# Patient Record
Sex: FEMALE | Race: WHITE | NOT HISPANIC OR LATINO | Employment: FULL TIME | URBAN - METROPOLITAN AREA
[De-identification: names, ages, dates, MRNs, and addresses within clinical notes are randomized per-mention and may not be internally consistent; named-entity substitution may affect disease eponyms.]

---

## 2017-01-30 RX ORDER — MELOXICAM 15 MG/1
TABLET ORAL
Qty: 30 TABLET | Refills: 0 | Status: SHIPPED | OUTPATIENT
Start: 2017-01-30 | End: 2017-03-28 | Stop reason: SDUPTHER

## 2017-03-28 RX ORDER — MELOXICAM 15 MG/1
TABLET ORAL
Qty: 30 TABLET | Refills: 0 | Status: SHIPPED | OUTPATIENT
Start: 2017-03-28 | End: 2017-04-29 | Stop reason: SDUPTHER

## 2017-05-01 RX ORDER — VALACYCLOVIR HYDROCHLORIDE 500 MG/1
TABLET, FILM COATED ORAL
Qty: 30 TABLET | Refills: 0 | Status: SHIPPED | OUTPATIENT
Start: 2017-05-01 | End: 2020-11-30 | Stop reason: SDUPTHER

## 2017-05-01 RX ORDER — MELOXICAM 15 MG/1
TABLET ORAL
Qty: 30 TABLET | Refills: 0 | Status: SHIPPED | OUTPATIENT
Start: 2017-05-01 | End: 2017-11-22 | Stop reason: SDUPTHER

## 2017-06-12 ENCOUNTER — OFFICE VISIT (OUTPATIENT)
Dept: INTERNAL MEDICINE | Facility: CLINIC | Age: 62
End: 2017-06-12

## 2017-06-12 VITALS
HEIGHT: 63 IN | DIASTOLIC BLOOD PRESSURE: 76 MMHG | HEART RATE: 74 BPM | BODY MASS INDEX: 24.45 KG/M2 | OXYGEN SATURATION: 98 % | WEIGHT: 138 LBS | SYSTOLIC BLOOD PRESSURE: 110 MMHG

## 2017-06-12 DIAGNOSIS — F41.9 ANXIETY: Primary | ICD-10-CM

## 2017-06-12 PROCEDURE — 99213 OFFICE O/P EST LOW 20 MIN: CPT | Performed by: INTERNAL MEDICINE

## 2017-06-12 RX ORDER — LORAZEPAM 0.5 MG/1
0.5 TABLET ORAL EVERY 8 HOURS PRN
Qty: 20 TABLET | Refills: 0 | Status: SHIPPED | OUTPATIENT
Start: 2017-06-12 | End: 2017-09-26 | Stop reason: SDUPTHER

## 2017-06-12 NOTE — PROGRESS NOTES
"Subjective     Becca Alba is a 61 y.o. female who presents with   Chief Complaint   Patient presents with   • Anxiety       History of Present Illness     She feels very anxious for the last one week.  Daughter with type 1 DM and several other health issues just had CVA.  Getting ready to go on a trip to [place].  Feels she needs something to get through trip for prn use only.        Review of Systems   Respiratory: Negative.    Cardiovascular: Negative.        The following portions of the patient's history were reviewed and updated as appropriate: allergies, current medications and problem list.    Patient Active Problem List    Diagnosis Date Noted   • Hyperlipidemia 04/25/2016   • Leukopenia 04/25/2016     Note Last Updated: 4/25/2016     Description: chronic s/p CBC evaluation.     • Osteoporosis 04/25/2016     Note Last Updated: 11/14/2016     Fosamax caused stomach upset.     • Uncomplicated asthma 04/25/2016       Current Outpatient Prescriptions on File Prior to Visit   Medication Sig Dispense Refill   • albuterol (PROVENTIL HFA) 108 (90 BASE) MCG/ACT inhaler Inhale 2 puffs.     • alendronate (FOSAMAX) 70 MG tablet Take  by mouth.     • Coenzyme Q10 (COQ10) 200 MG capsule Take  by mouth.     • lidocaine (XYLOCAINE) 2 % solution      • meloxicam (MOBIC) 15 MG tablet TAKE ONE TABLET BY MOUTH DAILY 30 tablet 0   • Multiple Vitamin (MULTI VITAMIN DAILY PO) Take  by mouth.     • valACYclovir (VALTREX) 500 MG tablet TAKE ONE TABLET BY MOUTH TWICE A DAY 30 tablet 0   • beclomethasone (QVAR) 40 MCG/ACT inhaler Inhale 2 puffs 2 (two) times a day. 1 inhaler 11     No current facility-administered medications on file prior to visit.        Objective     /76  Pulse 74  Ht 63\" (160 cm)  Wt 138 lb (62.6 kg)  SpO2 98%  BMI 24.45 kg/m2    Physical Exam   Constitutional: She is oriented to person, place, and time. She appears well-developed and well-nourished.   HENT:   Head: Normocephalic and atraumatic. "   Pulmonary/Chest: Effort normal.   Neurological: She is alert and oriented to person, place, and time.   Psychiatric: She has a normal mood and affect. Her behavior is normal.       Assessment/Plan   Becca was seen today for anxiety.    Diagnoses and all orders for this visit:    Anxiety    Other orders  -     LORazepam (ATIVAN) 0.5 MG tablet; Take 1 tablet by mouth Every 8 (Eight) Hours As Needed for Anxiety.        Discussion  Patient presents with situational anxiety.  Trial of prn lorazepam.  I think this will be very temporary.  She can use on plane as well.  As part of this patient's treatment plan I am prescribing controlled substances. The patient has been made aware of appropriate use of such medications, including potential risk of somnolence, limited ability to drive and/or work safely, and potential for dependence or overdose. It has also been made clear that these medications are for use by this patient only, without concomitant use of alcohol or other substances unless prescribed. The has completed prescribing agreement detailing terms of continued prescribing of controlled substances, including monitoring TREVON reports, urine drug screening, and pill counts if necessary. The patient is aware that inappropriate use will result in cessation of prescribing such medications. 15 minutes spent with the patient with 15 minutes spent counseling the following topics:, impressions           Future Appointments  Date Time Provider Department Center   11/14/2017 8:00 AM [facility] None   11/20/2017 3:30 PM [facility] None

## 2017-09-26 ENCOUNTER — OFFICE VISIT (OUTPATIENT)
Dept: INTERNAL MEDICINE | Facility: CLINIC | Age: 62
End: 2017-09-26

## 2017-09-26 VITALS
TEMPERATURE: 98.3 F | WEIGHT: 142 LBS | DIASTOLIC BLOOD PRESSURE: 72 MMHG | OXYGEN SATURATION: 98 % | HEART RATE: 69 BPM | SYSTOLIC BLOOD PRESSURE: 110 MMHG | HEIGHT: 63 IN | BODY MASS INDEX: 25.16 KG/M2

## 2017-09-26 DIAGNOSIS — M25.512 ACUTE PAIN OF LEFT SHOULDER: Primary | ICD-10-CM

## 2017-09-26 PROCEDURE — 73030 X-RAY EXAM OF SHOULDER: CPT | Performed by: INTERNAL MEDICINE

## 2017-09-26 PROCEDURE — 99213 OFFICE O/P EST LOW 20 MIN: CPT | Performed by: INTERNAL MEDICINE

## 2017-09-26 RX ORDER — METHYLPREDNISOLONE 4 MG/1
TABLET ORAL
Qty: 21 TABLET | Refills: 0 | Status: SHIPPED | OUTPATIENT
Start: 2017-09-26 | End: 2017-11-20

## 2017-09-26 RX ORDER — LORAZEPAM 0.5 MG/1
0.5 TABLET ORAL EVERY 8 HOURS PRN
Qty: 20 TABLET | Refills: 0 | Status: SHIPPED | OUTPATIENT
Start: 2017-09-26 | End: 2017-11-20 | Stop reason: SDUPTHER

## 2017-09-26 NOTE — PROGRESS NOTES
"Subjective     Becca Alba is a 61 y.o. female who presents with   Chief Complaint   Patient presents with   • Shoulder Pain     very sore  x 1 month       History of Present Illness     Left shoulder pain for one month.  No specific injury.  Severe at times.  No injury.  Meloxicam no help.      Review of Systems   Neurological: Negative for weakness.       The following portions of the patient's history were reviewed and updated as appropriate: allergies, current medications and problem list.    Patient Active Problem List    Diagnosis Date Noted   • Hyperlipidemia 04/25/2016   • Leukopenia 04/25/2016     Note Last Updated: 4/25/2016     Description: chronic s/p CBC evaluation.     • Osteoporosis 04/25/2016     Note Last Updated: 11/14/2016     Fosamax caused stomach upset.     • Uncomplicated asthma 04/25/2016       Current Outpatient Prescriptions on File Prior to Visit   Medication Sig Dispense Refill   • LORazepam (ATIVAN) 0.5 MG tablet Take 1 tablet by mouth Every 8 (Eight) Hours As Needed for Anxiety. 20 tablet 0   • meloxicam (MOBIC) 15 MG tablet TAKE ONE TABLET BY MOUTH DAILY 30 tablet 0   • Multiple Vitamin (MULTI VITAMIN DAILY PO) Take  by mouth.     • albuterol (PROVENTIL HFA) 108 (90 BASE) MCG/ACT inhaler Inhale 2 puffs.     • alendronate (FOSAMAX) 70 MG tablet Take  by mouth.     • beclomethasone (QVAR) 40 MCG/ACT inhaler Inhale 2 puffs 2 (two) times a day. 1 inhaler 11   • Coenzyme Q10 (COQ10) 200 MG capsule Take  by mouth.     • lidocaine (XYLOCAINE) 2 % solution      • valACYclovir (VALTREX) 500 MG tablet TAKE ONE TABLET BY MOUTH TWICE A DAY 30 tablet 0     No current facility-administered medications on file prior to visit.        Objective     /72 (BP Location: Left arm, Patient Position: Sitting, Cuff Size: Adult)  Pulse 69  Temp 98.3 °F (36.8 °C) (Oral)   Ht 63\" (160 cm)  Wt 142 lb (64.4 kg)  SpO2 98%  BMI 25.15 kg/m2    Physical Exam   Constitutional: She is oriented to " person, place, and time. She appears well-developed and well-nourished.   HENT:   Head: Normocephalic and atraumatic.   Pulmonary/Chest: Effort normal.   Musculoskeletal:        Left shoulder: She exhibits normal range of motion, no tenderness and no bony tenderness.   Neurological: She is alert and oriented to person, place, and time.   Psychiatric: She has a normal mood and affect. Her behavior is normal.     Procedures    X-rays of the left shoulder  performed today for following indication:   pain.  X-ray reveal nad.  There is no available x-ray for comparison.  X-ray sent to radiology for official interpretation and findings.        Assessment/Plan   Becca was seen today for shoulder pain.    Diagnoses and all orders for this visit:    Acute pain of left shoulder  -     XR Shoulder 2+ View Left  -     Ambulatory Referral to Physical Therapy Evaluate and treat    Other orders  -     MethylPREDNISolone (MEDROL, CLARK,) 4 MG tablet; Take as directed on package instructions.  -     LORazepam (ATIVAN) 0.5 MG tablet; Take 1 tablet by mouth Every 8 (Eight) Hours As Needed for Anxiety.        Discussion  Patient presents with left shoulder pain likely tendonitis of the rotator cuff.  Trial of conservative management with a steroid clark and physical therapy.  Hold meloxicam while on steroids.  Let me know if not feeling better over the next several weeks or if there is any change in symptoms.         Future Appointments  Date Time Provider Department Center   11/14/2017 8:00 AM [facility] None   11/20/2017 3:30 PM MD [facility] None

## 2017-11-14 ENCOUNTER — LAB (OUTPATIENT)
Dept: INTERNAL MEDICINE | Facility: CLINIC | Age: 62
End: 2017-11-14

## 2017-11-14 DIAGNOSIS — E07.9 DISEASE OF THYROID GLAND: ICD-10-CM

## 2017-11-14 DIAGNOSIS — D72.819 LEUKOPENIA, UNSPECIFIED TYPE: ICD-10-CM

## 2017-11-14 DIAGNOSIS — M81.0 OSTEOPOROSIS, UNSPECIFIED OSTEOPOROSIS TYPE, UNSPECIFIED PATHOLOGICAL FRACTURE PRESENCE: ICD-10-CM

## 2017-11-14 DIAGNOSIS — E78.5 HYPERLIPIDEMIA, UNSPECIFIED HYPERLIPIDEMIA TYPE: Primary | ICD-10-CM

## 2017-11-14 DIAGNOSIS — Z00.00 HEALTHCARE MAINTENANCE: ICD-10-CM

## 2017-11-14 DIAGNOSIS — L92.0 GRANULOMA ANNULARE: Primary | ICD-10-CM

## 2017-11-15 LAB
25(OH)D3+25(OH)D2 SERPL-MCNC: 32.3 NG/ML (ref 30–100)
ALBUMIN SERPL-MCNC: 4.4 G/DL (ref 3.5–5.2)
ALBUMIN/GLOB SERPL: 1.6 G/DL
ALP SERPL-CCNC: 52 U/L (ref 39–117)
ALT SERPL-CCNC: 17 U/L (ref 1–33)
APPEARANCE UR: CLEAR
AST SERPL-CCNC: 27 U/L (ref 1–32)
BACTERIA #/AREA URNS HPF: NORMAL /HPF
BASOPHILS # BLD AUTO: 0.03 10*3/MM3 (ref 0–0.2)
BASOPHILS NFR BLD AUTO: 1.2 % (ref 0–1.5)
BILIRUB SERPL-MCNC: 0.3 MG/DL (ref 0.1–1.2)
BILIRUB UR QL STRIP: NEGATIVE
BUN SERPL-MCNC: 16 MG/DL (ref 6–20)
BUN/CREAT SERPL: 19.8 (ref 7–25)
CALCIUM SERPL-MCNC: 8.9 MG/DL (ref 8.6–10.5)
CHLORIDE SERPL-SCNC: 103 MMOL/L (ref 98–107)
CHOLEST SERPL-MCNC: 190 MG/DL (ref 0–200)
CO2 SERPL-SCNC: 27.1 MMOL/L (ref 22–29)
COLOR UR: YELLOW
CREAT SERPL-MCNC: 0.81 MG/DL (ref 0.57–1)
EOSINOPHIL # BLD AUTO: 0.11 10*3/MM3 (ref 0–0.7)
EOSINOPHIL NFR BLD AUTO: 4.3 % (ref 0.3–6.2)
EPI CELLS #/AREA URNS HPF: NORMAL /HPF
ERYTHROCYTE [DISTWIDTH] IN BLOOD BY AUTOMATED COUNT: 14.5 % (ref 11.7–13)
GFR SERPLBLD CREATININE-BSD FMLA CKD-EPI: 72 ML/MIN/1.73
GFR SERPLBLD CREATININE-BSD FMLA CKD-EPI: 88 ML/MIN/1.73
GLOBULIN SER CALC-MCNC: 2.7 GM/DL
GLUCOSE SERPL-MCNC: 96 MG/DL (ref 65–99)
GLUCOSE UR QL: NEGATIVE
HCT VFR BLD AUTO: 40.5 % (ref 35.6–45.5)
HDLC SERPL-MCNC: 53 MG/DL (ref 40–60)
HGB BLD-MCNC: 13.5 G/DL (ref 11.9–15.5)
HGB UR QL STRIP: NEGATIVE
IMM GRANULOCYTES # BLD: 0 10*3/MM3 (ref 0–0.03)
IMM GRANULOCYTES NFR BLD: 0 % (ref 0–0.5)
KETONES UR QL STRIP: NEGATIVE
LDLC SERPL CALC-MCNC: 110 MG/DL (ref 0–100)
LEUKOCYTE ESTERASE UR QL STRIP: NEGATIVE
LYMPHOCYTES # BLD AUTO: 0.86 10*3/MM3 (ref 0.9–4.8)
LYMPHOCYTES NFR BLD AUTO: 33.6 % (ref 19.6–45.3)
MCH RBC QN AUTO: 28.9 PG (ref 26.9–32)
MCHC RBC AUTO-ENTMCNC: 33.3 G/DL (ref 32.4–36.3)
MCV RBC AUTO: 86.7 FL (ref 80.5–98.2)
MICRO URNS: NORMAL
MICRO URNS: NORMAL
MONOCYTES # BLD AUTO: 0.33 10*3/MM3 (ref 0.2–1.2)
MONOCYTES NFR BLD AUTO: 12.9 % (ref 5–12)
MUCOUS THREADS URNS QL MICRO: PRESENT /HPF
NEUTROPHILS # BLD AUTO: 1.23 10*3/MM3 (ref 1.9–8.1)
NEUTROPHILS NFR BLD AUTO: 48 % (ref 42.7–76)
NITRITE UR QL STRIP: NEGATIVE
PH UR STRIP: 5 [PH] (ref 5–7.5)
PLATELET # BLD AUTO: 144 10*3/MM3 (ref 140–500)
POTASSIUM SERPL-SCNC: 4.2 MMOL/L (ref 3.5–5.2)
PROT SERPL-MCNC: 7.1 G/DL (ref 6–8.5)
PROT UR QL STRIP: NEGATIVE
RBC # BLD AUTO: 4.67 10*6/MM3 (ref 3.9–5.2)
RBC #/AREA URNS HPF: NORMAL /HPF
SODIUM SERPL-SCNC: 143 MMOL/L (ref 136–145)
SP GR UR: 1.02 (ref 1–1.03)
T3RU NFR SERPL: 25 % (ref 24–39)
T4 FREE SERPL-MCNC: 1.29 NG/DL (ref 0.93–1.7)
THYROGLOB AB SERPL-ACNC: <1 IU/ML (ref 0–0.9)
THYROPEROXIDASE AB SERPL-ACNC: 15 IU/ML (ref 0–34)
TRIGL SERPL-MCNC: 137 MG/DL (ref 0–150)
TSH SERPL DL<=0.005 MIU/L-ACNC: 1.2 MIU/ML (ref 0.27–4.2)
URINALYSIS REFLEX: NORMAL
UROBILINOGEN UR STRIP-MCNC: 0.2 MG/DL (ref 0.2–1)
VLDLC SERPL CALC-MCNC: 27.4 MG/DL (ref 5–40)
WBC # BLD AUTO: 2.56 10*3/MM3 (ref 4.5–10.7)
WBC #/AREA URNS HPF: NORMAL /HPF

## 2017-11-20 ENCOUNTER — OFFICE VISIT (OUTPATIENT)
Dept: INTERNAL MEDICINE | Facility: CLINIC | Age: 62
End: 2017-11-20

## 2017-11-20 VITALS
HEART RATE: 64 BPM | OXYGEN SATURATION: 98 % | WEIGHT: 145 LBS | HEIGHT: 63 IN | SYSTOLIC BLOOD PRESSURE: 122 MMHG | RESPIRATION RATE: 18 BRPM | DIASTOLIC BLOOD PRESSURE: 68 MMHG | BODY MASS INDEX: 25.69 KG/M2

## 2017-11-20 DIAGNOSIS — M81.0 OSTEOPOROSIS, UNSPECIFIED OSTEOPOROSIS TYPE, UNSPECIFIED PATHOLOGICAL FRACTURE PRESENCE: ICD-10-CM

## 2017-11-20 DIAGNOSIS — Z00.00 WELL ADULT EXAM: Primary | ICD-10-CM

## 2017-11-20 DIAGNOSIS — Z12.31 ENCOUNTER FOR SCREENING MAMMOGRAM FOR BREAST CANCER: ICD-10-CM

## 2017-11-20 DIAGNOSIS — Z12.11 COLON CANCER SCREENING: ICD-10-CM

## 2017-11-20 DIAGNOSIS — Z11.59 NEED FOR HEPATITIS C SCREENING TEST: ICD-10-CM

## 2017-11-20 LAB
Lab: NORMAL
Lab: NORMAL

## 2017-11-20 PROCEDURE — 90471 IMMUNIZATION ADMIN: CPT | Performed by: INTERNAL MEDICINE

## 2017-11-20 PROCEDURE — 90732 PPSV23 VACC 2 YRS+ SUBQ/IM: CPT | Performed by: INTERNAL MEDICINE

## 2017-11-20 PROCEDURE — 90472 IMMUNIZATION ADMIN EACH ADD: CPT | Performed by: INTERNAL MEDICINE

## 2017-11-20 PROCEDURE — 99396 PREV VISIT EST AGE 40-64: CPT | Performed by: INTERNAL MEDICINE

## 2017-11-20 PROCEDURE — 90656 IIV3 VACC NO PRSV 0.5 ML IM: CPT | Performed by: INTERNAL MEDICINE

## 2017-11-20 RX ORDER — LORAZEPAM 0.5 MG/1
0.5 TABLET ORAL EVERY 8 HOURS PRN
Qty: 20 TABLET | Refills: 0 | Status: SHIPPED | OUTPATIENT
Start: 2017-11-20 | End: 2018-02-12 | Stop reason: SDUPTHER

## 2017-11-20 NOTE — PROGRESS NOTES
Subjective     Becca Alba is a 61 y.o. female who presents for a complete physical exam.      History of Present Illness     OP.  Discussed calcium and D.  She is due for Reclast next month.    HLD.  Mild increase but good ratios.    Asthma.  Spring only.  She is well-controlled.     Review of Systems   Constitutional: Negative.    HENT: Negative.    Eyes: Negative.    Respiratory: Negative.    Cardiovascular: Negative.    Gastrointestinal: Negative.    Endocrine: Negative.    Genitourinary: Negative.    Musculoskeletal:        Shoulder pain.   Skin: Negative.    Allergic/Immunologic: Negative.    Neurological: Negative.    Hematological: Negative.    Psychiatric/Behavioral: Negative.        The following portions of the patient's history were reviewed and updated as appropriate: allergies, current medications, past family history, past medical history, past social history, past surgical history and problem list.  Health maintenance tab was reviewed and updated with the patient.       Patient Active Problem List    Diagnosis Date Noted   • Hyperlipidemia 04/25/2016   • Leukopenia 04/25/2016     Note Last Updated: 4/25/2016     Description: chronic s/p CBC evaluation.     • Osteoporosis 04/25/2016     Note Last Updated: 11/20/2017     Fosamax caused stomach upset.  Now started in 12/2016     • Uncomplicated asthma 04/25/2016       Past Medical History:   Diagnosis Date   • Allergic rhinitis    • Atypical chest pain    • Chronic leukopenia    • Depression    • H/O bone density study 12/21/2015   • H/O mammogram 12/29/2015   • History of EKG 11/09/2015   • History of fever    • History of herpes genitalis    • Hyperlipidemia    • IBS (irritable bowel syndrome)    • Osteopenia    • Rib pain on left side    • Rib pain on left side    • Tinnitus        Past Surgical History:   Procedure Laterality Date   • BLADDER SURGERY     • COLONOSCOPY  07/20/2012   • NEUROMA SURGERY  12/18/2014       Family History   Problem  Relation Age of Onset   • Diabetes type II Mother    • Osteoarthritis Mother    • Macular degeneration Father    • Cancer Maternal Grandmother    • Heart disease Maternal Grandmother        Social History     Social History   • Marital status:      Spouse name: N/A   • Number of children: N/A   • Years of education: N/A     Occupational History   • Not on file.     Social History Main Topics   • Smoking status: Former Smoker   • Smokeless tobacco: Never Used   • Alcohol use Yes      Comment: Social   • Drug use: Not on file   • Sexual activity: Not on file     Other Topics Concern   • Not on file     Social History Narrative       Current Outpatient Prescriptions on File Prior to Visit   Medication Sig Dispense Refill   • albuterol (PROVENTIL HFA) 108 (90 BASE) MCG/ACT inhaler Inhale 2 puffs.     • alendronate (FOSAMAX) 70 MG tablet Take  by mouth.     • beclomethasone (QVAR) 40 MCG/ACT inhaler Inhale 2 puffs 2 (two) times a day. 1 inhaler 11   • Coenzyme Q10 (COQ10) 200 MG capsule Take  by mouth.     • meloxicam (MOBIC) 15 MG tablet TAKE ONE TABLET BY MOUTH DAILY 30 tablet 0   • Multiple Vitamin (MULTI VITAMIN DAILY PO) Take  by mouth.     • valACYclovir (VALTREX) 500 MG tablet TAKE ONE TABLET BY MOUTH TWICE A DAY 30 tablet 0   • [DISCONTINUED] lidocaine (XYLOCAINE) 2 % solution      • [DISCONTINUED] LORazepam (ATIVAN) 0.5 MG tablet Take 1 tablet by mouth Every 8 (Eight) Hours As Needed for Anxiety. 20 tablet 0   • [DISCONTINUED] MethylPREDNISolone (MEDROL, CLARK,) 4 MG tablet Take as directed on package instructions. 21 tablet 0     No current facility-administered medications on file prior to visit.        Allergies   Allergen Reactions   • Amoxicillin-Pot Clavulanate    • Codeine    • Hydrocodone        Immunization History   Administered Date(s) Administered   • Flu Vaccine Quad PF >18YRS 11/14/2016   • Influenza TIV (IM) 01/01/2014, 11/09/2015   • Tdap 07/22/2008       Objective     /68  Pulse 64   "Resp 18  Ht 63\" (160 cm)  Wt 145 lb (65.8 kg)  SpO2 98%  BMI 25.69 kg/m2    Physical Exam   Constitutional: She is oriented to person, place, and time. She appears well-developed and well-nourished.   HENT:   Head: Normocephalic and atraumatic.   Right Ear: Hearing, tympanic membrane and external ear normal.   Left Ear: Hearing, tympanic membrane and external ear normal.   Nose: Nose normal.   Mouth/Throat: Oropharynx is clear and moist.   Neck: Neck supple. No thyromegaly present.   Cardiovascular: Normal rate, regular rhythm and normal heart sounds.    No murmur heard.  Pulmonary/Chest: Effort normal and breath sounds normal. Right breast exhibits no mass. Left breast exhibits no mass.   Abdominal: Soft. She exhibits no distension. There is no hepatosplenomegaly. There is no tenderness.   Genitourinary: No breast tenderness.   Lymphadenopathy:     She has no cervical adenopathy.   Neurological: She is alert and oriented to person, place, and time.   Skin: Skin is warm and dry.   Psychiatric: She has a normal mood and affect. Her speech is normal and behavior is normal. Judgment and thought content normal. Cognition and memory are normal.       Assessment/Plan   Becca was seen today for annual exam.    Diagnoses and all orders for this visit:    Well adult exam    Encounter for screening mammogram for breast cancer  -     Mammo Screening Bilateral With CAD; Future    Colon cancer screening  -     Ambulatory Referral For Screening Colonoscopy    Need for hepatitis C screening test  -     Hepatitis C Antibody    Osteoporosis, unspecified osteoporosis type, unspecified pathological fracture presence  -     Ambulatory Referral to [facility] For Infusion Treatment    Other orders  -     LORazepam (ATIVAN) 0.5 MG tablet; Take 1 tablet by mouth Every 8 (Eight) Hours As Needed for Anxiety.  -     Pneumococcal Polysaccharide Vaccine 23-Valent Greater Than or Equal To 3yo Subcutaneous / IM  -     Flu Vaccine Quad PF " >18YR        Discussion    Patient presents today for a CPE.      Patient follows a healthy diet.   Patient follows an adequate exercise regimen. Mammogram is ordered.   Colon cancer screening is ordered.   Pap smear performed every 3 years.   Immunizations are as per orders.   Patient will schedule a DEXA.      Health Maintenance   Topic Date Due   • HEPATITIS C SCREENING  04/25/2016   • INFLUENZA VACCINE  08/01/2017   • MAMMOGRAM  12/01/2017   • DXA SCAN  12/21/2017   • TDAP/TD VACCINES (2 - Td) 07/22/2018   • PAP SMEAR  11/09/2018   • LIPID PANEL  11/14/2018   • COLONOSCOPY  07/20/2022            No future appointments.

## 2017-11-21 RX ORDER — SODIUM CHLORIDE 9 MG/ML
250 INJECTION, SOLUTION INTRAVENOUS ONCE
Status: CANCELLED | OUTPATIENT
Start: 2017-11-21

## 2017-11-22 ENCOUNTER — OFFICE VISIT (OUTPATIENT)
Dept: INTERNAL MEDICINE | Facility: CLINIC | Age: 62
End: 2017-11-22

## 2017-11-22 VITALS
SYSTOLIC BLOOD PRESSURE: 130 MMHG | BODY MASS INDEX: 25.52 KG/M2 | DIASTOLIC BLOOD PRESSURE: 78 MMHG | HEIGHT: 63 IN | OXYGEN SATURATION: 98 % | WEIGHT: 144 LBS | HEART RATE: 76 BPM

## 2017-11-22 DIAGNOSIS — M25.421 SWELLING OF JOINT OF UPPER ARM, RIGHT: ICD-10-CM

## 2017-11-22 DIAGNOSIS — T50.A95A: Primary | ICD-10-CM

## 2017-11-22 LAB
HCV AB S/CO SERPL IA: 0.2 S/CO RATIO (ref 0–0.9)
WRITTEN AUTHORIZATION: NORMAL

## 2017-11-22 PROCEDURE — 99213 OFFICE O/P EST LOW 20 MIN: CPT | Performed by: INTERNAL MEDICINE

## 2017-11-22 RX ORDER — CEPHALEXIN 500 MG/1
500 CAPSULE ORAL 2 TIMES DAILY
Qty: 21 CAPSULE | Refills: 0 | Status: SHIPPED | OUTPATIENT
Start: 2017-11-22 | End: 2017-12-29

## 2017-11-22 RX ORDER — METHYLPREDNISOLONE 4 MG/1
TABLET ORAL
Qty: 21 TABLET | Refills: 0 | Status: SHIPPED | OUTPATIENT
Start: 2017-11-22 | End: 2017-12-29

## 2017-11-22 RX ORDER — MELOXICAM 15 MG/1
TABLET ORAL
Qty: 30 TABLET | Refills: 0 | Status: SHIPPED | OUTPATIENT
Start: 2017-11-22 | End: 2018-01-08 | Stop reason: SDUPTHER

## 2017-11-22 NOTE — PROGRESS NOTES
"Subjective     Becca Alba is a 61 y.o. female who presents with   Chief Complaint   Patient presents with   • Generalized Body Aches   • Medication Reaction     pneumovax       History of Present Illness     She had pneumovax and flu on Monday.  Noticed a reaction on Tuesday.  No fever.  HA.  Achy all over.      Review of Systems   Constitutional: Positive for fever.   Respiratory: Negative for shortness of breath.    Cardiovascular: Negative for chest pain.       The following portions of the patient's history were reviewed and updated as appropriate: allergies, current medications and problem list.    Patient Active Problem List    Diagnosis Date Noted   • Hyperlipidemia 04/25/2016   • Leukopenia 04/25/2016     Note Last Updated: 4/25/2016     Description: chronic s/p CBC evaluation.     • Osteoporosis 04/25/2016     Note Last Updated: 11/20/2017     Fosamax caused stomach upset.  Now started in 12/2016     • Uncomplicated asthma 04/25/2016       Current Outpatient Prescriptions on File Prior to Visit   Medication Sig Dispense Refill   • albuterol (PROVENTIL HFA) 108 (90 BASE) MCG/ACT inhaler Inhale 2 puffs.     • alendronate (FOSAMAX) 70 MG tablet Take  by mouth.     • beclomethasone (QVAR) 40 MCG/ACT inhaler Inhale 2 puffs 2 (two) times a day. 1 inhaler 11   • Coenzyme Q10 (COQ10) 200 MG capsule Take  by mouth.     • LORazepam (ATIVAN) 0.5 MG tablet Take 1 tablet by mouth Every 8 (Eight) Hours As Needed for Anxiety. 20 tablet 0   • mometasone (ELOCON) 0.1 % cream Apply  topically 2 (Two) Times a Day. 15 g 0   • Multiple Vitamin (MULTI VITAMIN DAILY PO) Take  by mouth.     • valACYclovir (VALTREX) 500 MG tablet TAKE ONE TABLET BY MOUTH TWICE A DAY 30 tablet 0   • [DISCONTINUED] meloxicam (MOBIC) 15 MG tablet TAKE ONE TABLET BY MOUTH DAILY 30 tablet 0     No current facility-administered medications on file prior to visit.        Objective     /78  Pulse 76  Ht 63\" (160 cm)  Wt 144 lb (65.3 kg)  " SpO2 98%  BMI 25.51 kg/m2    Physical Exam   Constitutional: She is oriented to person, place, and time. She appears well-developed and well-nourished.   HENT:   Head: Normocephalic and atraumatic.   Pulmonary/Chest: Effort normal.   Neurological: She is alert and oriented to person, place, and time.   Skin:   Patches of redness and induration in the RUE.   Psychiatric: She has a normal mood and affect. Her behavior is normal.       Assessment/Plan   Becca was seen today for generalized body aches and medication reaction.    Diagnoses and all orders for this visit:    Reaction to Pneumovax immunization, initial encounter    Swelling of joint of upper arm, right    Other orders  -     MethylPREDNISolone (MEDROL, CLARK,) 4 MG tablet; Take as directed on package instructions.  -     cephalexin (KEFLEX) 500 MG capsule; Take 1 capsule by mouth 2 (Two) Times a Day.        Discussion    Patient presents with a reaction to pneumovax.  Rx for a steroid clark and keflex to cover possibility of infection as well.  Let me know if not feeling better over the next 3 days or if there is any change in symptoms.           Future Appointments  Date Time Provider Department Center   12/22/2017 10:00 AM [facility] None   11/5/2018 8:10 AM [facility] None   11/21/2018 10:00 AM [facility] None

## 2017-12-20 DIAGNOSIS — M81.0 OSTEOPOROSIS, UNSPECIFIED OSTEOPOROSIS TYPE, UNSPECIFIED PATHOLOGICAL FRACTURE PRESENCE: Primary | ICD-10-CM

## 2017-12-20 DIAGNOSIS — M25.512 LEFT SHOULDER PAIN, UNSPECIFIED CHRONICITY: Primary | ICD-10-CM

## 2017-12-20 LAB
ALBUMIN SERPL-MCNC: 4.3 G/DL (ref 3.5–5.2)
ALBUMIN/GLOB SERPL: 1.5 G/DL
ALP SERPL-CCNC: 58 U/L (ref 39–117)
ALT SERPL-CCNC: 13 U/L (ref 1–33)
AST SERPL-CCNC: 22 U/L (ref 1–32)
BILIRUB SERPL-MCNC: 0.5 MG/DL (ref 0.1–1.2)
BUN SERPL-MCNC: 22 MG/DL (ref 8–23)
BUN/CREAT SERPL: 26.8 (ref 7–25)
CALCIUM SERPL-MCNC: 9.1 MG/DL (ref 8.6–10.5)
CHLORIDE SERPL-SCNC: 102 MMOL/L (ref 98–107)
CO2 SERPL-SCNC: 31.6 MMOL/L (ref 22–29)
CREAT SERPL-MCNC: 0.82 MG/DL (ref 0.57–1)
GLOBULIN SER CALC-MCNC: 2.9 GM/DL
GLUCOSE SERPL-MCNC: 83 MG/DL (ref 65–99)
POTASSIUM SERPL-SCNC: 4.3 MMOL/L (ref 3.5–5.2)
PROT SERPL-MCNC: 7.2 G/DL (ref 6–8.5)
SODIUM SERPL-SCNC: 143 MMOL/L (ref 136–145)

## 2017-12-22 ENCOUNTER — APPOINTMENT (OUTPATIENT)
Dept: WOMENS IMAGING | Facility: HOSPITAL | Age: 62
End: 2017-12-22

## 2017-12-22 ENCOUNTER — CLINICAL SUPPORT (OUTPATIENT)
Dept: INTERNAL MEDICINE | Facility: CLINIC | Age: 62
End: 2017-12-22

## 2017-12-22 DIAGNOSIS — Z78.0 POSTMENOPAUSAL: Primary | ICD-10-CM

## 2017-12-22 PROCEDURE — 77080 DXA BONE DENSITY AXIAL: CPT | Performed by: INTERNAL MEDICINE

## 2017-12-22 PROCEDURE — 77063 BREAST TOMOSYNTHESIS BI: CPT | Performed by: RADIOLOGY

## 2017-12-22 PROCEDURE — G0202 SCR MAMMO BI INCL CAD: HCPCS | Performed by: RADIOLOGY

## 2017-12-22 PROCEDURE — 77067 SCR MAMMO BI INCL CAD: CPT | Performed by: RADIOLOGY

## 2017-12-29 ENCOUNTER — HOSPITAL ENCOUNTER (OUTPATIENT)
Dept: INFUSION THERAPY | Facility: HOSPITAL | Age: 62
Discharge: HOME | End: 2017-12-29
Admitting: INTERNAL MEDICINE

## 2017-12-29 VITALS
HEART RATE: 84 BPM | DIASTOLIC BLOOD PRESSURE: 63 MMHG | RESPIRATION RATE: 16 BRPM | WEIGHT: 145 LBS | OXYGEN SATURATION: 95 % | TEMPERATURE: 97.9 F | SYSTOLIC BLOOD PRESSURE: 111 MMHG | BODY MASS INDEX: 25.69 KG/M2

## 2017-12-29 DIAGNOSIS — M81.0 OSTEOPOROSIS, UNSPECIFIED OSTEOPOROSIS TYPE, UNSPECIFIED PATHOLOGICAL FRACTURE PRESENCE: ICD-10-CM

## 2017-12-29 PROCEDURE — 96365 THER/PROPH/DIAG IV INF INIT: CPT

## 2017-12-29 PROCEDURE — 25010000002 ZOLEDRONIC ACID 5 MG/100ML SOLUTION: Performed by: INTERNAL MEDICINE

## 2017-12-29 RX ORDER — ZOLEDRONIC ACID 5 MG/100ML
5 INJECTION, SOLUTION INTRAVENOUS ONCE
Status: COMPLETED | OUTPATIENT
Start: 2017-12-29 | End: 2017-12-29

## 2017-12-29 RX ORDER — SODIUM CHLORIDE 9 MG/ML
250 INJECTION, SOLUTION INTRAVENOUS ONCE
Status: CANCELLED | OUTPATIENT
Start: 2017-12-29

## 2017-12-29 RX ORDER — ZOLEDRONIC ACID 5 MG/100ML
5 INJECTION, SOLUTION INTRAVENOUS ONCE
Status: CANCELLED | OUTPATIENT
Start: 2017-12-29

## 2017-12-29 RX ADMIN — ZOLEDRONIC ACID 5 MG: 0.05 INJECTION, SOLUTION INTRAVENOUS at 09:20

## 2018-01-03 ENCOUNTER — HOSPITAL ENCOUNTER (OUTPATIENT)
Dept: PHYSICAL THERAPY | Facility: HOSPITAL | Age: 63
Setting detail: THERAPIES SERIES
Discharge: HOME | End: 2018-01-03

## 2018-01-03 DIAGNOSIS — M75.02 ADHESIVE CAPSULITIS OF LEFT SHOULDER: Primary | ICD-10-CM

## 2018-01-03 DIAGNOSIS — M25.512 LEFT SHOULDER PAIN, UNSPECIFIED CHRONICITY: ICD-10-CM

## 2018-01-03 DIAGNOSIS — M25.612 SHOULDER STIFFNESS, LEFT: ICD-10-CM

## 2018-01-03 DIAGNOSIS — R29.898 SHOULDER WEAKNESS: ICD-10-CM

## 2018-01-03 PROCEDURE — 97110 THERAPEUTIC EXERCISES: CPT | Performed by: PHYSICAL THERAPIST

## 2018-01-03 PROCEDURE — 97161 PT EVAL LOW COMPLEX 20 MIN: CPT | Performed by: PHYSICAL THERAPIST

## 2018-01-03 NOTE — THERAPY EVALUATION
Outpatient Physical Therapy Ortho Initial Evaluation  [facility]     Patient Name: Becca Alba  : 1955  MRN: [id number]  Today's Date: 1/3/2018      Visit Date: 2018    Patient Active Problem List   Diagnosis   • Hyperlipidemia   • Leukopenia   • Osteoporosis   • Uncomplicated asthma        Past Medical History:   Diagnosis Date   • Allergic rhinitis    • Atypical chest pain    • Chronic leukopenia    • Depression    • H/O bone density study 2015   • H/O mammogram 2015   • History of EKG 2015   • History of fever    • History of herpes genitalis    • Hyperlipidemia    • IBS (irritable bowel syndrome)    • Osteopenia    • Rib pain on left side    • Rib pain on left side    • Tinnitus         Past Surgical History:   Procedure Laterality Date   • BLADDER SURGERY     • COLONOSCOPY  2012   • NEUROMA SURGERY  2014       Visit Dx:     ICD-10-CM ICD-9-CM   1. Adhesive capsulitis of left shoulder M75.02 726.0   2. Left shoulder pain, unspecified chronicity M25.512 719.41   3. Shoulder stiffness, left M25.612 719.51   4. Shoulder weakness R29.898 719.61             Patient History       18 1600          History    Chief Complaint Joint stiffness;Pain  -PB      Type of Pain Shoulder pain  -PB      Date Current Problem(s) Began --   2017 insideous   -PB      Brief Description of Current Complaint Patient reports she has a left frozen shoulder than was treated for 5 weeks by PT initially not knowing diagnosis and injection prior to Thanksgiving without change. The shoulder throbs when she is more physically active. It hurts with more limited motion reaching behind her back. She gets intermittent intense pain with sudden movements (flicking a towel)  She is taking daily Mobic and doing shoulder blade pinches only.   -PB      Previous treatment for THIS PROBLEM Injections;Rehabilitation;Medication  -PB      Patient/Caregiver Goals Relieve pain;Improve  mobility  -PB      Hand Dominance right-handed  -PB      Occupation/sports/leisure activities High school    -PB      What clinical tests have you had for this problem? X-ray  -PB      Pain     Pain Location Shoulder  -PB      Pain at Present 3  -PB      Pain at Best 0  -PB      Pain at Worst 6  -PB      Is your sleep disturbed? No  -PB      Fall Risk Assessment    Any falls in the past year: No  -PB      Services    Are you currently receiving Home Health services No  -PB      Daily Activities    Primary Language English  -PB      Are you able to read Yes  -PB      Are you able to write Yes  -PB      How does patient learn best? Listening  -PB      Teaching needs identified Home Exercise Program;Management of Condition  -PB      Barriers to learning None  -PB      Pt Participated in POC and Goals Yes  -PB      Safety    Are you being hurt, hit, or frightened by anyone at home or in your life? No  -PB      Are you being neglected by a caregiver No  -PB        User Key  (r) = Recorded By, (t) = Taken By, (c) = Cosigned By    Initials Name Provider Type    PB Gwen Garcia, PT Physical Therapist                PT Ortho       01/03/18 1800    Subjective Comments    Subjective Comments Initial evaluation   -PB    Precautions and Contraindications    Precautions/Limitations no known precautions/limitations  -PB    Subjective Pain    Able to rate subjective pain? yes  -PB    Pre-Treatment Pain Level 3  -PB    Post-Treatment Pain Level 3  -PB    Posture/Observations    Posture- WNL Posture is WNL  -PB    Shoulder Girdle Accessory Motions    Posterior glide of humerus Left:;Hypomobile  -PB    Anterior glide of humerus Left:;Hypomobile  -PB    Lateral distraction Left:;Hypomobile  -PB    Long axis distraction Left:;Hypomobile  -PB    Left Shoulder    Flexion AROM Deficit 134  -PB    Flexion PROM Deficit 140 with pain  -PB    ABduction AROM Deficit 143  -PB    ABduction PROM Deficit 115 with pain  -PB     External Rotation AROM Deficit 43  -PB    External Rotation PROM Deficit 40 with pain   -PB    Internal Rotation AROM Deficit L iliac crest   -PB    Internal Rotation PROM Deficit 41 with pain   -PB    Right Shoulder    Flexion ROM Details 156  -PB    ABduction ROM Details 175  -PB    External Rotation ROM Details 86  -PB    Internal Rotation ROM Details T9  -PB    Left Shoulder    Flexion Gross Movement (4-/5) good minus  -PB    ABduction Gross Movement (3+/5) fair plus  -PB    Int Rotation Gross Movement (4+/5) good plus  -PB    Ext Rotation Gross Movement (4+/5) good plus  -PB    Right Shoulder    Flexion Gross Movement (4+/5) good plus  -PB    ABduction Gross Movement (4+/5) good plus  -PB    Int Rotation Gross Movement (5/5) normal  -PB    Ext Rotation Gross Movement (5/5) normal  -PB      User Key  (r) = Recorded By, (t) = Taken By, (c) = Cosigned By    Initials Name Provider Type    PB Gwen Garcia, PT Physical Therapist                      Therapy Education  Given: HEP  Program: New  How Provided: Verbal, Demonstration, Written  Provided to: Patient  Level of Understanding: Verbalized, Demonstrated, Teach back education performed           PT OP Goals       01/03/18 1800       PT Short Term Goals    STG Date to Achieve 02/14/18  -PB     STG 1 Patient will demonstrate an independent HEP progressed for L shoulder flexibility and be compliant with regular performance   -PB     STG 1 Progress New  -PB     STG 2 Patient will demonstrate increased L shoulder PROM to at least 155 degrees flexion, 140 degrees abduction, 65 degrees ER and 65 degrees IR   -PB     STG 2 Progress New  -PB     STG 3 Patient will demonstrate increased L shoulder AROM to at least 150 degrees flexion, 150 degrees abduction, 60 degrees ER and IR to reach central T/L junction   -PB     STG 3 Progress New  -PB     STG 4 Patient will increase L shoulder strength to 4/5   -PB     STG 4 Progress New  -PB     Long Term Goals    LTG Date to  Achieve 04/01/18  -PB     LTG 1 Patient will demonstrates increased L shoulder PROM and AROM to 90% of R side   -PB     LTG 1 Progress New  -PB     LTG 2 Patient will increase L shoulder strength to 4+/5   -PB     LTG 2 Progress New  -PB     LTG 3 Patient will report no disability with use of L shoulder with ADLs   -PB     LTG 3 Progress New  -PB     Time Calculation    PT Goal Re-Cert Due Date 04/01/18  -PB       User Key  (r) = Recorded By, (t) = Taken By, (c) = Cosigned By    Initials Name Provider Type    PB Gwen Garcia, PT Physical Therapist                PT Assessment/Plan       01/03/18 7554       PT Assessment    Functional Limitations Limitations in functional capacity and performance;Performance in self-care ADL  -PB     Impairments Pain;Muscle strength;Range of motion;Joint mobility  -PB     Assessment Comments Ms. Alba is a 62 year-old right hand dominant female with onset of left shoulder pain and stiffness that started in October 2017.  Her condition is currently stable however she does feel that she is becoming more stiff in the past month.  She has no comorbidities or personal factors that may affect the plan of care.  She has signs and symptoms of left shoulder primary adhesive capsulitis with less likely undermying pathology. She does have a moderate to high degree of pain irritability and a low tolerance to push into painful stretching that will require a slow and longer rehab approach.   -PB     Please refer to paper survey for additional self-reported information Yes  -PB     Rehab Potential Good  -PB     Patient/caregiver participated in establishment of treatment plan and goals Yes  -PB     Patient would benefit from skilled therapy intervention Yes  -PB     PT Plan    PT Frequency 2x/week  -PB     Predicted Duration of Therapy Intervention (days/wks) 90 days   -PB     Planned CPT's? PT EVAL LOW COMPLEXITY: 53239;PT ULTRASOUND EA 15 MIN: 45785;PT MANUAL THERAPY EA 15 MIN: 13585;PT  THER PROC EA 15 MIN: 10905;PT HOT OR COLD PACK TREAT MCARE;PT ELECTRICAL STIM UNATTEND: ;PT NEUROMUSC RE-EDUCATION EA 15 MIN: 41398  -PB       User Key  (r) = Recorded By, (t) = Taken By, (c) = Cosigned By    Initials Name Provider Type    PB Gwen Garcia PT Physical Therapist                  Exercises       01/03/18 1800          Subjective Comments    Subjective Comments Initial evaluation   -PB      Subjective Pain    Able to rate subjective pain? yes  -PB      Pre-Treatment Pain Level 3  -PB      Post-Treatment Pain Level 3  -PB      Exercise 1    Exercise Name 1 Patient instructed in HEP for pendulums, wall slides flex and abd, ER at wall, retractions with avoiding shoulder elevation   -PB        User Key  (r) = Recorded By, (t) = Taken By, (c) = Cosigned By    Initials Name Provider Type    PB Gwen Garcia, PT Physical Therapist                        Outcome Measure Options:  (SPADI score is a 13% disability )         Time Calculation:   Start Time: 1601  Stop Time: 1645  Time Calculation (min): 44 min     Therapy Charges for Today     Code Description Service Date Service Provider Modifiers Qty    05708238272 HC PT THER PROC EA 15 MIN 1/3/2018 Gwen Garcia, PT GP 1    76610349570 HC PT EVAL LOW COMPLEXITY 2 1/3/2018 Gwen Garcia, PT GP 1          PT G-Codes  Outcome Measure Options:  (SPADI score is a 13% disability )         Gwen Garcia, SARAH  1/3/2018

## 2018-01-05 ENCOUNTER — HOSPITAL ENCOUNTER (OUTPATIENT)
Dept: PHYSICAL THERAPY | Facility: HOSPITAL | Age: 63
Setting detail: THERAPIES SERIES
Discharge: HOME | End: 2018-01-05

## 2018-01-05 DIAGNOSIS — M25.612 SHOULDER STIFFNESS, LEFT: ICD-10-CM

## 2018-01-05 DIAGNOSIS — M25.512 LEFT SHOULDER PAIN, UNSPECIFIED CHRONICITY: ICD-10-CM

## 2018-01-05 DIAGNOSIS — M75.02 ADHESIVE CAPSULITIS OF LEFT SHOULDER: Primary | ICD-10-CM

## 2018-01-05 DIAGNOSIS — R29.898 SHOULDER WEAKNESS: ICD-10-CM

## 2018-01-05 PROCEDURE — 97140 MANUAL THERAPY 1/> REGIONS: CPT | Performed by: PHYSICAL THERAPIST

## 2018-01-05 NOTE — THERAPY TREATMENT NOTE
Outpatient Physical Therapy Ortho Treatment Note  [facility]     Patient Name: Becca Alba  : 1955  MRN: [id number]  Today's Date: 2018      Visit Date: 2018    Visit Dx:    ICD-10-CM ICD-9-CM   1. Adhesive capsulitis of left shoulder M75.02 726.0   2. Left shoulder pain, unspecified chronicity M25.512 719.41   3. Shoulder stiffness, left M25.612 719.51   4. Shoulder weakness R29.898 719.61       Patient Active Problem List   Diagnosis   • Hyperlipidemia   • Leukopenia   • Osteoporosis   • Uncomplicated asthma        Past Medical History:   Diagnosis Date   • Allergic rhinitis    • Atypical chest pain    • Chronic leukopenia    • Depression    • H/O bone density study 2015   • H/O mammogram 2015   • History of EKG 2015   • History of fever    • History of herpes genitalis    • Hyperlipidemia    • IBS (irritable bowel syndrome)    • Osteopenia    • Rib pain on left side    • Rib pain on left side    • Tinnitus         Past Surgical History:   Procedure Laterality Date   • BLADDER SURGERY     • COLONOSCOPY  2012   • NEUROMA SURGERY  2014             PT Ortho       18 1800    Subjective Comments    Subjective Comments Initial evaluation   -PB    Precautions and Contraindications    Precautions/Limitations no known precautions/limitations  -PB    Subjective Pain    Able to rate subjective pain? yes  -PB    Pre-Treatment Pain Level 3  -PB    Post-Treatment Pain Level 3  -PB    Posture/Observations    Posture- WNL Posture is WNL  -PB    Shoulder Girdle Accessory Motions    Posterior glide of humerus Left:;Hypomobile  -PB    Anterior glide of humerus Left:;Hypomobile  -PB    Lateral distraction Left:;Hypomobile  -PB    Long axis distraction Left:;Hypomobile  -PB    Left Shoulder    Flexion AROM Deficit 134  -PB    Flexion PROM Deficit 140 with pain  -PB    ABduction AROM Deficit 143  -PB    ABduction PROM Deficit 115 with pain  -PB    External Rotation  AROM Deficit 43  -PB    External Rotation PROM Deficit 40 with pain   -PB    Internal Rotation AROM Deficit L iliac crest   -PB    Internal Rotation PROM Deficit 41 with pain   -PB    Right Shoulder    Flexion ROM Details 156  -PB    ABduction ROM Details 175  -PB    External Rotation ROM Details 86  -PB    Internal Rotation ROM Details T9  -PB    Left Shoulder    Flexion Gross Movement (4-/5) good minus  -PB    ABduction Gross Movement (3+/5) fair plus  -PB    Int Rotation Gross Movement (4+/5) good plus  -PB    Ext Rotation Gross Movement (4+/5) good plus  -PB    Right Shoulder    Flexion Gross Movement (4+/5) good plus  -PB    ABduction Gross Movement (4+/5) good plus  -PB    Int Rotation Gross Movement (5/5) normal  -PB    Ext Rotation Gross Movement (5/5) normal  -PB      User Key  (r) = Recorded By, (t) = Taken By, (c) = Cosigned By    Initials Name Provider Type    HILARIO Garcia, PT Physical Therapist                            PT Assessment/Plan       01/05/18 1718       PT Assessment    Assessment Comments Left shoulder pain prior to end feel joint restrictions but patient able to tolerate mild to moderate stretching without having to stop and visually shows increased ROM compared to last measurements.  Patient needs some encouragement to work through pain of frozen shoulder.   -PB       User Key  (r) = Recorded By, (t) = Taken By, (c) = Cosigned By    Initials Name Provider Type    HILARIO Garcia PT Physical Therapist                Modalities       01/05/18 1700          Ice    Ice Applied Yes  -PB      Location L shoulder   -PB      Rx Minutes 10 mins  -PB      Ice S/P Rx Yes  -PB        User Key  (r) = Recorded By, (t) = Taken By, (c) = Cosigned By    Initials Name Provider Type    HILARIO Garcia PT Physical Therapist                Exercises       01/05/18 1700          Subjective Comments    Subjective Comments Sore doing exercises   -PB      Subjective Pain    Able to rate subjective  pain? yes  -PB      Pre-Treatment Pain Level 3  -PB      Post-Treatment Pain Level 3  -PB      Exercise 1    Exercise Name 1 Patient to continue working on same HEP   -PB        User Key  (r) = Recorded By, (t) = Taken By, (c) = Cosigned By    Initials Name Provider Type    HILARIO Garcia PT Physical Therapist                        Manual Rx (last 36 hours)      Manual Treatments       01/05/18 1700          Manual Rx 1    Manual Rx 1 Location Left glenohumeral joint  -PB      Manual Rx 1 Type Post, lateral, ant and posterior glides   -PB      Manual Rx 1 Grade Grade III-IV  -PB      Manual Rx 2    Manual Rx 2 Location Left deltoid   -PB      Manual Rx 2 Type STM through clothing   -PB      Manual Rx 3    Manual Rx 3 Location Left shoulder   -PB      Manual Rx 3 Type Passive stretching between mobilizations to facilitate shoulder flexion, abduction, IR and ER   -PB      Manual Rx 3 Duration Total manual therapy time 40 min  -PB        User Key  (r) = Recorded By, (t) = Taken By, (c) = Cosigned By    Initials Name Provider Type    HILARIO Garcia PT Physical Therapist                             Time Calculation:   Start Time: 1520  Stop Time: 1700  Time Calculation (min): 100 min    Therapy Charges for Today     Code Description Service Date Service Provider Modifiers Qty    85129698977 HC PT MANUAL THERAPY EA 15 MIN 1/5/2018 Gwen Garcia, PT GP 3    77885627570 HC PT HOT OR COLD PACK TREAT MCARE 1/5/2018 Gwen Garcia, PT GP 1                    Gwen Garcia, PT  1/5/2018

## 2018-01-08 ENCOUNTER — HOSPITAL ENCOUNTER (OUTPATIENT)
Dept: PHYSICAL THERAPY | Facility: HOSPITAL | Age: 63
Setting detail: THERAPIES SERIES
Discharge: HOME | End: 2018-01-08

## 2018-01-08 DIAGNOSIS — M75.02 ADHESIVE CAPSULITIS OF LEFT SHOULDER: Primary | ICD-10-CM

## 2018-01-08 DIAGNOSIS — M25.612 SHOULDER STIFFNESS, LEFT: ICD-10-CM

## 2018-01-08 DIAGNOSIS — R29.898 SHOULDER WEAKNESS: ICD-10-CM

## 2018-01-08 DIAGNOSIS — M25.512 LEFT SHOULDER PAIN, UNSPECIFIED CHRONICITY: ICD-10-CM

## 2018-01-08 PROCEDURE — 97140 MANUAL THERAPY 1/> REGIONS: CPT | Performed by: PHYSICAL THERAPIST

## 2018-01-08 RX ORDER — MELOXICAM 15 MG/1
TABLET ORAL
Qty: 30 TABLET | Refills: 0 | Status: SHIPPED | OUTPATIENT
Start: 2018-01-08 | End: 2018-02-12 | Stop reason: SDUPTHER

## 2018-01-08 NOTE — THERAPY TREATMENT NOTE
Outpatient Physical Therapy Ortho Treatment Note  [facility]     Patient Name: Becca Alba  : 1955  MRN: [id number]  Today's Date: 2018      Visit Date: 2018    Visit Dx:    ICD-10-CM ICD-9-CM   1. Adhesive capsulitis of left shoulder M75.02 726.0   2. Left shoulder pain, unspecified chronicity M25.512 719.41   3. Shoulder stiffness, left M25.612 719.51   4. Shoulder weakness R29.898 719.61       Patient Active Problem List   Diagnosis   • Hyperlipidemia   • Leukopenia   • Osteoporosis   • Uncomplicated asthma        Past Medical History:   Diagnosis Date   • Allergic rhinitis    • Atypical chest pain    • Chronic leukopenia    • Depression    • H/O bone density study 2015   • H/O mammogram 2015   • History of EKG 2015   • History of fever    • History of herpes genitalis    • Hyperlipidemia    • IBS (irritable bowel syndrome)    • Osteopenia    • Rib pain on left side    • Rib pain on left side    • Tinnitus         Past Surgical History:   Procedure Laterality Date   • BLADDER SURGERY     • COLONOSCOPY  2012   • NEUROMA SURGERY  2014                             PT Assessment/Plan       18 4213       PT Assessment    Assessment Comments Patient with increased pain today, possibly related to not taking Mobic as early as she normally does.  Patient limited with pain/guarding during PROM/stretching 2/2 pain - ecouraged breathing and relaxation to help improve tolerance.    -RA     PT Plan    PT Plan Comments Continue skilled therapy for L shoulder pain, mobility, and function.  -RA       User Key  (r) = Recorded By, (t) = Taken By, (c) = Cosigned By    Initials Name Provider Type    POLLY Lundy, PT Physical Therapist                Modalities       18 1600          Ice    Ice Applied Yes  -RA      Location L shoulder   -RA      Rx Minutes 10 mins  -RA      Ice S/P Rx Yes  -RA        User Key  (r) = Recorded By, (t) = Taken By, (c) =  Cosigned By    Initials Name Provider Type    POLLY Lundy PT Physical Therapist                Exercises       01/08/18 1600          Subjective Comments    Subjective Comments Have more pain today - have been much more aware of my shoulder today.  Did realize I didn't take Mobic this am but have missed it before and not noticed my shoulder as much as I do today.    -RA      Subjective Pain    Able to rate subjective pain? yes  -RA      Pre-Treatment Pain Level 5  -RA      Post-Treatment Pain Level 5  -RA      Exercise 1    Exercise Name 1 verbal review of current HEP - to work on ROM/stretching 2x day  -RA        User Key  (r) = Recorded By, (t) = Taken By, (c) = Cosigned By    Initials Name Provider Type    POLLY Lundy PT Physical Therapist                        Manual Rx (last 36 hours)      Manual Treatments       01/08/18 1600          Manual Rx 1    Manual Rx 1 Location Left glenohumeral joint  -RA      Manual Rx 1 Type Post, lateral, ant and posterior glides   -RA      Manual Rx 1 Grade Grade III-IV  -RA      Manual Rx 2    Manual Rx 2 Location Left deltoid  -RA      Manual Rx 2 Type STM through clothing   -RA      Manual Rx 3    Manual Rx 3 Location Left UT   -RA      Manual Rx 3 Type STM, lateral shoulder telescoping  -RA      Manual Rx 4    Manual Rx 4 Location Left shoulder  -RA      Manual Rx 4 Type Passive stretching between mobilizations to facilitate shoulder flexion, abduction, IR and ER.  Intermittent GH joint distraction/oscillations.  -RA      Manual Rx 4 Duration Total manual therapy time 41 min  -RA        User Key  (r) = Recorded By, (t) = Taken By, (c) = Cosigned By    Initials Name Provider Type    POLLY Lundy PT Physical Therapist              Therapy Education  Given: HEP, Posture/body mechanics, Symptoms/condition management  Program: Reinforced  How Provided: Verbal, Demonstration  Provided to: Patient  Level of Understanding: Verbalized              Time  Calculation:   Start Time: 1609  Stop Time: 1700  Time Calculation (min): 51 min    Therapy Charges for Today     Code Description Service Date Service Provider Modifiers Qty    32501097744 HC PT MANUAL THERAPY EA 15 MIN 1/8/2018 Patricia Lundy, PT GP 3    00109964144 HC PT HOT OR COLD PACK TREAT MCARE 1/8/2018 Patricia Lundy, PT GP 1                    Patricia Lundy, PT  1/8/2018

## 2018-01-11 ENCOUNTER — HOSPITAL ENCOUNTER (OUTPATIENT)
Dept: PHYSICAL THERAPY | Facility: HOSPITAL | Age: 63
Setting detail: THERAPIES SERIES
Discharge: HOME | End: 2018-01-11

## 2018-01-11 DIAGNOSIS — R29.898 SHOULDER WEAKNESS: ICD-10-CM

## 2018-01-11 DIAGNOSIS — M75.02 ADHESIVE CAPSULITIS OF LEFT SHOULDER: Primary | ICD-10-CM

## 2018-01-11 DIAGNOSIS — M25.512 LEFT SHOULDER PAIN, UNSPECIFIED CHRONICITY: ICD-10-CM

## 2018-01-11 DIAGNOSIS — M25.612 SHOULDER STIFFNESS, LEFT: ICD-10-CM

## 2018-01-11 PROCEDURE — 97140 MANUAL THERAPY 1/> REGIONS: CPT | Performed by: PHYSICAL THERAPIST

## 2018-01-11 PROCEDURE — 97110 THERAPEUTIC EXERCISES: CPT | Performed by: PHYSICAL THERAPIST

## 2018-01-11 NOTE — THERAPY TREATMENT NOTE
Outpatient Physical Therapy Ortho Treatment Note  [facility]     Patient Name: Becca Alba  : 1955  MRN: [id number]  Today's Date: 2018      Visit Date: 2018    Visit Dx:    ICD-10-CM ICD-9-CM   1. Adhesive capsulitis of left shoulder M75.02 726.0   2. Left shoulder pain, unspecified chronicity M25.512 719.41   3. Shoulder stiffness, left M25.612 719.51   4. Shoulder weakness R29.898 719.61       Patient Active Problem List   Diagnosis   • Hyperlipidemia   • Leukopenia   • Osteoporosis   • Uncomplicated asthma        Past Medical History:   Diagnosis Date   • Allergic rhinitis    • Atypical chest pain    • Chronic leukopenia    • Depression    • H/O bone density study 2015   • H/O mammogram 2015   • History of EKG 2015   • History of fever    • History of herpes genitalis    • Hyperlipidemia    • IBS (irritable bowel syndrome)    • Osteopenia    • Rib pain on left side    • Rib pain on left side    • Tinnitus         Past Surgical History:   Procedure Laterality Date   • BLADDER SURGERY     • COLONOSCOPY  2012   • NEUROMA SURGERY  2014             PT Assessment/Plan       18 1467       PT Assessment    Assessment Comments Becca tolerated treatment well.  She progressed passive exercises without issue today.    -MP     PT Plan    PT Plan Comments Continue progressing therapeutic exercises as indicated.  -MP       User Key  (r) = Recorded By, (t) = Taken By, (c) = Cosigned By    Initials Name Provider Type    LELAND Urbina, PT Physical Therapist                Modalities       18 1500          Subjective Comments    Subjective Comments Becca is noticing a little better movement.  Pain may be slightly worse due to movement.  -MP      Subjective Pain    Able to rate subjective pain? yes  -MP      Pre-Treatment Pain Level 3  -MP      Post-Treatment Pain Level 4  -MP      Ice    Ice Applied Yes  -MP      Location L shoulder  -MP      Rx  Minutes 10 mins  -MP      Ice S/P Rx Yes  -MP        User Key  (r) = Recorded By, (t) = Taken By, (c) = Cosigned By    Initials Name Provider Type    LELAND Urbina PT Physical Therapist                Exercises       01/11/18 1500          Subjective Comments    Subjective Comments Becca is noticing a little better movement.  Pain may be slightly worse due to movement.  -MP      Subjective Pain    Able to rate subjective pain? yes  -MP      Pre-Treatment Pain Level 3  -MP      Post-Treatment Pain Level 4  -MP        User Key  (r) = Recorded By, (t) = Taken By, (c) = Cosigned By    Initials Name Provider Type    LELAND Urbina PT Physical Therapist             Manual Rx (last 36 hours)      Manual Treatments       01/11/18 1500          Manual Rx 1    Manual Rx 1 Location Left glenohumeral joint  -MP      Manual Rx 1 Type Post, lateral, ant and posterior glides   -MP      Manual Rx 1 Grade Grade III-IV  -MP        User Key  (r) = Recorded By, (t) = Taken By, (c) = Cosigned By    Initials Name Provider Type    LELAND Urbina PT Physical Therapist                PT OP Goals       01/11/18 1500       PT Short Term Goals    STG Date to Achieve 02/14/18  -MP     STG 1 Patient will demonstrate an independent HEP progressed for L shoulder flexibility and be compliant with regular performance   -MP     STG 1 Progress New  -MP     STG 2 Patient will demonstrate increased L shoulder PROM to at least 155 degrees flexion, 140 degrees abduction, 65 degrees ER and 65 degrees IR   -MP     STG 2 Progress New  -MP     STG 3 Patient will demonstrate increased L shoulder AROM to at least 150 degrees flexion, 150 degrees abduction, 60 degrees ER and IR to reach central T/L junction   -MP     STG 3 Progress New  -MP     STG 4 Patient will increase L shoulder strength to 4/5   -MP     STG 4 Progress New  -MP     Long Term Goals    LTG Date to Achieve 04/01/18  -MP     LTG 1 Patient will demonstrates increased L shoulder PROM and  AROM to 90% of R side   -MP     LTG 1 Progress New  -MP     LTG 2 Patient will increase L shoulder strength to 4+/5   -MP     LTG 2 Progress New  -MP     LTG 3 Patient will report no disability with use of L shoulder with ADLs   -MP     LTG 3 Progress New  -MP       User Key  (r) = Recorded By, (t) = Taken By, (c) = Cosigned By    Initials Name Provider Type    MP Fran Urbina PT Physical Therapist          Therapy Education  Education Details: Progressed PROM exercises at home with supine flexion, supine wand ER and sidelying sleeper stretch  Given: HEP, Symptoms/condition management  Program: Reinforced  How Provided: Written  Provided to: Patient  Level of Understanding: Teach back education performed    Time Calculation:   Start Time: 1510  Stop Time: 1600  Time Calculation (min): 50 min    Therapy Charges for Today     Code Description Service Date Service Provider Modifiers Qty    99755168015  PT THER PROC EA 15 MIN 1/11/2018 Fran Urbina PT GP 2    21480211424 HC PT MANUAL THERAPY EA 15 MIN 1/11/2018 Fran Urbina PT GP 1          Fran Urbina PT  1/11/2018

## 2018-01-15 ENCOUNTER — OUTSIDE FACILITY SERVICE (OUTPATIENT)
Dept: SURGERY | Facility: CLINIC | Age: 63
End: 2018-01-15

## 2018-01-15 PROCEDURE — 45378 DIAGNOSTIC COLONOSCOPY: CPT | Performed by: SURGERY

## 2018-01-18 ENCOUNTER — HOSPITAL ENCOUNTER (OUTPATIENT)
Dept: PHYSICAL THERAPY | Facility: HOSPITAL | Age: 63
Setting detail: THERAPIES SERIES
Discharge: HOME | End: 2018-01-18

## 2018-01-18 DIAGNOSIS — R29.898 SHOULDER WEAKNESS: ICD-10-CM

## 2018-01-18 DIAGNOSIS — M25.512 LEFT SHOULDER PAIN, UNSPECIFIED CHRONICITY: ICD-10-CM

## 2018-01-18 DIAGNOSIS — M25.612 SHOULDER STIFFNESS, LEFT: ICD-10-CM

## 2018-01-18 DIAGNOSIS — M75.02 ADHESIVE CAPSULITIS OF LEFT SHOULDER: Primary | ICD-10-CM

## 2018-01-18 PROCEDURE — 97140 MANUAL THERAPY 1/> REGIONS: CPT | Performed by: PHYSICAL THERAPIST

## 2018-01-18 NOTE — THERAPY TREATMENT NOTE
"    Outpatient Physical Therapy Ortho Treatment Note  [facility]     Patient Name: Becca Alba  : 1955  MRN: [id number]  Today's Date: 2018      Visit Date: 2018    Visit Dx:    ICD-10-CM ICD-9-CM   1. Adhesive capsulitis of left shoulder M75.02 726.0   2. Left shoulder pain, unspecified chronicity M25.512 719.41   3. Shoulder stiffness, left M25.612 719.51   4. Shoulder weakness R29.898 719.61       Patient Active Problem List   Diagnosis   • Hyperlipidemia   • Leukopenia   • Osteoporosis   • Uncomplicated asthma        Past Medical History:   Diagnosis Date   • Allergic rhinitis    • Atypical chest pain    • Chronic leukopenia    • Depression    • H/O bone density study 2015   • H/O mammogram 2015   • History of EKG 2015   • History of fever    • History of herpes genitalis    • Hyperlipidemia    • IBS (irritable bowel syndrome)    • Osteopenia    • Rib pain on left side    • Rib pain on left side    • Tinnitus         Past Surgical History:   Procedure Laterality Date   • BLADDER SURGERY     • COLONOSCOPY  2012   • NEUROMA SURGERY  2014                             PT Assessment/Plan       18 1622       PT Assessment    Assessment Comments Becca still apprehensive of increased pain with stretching due to frozen shoulder  -PB       User Key  (r) = Recorded By, (t) = Taken By, (c) = Cosigned By    Initials Name Provider Type    HILARIO Garcia, PT Physical Therapist                    Exercises       18 1600          Subjective Comments    Subjective Comments Shoulder has been more sore  -PB      Subjective Pain    Able to rate subjective pain? yes  -PB      Pre-Treatment Pain Level 4  -PB      Post-Treatment Pain Level 4  -PB      Exercise 1    Exercise Name 1 Issued HEP after performing supine flexion AAROM with isael and hands grasped 10\"/10; supine ER dowel 10\"/10; standing IR hands behind back and on hips.   -PB        User Key  (r) = " Recorded By, (t) = Taken By, (c) = Cosigned By    Initials Name Provider Type    PB Gwen Garcia, SARAH Physical Therapist                        Manual Rx (last 36 hours)      Manual Treatments       01/18/18 1500          Manual Rx 1    Manual Rx 1 Location Left glenohumeral joint  -PB      Manual Rx 1 Type Post, lateral, ant and posterior glides   -PB      Manual Rx 1 Grade Grade III-IV  -PB      Manual Rx 2    Manual Rx 2 Location Left deltoid  -PB      Manual Rx 2 Type STM through clothing   -PB      Manual Rx 3    Manual Rx 3 Location Left UT   -PB      Manual Rx 3 Type STM   -PB      Manual Rx 4    Manual Rx 4 Location Left shoulder  -PB      Manual Rx 4 Type Passive stretching between mobilizations to facilitate shoulder flexion, abduction, IR and ER.  Intermittent GH joint distraction/oscillations.  -PB      Manual Rx 5    Manual Rx 5 Location Left scapula  -PB      Manual Rx 5 Type Scapular mobs   -PB      Manual Rx 5 Duration Total manual therapy 40 min  -PB        User Key  (r) = Recorded By, (t) = Taken By, (c) = Cosigned By    Initials Name Provider Type    PB Gwen Garcia PT Physical Therapist              Therapy Education  Education Details: May use heat and /or ice for pain relief and HEP  Given: HEP, Pain management  Program: Reinforced  How Provided: Verbal, Demonstration, Written  Provided to: Patient  Level of Understanding: Teach back education performed, Verbalized, Demonstrated              Time Calculation:   Start Time: 1520  Stop Time: 1615  Time Calculation (min): 55 min    Therapy Charges for Today     Code Description Service Date Service Provider Modifiers Qty    56211154875 HC PT MANUAL THERAPY EA 15 MIN 1/18/2018 Gwen Garcia PT GP 3                    Gwen Garcia PT  1/18/2018

## 2018-01-22 ENCOUNTER — HOSPITAL ENCOUNTER (OUTPATIENT)
Dept: PHYSICAL THERAPY | Facility: HOSPITAL | Age: 63
Setting detail: THERAPIES SERIES
Discharge: HOME | End: 2018-01-22

## 2018-01-22 DIAGNOSIS — M25.612 SHOULDER STIFFNESS, LEFT: ICD-10-CM

## 2018-01-22 DIAGNOSIS — M75.02 ADHESIVE CAPSULITIS OF LEFT SHOULDER: Primary | ICD-10-CM

## 2018-01-22 DIAGNOSIS — M25.512 LEFT SHOULDER PAIN, UNSPECIFIED CHRONICITY: ICD-10-CM

## 2018-01-22 DIAGNOSIS — R29.898 SHOULDER WEAKNESS: ICD-10-CM

## 2018-01-22 PROCEDURE — 97140 MANUAL THERAPY 1/> REGIONS: CPT | Performed by: PHYSICAL THERAPIST

## 2018-01-22 NOTE — THERAPY TREATMENT NOTE
Outpatient Physical Therapy Ortho Treatment Note  [facility]     Patient Name: Becca Alba  : 1955  MRN: [id number]  Today's Date: 2018      Visit Date: 2018    Visit Dx:    ICD-10-CM ICD-9-CM   1. Adhesive capsulitis of left shoulder M75.02 726.0   2. Left shoulder pain, unspecified chronicity M25.512 719.41   3. Shoulder stiffness, left M25.612 719.51   4. Shoulder weakness R29.898 719.61       Patient Active Problem List   Diagnosis   • Hyperlipidemia   • Leukopenia   • Osteoporosis   • Uncomplicated asthma        Past Medical History:   Diagnosis Date   • Allergic rhinitis    • Atypical chest pain    • Chronic leukopenia    • Depression    • H/O bone density study 2015   • H/O mammogram 2015   • History of EKG 2015   • History of fever    • History of herpes genitalis    • Hyperlipidemia    • IBS (irritable bowel syndrome)    • Osteopenia    • Rib pain on left side    • Rib pain on left side    • Tinnitus         Past Surgical History:   Procedure Laterality Date   • BLADDER SURGERY     • COLONOSCOPY  2012   • NEUROMA SURGERY  2014             PT Ortho       18 1700    Left Shoulder    Flexion AROM Deficit 135  -PB    ABduction AROM Deficit 146  -PB    External Rotation AROM Deficit 54  -PB    Internal Rotation AROM Deficit Slightly lower than L iliac crest   -PB      User Key  (r) = Recorded By, (t) = Taken By, (c) = Cosigned By    Initials Name Provider Type    HILARIO Garcia, PT Physical Therapist                            PT Assessment/Plan       18 1740       PT Assessment    Assessment Comments L shoulder AROM only slightly better except for IR reach behind back slightly tighter.  Shoulder pain still a complaint. Patient wanting to decrease frequency to weekly to extend visits longer due to limited yearly amount.   -PB       User Key  (r) = Recorded By, (t) = Taken By, (c) = Cosigned By    Initials Name Provider Type    PB  Gwen Garcia PT Physical Therapist                Modalities       01/22/18 1700          Ice    Location L shoulder  -PB      Rx Minutes 10 mins  -PB      Ice S/P Rx Yes  -PB        User Key  (r) = Recorded By, (t) = Taken By, (c) = Cosigned By    Initials Name Provider Type    PB Gwen Garcia PT Physical Therapist                Exercises       01/22/18 1700          Subjective Comments    Subjective Comments Patient reports greater difficulty reaching behind her back than it was and still sore.   -PB      Subjective Pain    Able to rate subjective pain? yes  -PB      Pre-Treatment Pain Level 4  -PB      Post-Treatment Pain Level 4  -PB      Exercise 1    Exercise Name 1 Patient agrees to decrease frequency to weekly in order to extend therapy length of time and to continue with HEP.   -PB        User Key  (r) = Recorded By, (t) = Taken By, (c) = Cosigned By    Initials Name Provider Type    PB Gwen Garcia PT Physical Therapist                        Manual Rx (last 36 hours)      Manual Treatments       01/22/18 1700          Manual Rx 1    Manual Rx 1 Location Left glenohumeral joint  -PB      Manual Rx 1 Type Post, lateral, ant and posterior glides   -PB      Manual Rx 1 Grade Grade III-IV  -PB      Manual Rx 2    Manual Rx 2 Location Left deltoid  -PB      Manual Rx 2 Type STM to skin   -PB      Manual Rx 3    Manual Rx 3 Location Left UT   -PB      Manual Rx 3 Type STM   -PB      Manual Rx 4    Manual Rx 4 Location Left shoulder  -PB      Manual Rx 4 Type Passive stretching between mobilizations to facilitate shoulder flexion, abduction, IR and ER.  Intermittent GH joint distraction/oscillations.  -PB      Manual Rx 5    Manual Rx 5 Location Left scapula  -PB      Manual Rx 5 Type Scapular mobs   -PB      Manual Rx 5 Duration Total manual therapy 40 min  -PB        User Key  (r) = Recorded By, (t) = Taken By, (c) = Cosigned By    Initials Name Provider Type    PB Gwen Garcia PT Physical  Therapist                             Time Calculation:   Start Time: 1600  Stop Time: 1700  Time Calculation (min): 60 min    Therapy Charges for Today     Code Description Service Date Service Provider Modifiers Qty    43166967499 HC PT MANUAL THERAPY EA 15 MIN 1/22/2018 Gwen Garcia, PT GP 3                    Gwen Garcia, PT  1/22/2018

## 2018-01-25 ENCOUNTER — APPOINTMENT (OUTPATIENT)
Dept: PHYSICAL THERAPY | Facility: HOSPITAL | Age: 63
End: 2018-01-25

## 2018-01-29 ENCOUNTER — APPOINTMENT (OUTPATIENT)
Dept: PHYSICAL THERAPY | Facility: HOSPITAL | Age: 63
End: 2018-01-29

## 2018-01-30 ENCOUNTER — OFFICE VISIT (OUTPATIENT)
Dept: INTERNAL MEDICINE | Facility: CLINIC | Age: 63
End: 2018-01-30

## 2018-01-30 VITALS
HEIGHT: 63 IN | DIASTOLIC BLOOD PRESSURE: 78 MMHG | OXYGEN SATURATION: 96 % | WEIGHT: 148 LBS | TEMPERATURE: 99.9 F | HEART RATE: 101 BPM | BODY MASS INDEX: 26.22 KG/M2 | SYSTOLIC BLOOD PRESSURE: 110 MMHG

## 2018-01-30 DIAGNOSIS — R68.89 FLU-LIKE SYMPTOMS: Primary | ICD-10-CM

## 2018-01-30 LAB
EXPIRATION DATE: NORMAL
INTERNAL CONTROL: NORMAL
Lab: NORMAL

## 2018-01-30 PROCEDURE — 99213 OFFICE O/P EST LOW 20 MIN: CPT | Performed by: INTERNAL MEDICINE

## 2018-01-30 PROCEDURE — 87804 INFLUENZA ASSAY W/OPTIC: CPT | Performed by: INTERNAL MEDICINE

## 2018-01-30 RX ORDER — OSELTAMIVIR PHOSPHATE 75 MG/1
75 CAPSULE ORAL 2 TIMES DAILY
Qty: 10 CAPSULE | Refills: 0 | Status: SHIPPED | OUTPATIENT
Start: 2018-01-30 | End: 2018-03-02

## 2018-01-30 NOTE — PROGRESS NOTES
Subjective     Becca Alba is a 62 y.o. female who presents with   Chief Complaint   Patient presents with   • Fever   • Generalized Body Aches   • Chills       History of Present Illness     She awakened with a fever.  Severe muscle aches.  Fever up to 101.  Head congestion.  No cough.  Nausea is associated.  Her daughter has had a flu like illness as well but tested negative.     Review of Systems   Constitutional: Positive for fatigue.   HENT: Positive for congestion, postnasal drip, rhinorrhea and sneezing.    Respiratory: Negative.    Cardiovascular: Negative.    Gastrointestinal: Positive for nausea.   Musculoskeletal: Positive for arthralgias.       The following portions of the patient's history were reviewed and updated as appropriate: allergies, current medications and problem list.    Patient Active Problem List    Diagnosis Date Noted   • Hyperlipidemia 04/25/2016   • Leukopenia 04/25/2016     Note Last Updated: 4/25/2016     Description: chronic s/p CBC evaluation.     • Osteoporosis 04/25/2016     Note Last Updated: 11/20/2017     Fosamax caused stomach upset.  Now started in 12/2016     • Uncomplicated asthma 04/25/2016       Current Outpatient Prescriptions on File Prior to Visit   Medication Sig Dispense Refill   • albuterol (PROVENTIL HFA) 108 (90 BASE) MCG/ACT inhaler Inhale 2 puffs.     • alendronate (FOSAMAX) 70 MG tablet Take  by mouth.     • beclomethasone (QVAR) 40 MCG/ACT inhaler Inhale 2 puffs 2 (two) times a day. 1 inhaler 11   • Coenzyme Q10 (COQ10) 200 MG capsule Take  by mouth.     • LORazepam (ATIVAN) 0.5 MG tablet Take 1 tablet by mouth Every 8 (Eight) Hours As Needed for Anxiety. 20 tablet 0   • meloxicam (MOBIC) 15 MG tablet TAKE ONE TABLET BY MOUTH DAILY 30 tablet 0   • mometasone (ELOCON) 0.1 % cream Apply  topically 2 (Two) Times a Day. 15 g 0   • Multiple Vitamin (MULTI VITAMIN DAILY PO) Take  by mouth.     • valACYclovir (VALTREX) 500 MG tablet TAKE ONE TABLET BY MOUTH  "TWICE A DAY 30 tablet 0     No current facility-administered medications on file prior to visit.        Objective     /78  Pulse 101  Temp 99.9 °F (37.7 °C)  Ht 160 cm (62.99\")  Wt 67.1 kg (148 lb)  SpO2 96%  BMI 26.22 kg/m2    Physical Exam   Constitutional: She is oriented to person, place, and time. She appears well-developed and well-nourished.   HENT:   Head: Normocephalic and atraumatic.   Right Ear: Hearing and tympanic membrane normal.   Left Ear: Hearing and tympanic membrane normal.   Mouth/Throat: No oropharyngeal exudate or posterior oropharyngeal erythema.   Cardiovascular: Normal rate, regular rhythm and normal heart sounds.    Pulmonary/Chest: Effort normal and breath sounds normal.   Neurological: She is alert and oriented to person, place, and time.   Skin: Skin is warm and dry.   Psychiatric: She has a normal mood and affect. Her behavior is normal.       Assessment/Plan   Becca was seen today for fever, generalized body aches and chills.    Diagnoses and all orders for this visit:    Flu-like symptoms  -     POC Influenza A / B    Other orders  -     oseltamivir (TAMIFLU) 75 MG capsule; Take 1 capsule by mouth 2 (Two) Times a Day.        Discussion    Patient presents with an acute respiratory illness c/w the flu although her test was negative.  A prescription for Tamiflu is provided.  Increase rest and fluids.  Warned of pneumonia complication.  The patient will let me know if not feeling better over the next several days.                 Future Appointments  Date Time Provider Department Center   2/1/2018 3:15 PM Gwen Garcia PT  [facility]   11/5/2018 8:10 AM [facility] None   11/21/2018 10:00 AM [facility] None         "

## 2018-02-01 ENCOUNTER — APPOINTMENT (OUTPATIENT)
Dept: PHYSICAL THERAPY | Facility: HOSPITAL | Age: 63
End: 2018-02-01

## 2018-02-06 ENCOUNTER — HOSPITAL ENCOUNTER (OUTPATIENT)
Dept: PHYSICAL THERAPY | Facility: HOSPITAL | Age: 63
Setting detail: THERAPIES SERIES
Discharge: HOME | End: 2018-02-06

## 2018-02-06 PROCEDURE — 97140 MANUAL THERAPY 1/> REGIONS: CPT | Performed by: PHYSICAL THERAPIST

## 2018-02-06 NOTE — THERAPY PROGRESS REPORT/RE-CERT
Outpatient Physical Therapy Ortho Re-Assessment  [facility]     Patient Name: Becca Alba  : 1955  MRN: [id number]  Today's Date: 2018      Visit Date: 2018    Patient Active Problem List   Diagnosis   • Hyperlipidemia   • Leukopenia   • Osteoporosis   • Uncomplicated asthma        Past Medical History:   Diagnosis Date   • Allergic rhinitis    • Atypical chest pain    • Chronic leukopenia    • Depression    • H/O bone density study 2015   • H/O mammogram 2015   • History of EKG 2015   • History of fever    • History of herpes genitalis    • Hyperlipidemia    • IBS (irritable bowel syndrome)    • Osteopenia    • Rib pain on left side    • Rib pain on left side    • Tinnitus         Past Surgical History:   Procedure Laterality Date   • BLADDER SURGERY     • COLONOSCOPY  2012   • NEUROMA SURGERY  2014       Visit Dx:   No diagnosis found.              PT Ortho       18 1600    Subjective Pain    Able to rate subjective pain? yes  -PB    Pre-Treatment Pain Level 5  -PB    Shoulder Girdle Accessory Motions    Posterior glide of humerus Hypomobile  -PB    Anterior glide of humerus Hypomobile  -PB    Lateral distraction Hypomobile  -PB    Long axis distraction Hypomobile  -PB    Left Shoulder    Flexion AROM Deficit 130  -PB    ABduction AROM Deficit 131  -PB    External Rotation AROM Deficit 46  -PB    Internal Rotation AROM Deficit L iliac crest   -PB    Right Shoulder    Flexion Gross Movement (4+/5) good plus  -PB    ABduction Gross Movement (4+/5) good plus  -PB    Int Rotation Gross Movement (5/5) normal  -PB    Ext Rotation Gross Movement (5/5) normal  -PB      User Key  (r) = Recorded By, (t) = Taken By, (c) = Cosigned By    Initials Name Provider Type    PB Gwen Garcia, PT Physical Therapist                                  PT OP Goals       18 1600       PT Short Term Goals    STG Date to Achieve 18  -PB     STG 1 Patient will  demonstrate an independent HEP progressed for L shoulder flexibility and be compliant with regular performance   -PB     STG 1 Progress Partially Met  -PB     STG 2 Patient will demonstrate increased L shoulder PROM to at least 155 degrees flexion, 140 degrees abduction, 65 degrees ER and 65 degrees IR   -PB     STG 2 Progress Ongoing  -PB     STG 3 Patient will demonstrate increased L shoulder AROM to at least 150 degrees flexion, 150 degrees abduction, 60 degrees ER and IR to reach central T/L junction   -PB     STG 3 Progress Ongoing  -PB     STG 4 Patient will increase L shoulder strength to 4/5   -PB     STG 4 Progress Met  -PB     Long Term Goals    LTG Date to Achieve 04/01/18  -PB     LTG 1 Patient will demonstrates increased L shoulder PROM and AROM to 90% of R side   -PB     LTG 1 Progress Ongoing  -PB     LTG 2 Patient will increase L shoulder strength to 4+/5   -PB     LTG 2 Progress Met  -PB     LTG 3 Patient will report no disability with use of L shoulder with ADLs   -PB     LTG 3 Progress Ongoing  -PB       User Key  (r) = Recorded By, (t) = Taken By, (c) = Cosigned By    Initials Name Provider Type    PB Gwen Garcia, PT Physical Therapist                PT Assessment/Plan       02/06/18 4138       PT Assessment    Functional Limitations Limitations in functional capacity and performance;Performance in self-care ADL  -PB     Impairments Pain;Muscle strength;Range of motion;Joint mobility  -PB     Assessment Comments Ms. Alba has been seen for 7 sessions physical therapy for left shoulder adhesive capsulitis. She has received joint and soft tissue mobilization and HEP to help to restore left shoulder ROM.  Today she reports she strained shoulder last evening picking up heavy bag resulting in increased pain and reduced ROm compared to last visit.  She also had the flu last week and was not able to perform stretching HEP resulting in decreased ROM. She will benefit to continue physical therapy  for L shoulder ROM.   -PB     Please refer to paper survey for additional self-reported information Yes  -PB     Rehab Potential Good  -PB     Patient/caregiver participated in establishment of treatment plan and goals Yes  -PB     Patient would benefit from skilled therapy intervention Yes  -PB     PT Plan    PT Frequency 1x/week  -PB     Predicted Duration of Therapy Intervention (days/wks) 60 days   -PB     Planned CPT's? PT MANUAL THERAPY EA 15 MIN: 10976;PT THER PROC EA 15 MIN: 10828;PT ELECTRICAL STIM UNATTEND: ;PT HOT OR COLD PACK TREAT MCARE;PT ULTRASOUND EA 15 MIN: 76225;PT NEUROMUSC RE-EDUCATION EA 15 MIN: 13038  -PB       User Key  (r) = Recorded By, (t) = Taken By, (c) = Cosigned By    Initials Name Provider Type    HILARIO Garcia, PT Physical Therapist                Modalities       02/06/18 1600          Ice    Location L shoulder  -PB      Rx Minutes 10 mins  -PB      Ice S/P Rx Yes  -PB        User Key  (r) = Recorded By, (t) = Taken By, (c) = Cosigned By    Initials Name Provider Type    HILARIO Garcia, PT Physical Therapist              Exercises       02/06/18 1600          Subjective Pain    Able to rate subjective pain? yes  -PB      Pre-Treatment Pain Level 5  -PB        User Key  (r) = Recorded By, (t) = Taken By, (c) = Cosigned By    Initials Name Provider Type    HILARIO Garcia, PT Physical Therapist           Manual Rx (last 36 hours)      Manual Treatments       02/06/18 1700          Manual Rx 1    Manual Rx 1 Location Left glenohumeral joint  -PB      Manual Rx 1 Type Post, lateral, ant and posterior glides   -PB      Manual Rx 1 Grade Grade III-IV  -PB      Manual Rx 2    Manual Rx 2 Location Left deltoid  -PB      Manual Rx 2 Type STM   -PB      Manual Rx 2 Grade Medium pressure   -PB      Manual Rx 3    Manual Rx 3 Location Left UT   -PB      Manual Rx 3 Type STM   -PB      Manual Rx 3 Grade Medium pressure   -PB      Manual Rx 4    Manual Rx 4 Location Left shoulder   -PB      Manual Rx 4 Type Passive stretching between mobilizations to facilitate shoulder flexion, abduction, IR and ER.  Intermittent GH joint distraction/oscillations.  -PB      Manual Rx 5    Manual Rx 5 Location Left scapula  -PB      Manual Rx 5 Type Scapular mobs   -PB      Manual Rx 5 Duration Total manual therapy 40 min  -PB        User Key  (r) = Recorded By, (t) = Taken By, (c) = Cosigned By    Initials Name Provider Type    PB Gwen Garcia, PT Physical Therapist                                Time Calculation:   Start Time: 1610  Stop Time: 1705  Time Calculation (min): 55 min     Therapy Charges for Today     Code Description Service Date Service Provider Modifiers Qty    88078856293 HC PT MANUAL THERAPY EA 15 MIN 2/6/2018 Gwen Garcia, PT GP 3    68078918654 HC PT HOT OR COLD PACK TREAT MCARE 2/6/2018 Gwen Garcia, PT GP 1                    Gwen Garcia, PT  2/6/2018

## 2018-02-12 ENCOUNTER — HOSPITAL ENCOUNTER (OUTPATIENT)
Dept: PHYSICAL THERAPY | Facility: HOSPITAL | Age: 63
Setting detail: THERAPIES SERIES
Discharge: HOME | End: 2018-02-12

## 2018-02-12 DIAGNOSIS — R29.898 SHOULDER WEAKNESS: ICD-10-CM

## 2018-02-12 DIAGNOSIS — M25.512 LEFT SHOULDER PAIN, UNSPECIFIED CHRONICITY: ICD-10-CM

## 2018-02-12 DIAGNOSIS — M75.02 ADHESIVE CAPSULITIS OF LEFT SHOULDER: Primary | ICD-10-CM

## 2018-02-12 DIAGNOSIS — M25.612 SHOULDER STIFFNESS, LEFT: ICD-10-CM

## 2018-02-12 PROCEDURE — 97140 MANUAL THERAPY 1/> REGIONS: CPT | Performed by: PHYSICAL THERAPIST

## 2018-02-12 RX ORDER — LORAZEPAM 0.5 MG/1
TABLET ORAL
Qty: 20 TABLET | Refills: 0 | Status: SHIPPED | OUTPATIENT
Start: 2018-02-12 | End: 2018-03-12 | Stop reason: SDUPTHER

## 2018-02-12 RX ORDER — MELOXICAM 15 MG/1
TABLET ORAL
Qty: 30 TABLET | Refills: 0 | Status: SHIPPED | OUTPATIENT
Start: 2018-02-12 | End: 2018-03-02 | Stop reason: SDUPTHER

## 2018-02-12 NOTE — THERAPY TREATMENT NOTE
Outpatient Physical Therapy Ortho Treatment Note  [facility]     Patient Name: Becca Alba  : 1955  MRN: [id number]  Today's Date: 2018      Visit Date: 2018    Visit Dx:    ICD-10-CM ICD-9-CM   1. Adhesive capsulitis of left shoulder M75.02 726.0   2. Left shoulder pain, unspecified chronicity M25.512 719.41   3. Shoulder stiffness, left M25.612 719.51   4. Shoulder weakness R29.898 719.61       Patient Active Problem List   Diagnosis   • Hyperlipidemia   • Leukopenia   • Osteoporosis   • Uncomplicated asthma        Past Medical History:   Diagnosis Date   • Allergic rhinitis    • Atypical chest pain    • Chronic leukopenia    • Depression    • H/O bone density study 2015   • H/O mammogram 2015   • History of EKG 2015   • History of fever    • History of herpes genitalis    • Hyperlipidemia    • IBS (irritable bowel syndrome)    • Osteopenia    • Rib pain on left side    • Rib pain on left side    • Tinnitus         Past Surgical History:   Procedure Laterality Date   • BLADDER SURGERY     • COLONOSCOPY  2012   • NEUROMA SURGERY  2014                             PT Assessment/Plan       18 1818       PT Assessment    Assessment Comments Patient more tolerable to stretching compared to last week but sharp anterior shoulder pain reported trying to reach behind to lower back   -PB       User Key  (r) = Recorded By, (t) = Taken By, (c) = Cosigned By    Initials Name Provider Type    PB Gwen Garcia, PT Physical Therapist                Modalities       18 1800          Ice    Location L shoulder  -PB      Rx Minutes 10 mins  -PB      Ice S/P Rx Yes  -PB        User Key  (r) = Recorded By, (t) = Taken By, (c) = Cosigned By    Initials Name Provider Type    PB Gwen Garcia, PT Physical Therapist                Exercises       18 1800          Subjective Comments    Subjective Comments Pain not as bad as last week but I have regressed  unable to reach behind to fasten bra  -PB      Subjective Pain    Able to rate subjective pain? yes  -PB      Pre-Treatment Pain Level 4  -PB      Post-Treatment Pain Level 4  -PB      Exercise 1    Exercise Name 1 Active-assisted stretching IR reaching behind back and sleeper stretch on her side.   -PB        User Key  (r) = Recorded By, (t) = Taken By, (c) = Cosigned By    Initials Name Provider Type    PB Gwen Garcia PT Physical Therapist                        Manual Rx (last 36 hours)      Manual Treatments       02/12/18 1700          Manual Rx 1    Manual Rx 1 Location Left glenohumeral joint  -PB      Manual Rx 1 Type Post, lateral, ant and posterior glides   -PB      Manual Rx 1 Grade Grade III-IV  -PB      Manual Rx 4    Manual Rx 4 Location Left shoulder  -PB      Manual Rx 4 Type Passive stretching between mobilizations to facilitate shoulder flexion, abduction, IR and ER.  Intermittent GH joint distraction/oscillations.  -PB      Manual Rx 5    Manual Rx 5 Location Left scapula  -PB      Manual Rx 5 Type Scapular mobs   -PB      Manual Rx 5 Duration Total manual therapy 40 min  -PB        User Key  (r) = Recorded By, (t) = Taken By, (c) = Cosigned By    Initials Name Provider Type    PB Gwen Garcia PT Physical Therapist                             Time Calculation:   Start Time: 1601  Stop Time: 1700  Time Calculation (min): 59 min    Therapy Charges for Today     Code Description Service Date Service Provider Modifiers Qty    19346611788 HC PT MANUAL THERAPY EA 15 MIN 2/12/2018 Gwen Garcia, PT GP 3    68612667431 HC PT HOT OR COLD PACK TREAT MCARE 2/12/2018 Gwen Garcia, PT GP 1                    Gwen Garcia PT  2/12/2018

## 2018-02-19 ENCOUNTER — HOSPITAL ENCOUNTER (OUTPATIENT)
Dept: PHYSICAL THERAPY | Facility: HOSPITAL | Age: 63
Setting detail: THERAPIES SERIES
Discharge: HOME | End: 2018-02-19

## 2018-02-19 PROCEDURE — 97140 MANUAL THERAPY 1/> REGIONS: CPT | Performed by: PHYSICAL THERAPIST

## 2018-02-19 NOTE — THERAPY TREATMENT NOTE
Outpatient Physical Therapy Ortho Treatment Note  [facility]     Patient Name: Becca Alba  : 1955  MRN: [id number]  Today's Date: 2018      Visit Date: 2018    Visit Dx:  No diagnosis found.    Patient Active Problem List   Diagnosis   • Hyperlipidemia   • Leukopenia   • Osteoporosis   • Uncomplicated asthma        Past Medical History:   Diagnosis Date   • Allergic rhinitis    • Atypical chest pain    • Chronic leukopenia    • Depression    • H/O bone density study 2015   • H/O mammogram 2015   • History of EKG 2015   • History of fever    • History of herpes genitalis    • Hyperlipidemia    • IBS (irritable bowel syndrome)    • Osteopenia    • Rib pain on left side    • Rib pain on left side    • Tinnitus         Past Surgical History:   Procedure Laterality Date   • BLADDER SURGERY     • COLONOSCOPY  2012   • NEUROMA SURGERY  2014                             PT Assessment/Plan       18 1745 18 1743    PT Assessment    Assessment Comments  Left shoulder ROM status still presents with moderate stiffness and pain limiting to more aggressive stretching methods so that manual therapy does not feel to be effective for shoulder condition at this time.  I recommended that she contact MD who suggested next step would be an MRI.  I do not want her to be limited in future PT needs.   -PB    PT Plan    PT Plan Comments Holding PT until she follows up back with MD   -PB       User Key  (r) = Recorded By, (t) = Taken By, (c) = Cosigned By    Initials Name Provider Type    HILARIO Garcia, PT Physical Therapist                    Exercises       18 1700          Subjective Comments    Subjective Comments Patient reports shoulder feels like it is getting a little worse and more painful   -PB      Subjective Pain    Able to rate subjective pain? yes  -PB      Pre-Treatment Pain Level 5  -PB      Post-Treatment Pain Level 5  -PB        User Key   (r) = Recorded By, (t) = Taken By, (c) = Cosigned By    Initials Name Provider Type    HILARIO Garcia, PT Physical Therapist                        Manual Rx (last 36 hours)      Manual Treatments       02/19/18 1700          Manual Rx 1    Manual Rx 1 Location Left glenohumeral joint  -PB      Manual Rx 1 Type Post, lateral, ant and posterior glides   -PB      Manual Rx 1 Grade Grade III-IV  -PB      Manual Rx 3    Manual Rx 3 Location Left UT   -PB      Manual Rx 3 Type STM   -PB      Manual Rx 3 Grade Medium pressure   -PB      Manual Rx 4    Manual Rx 4 Location Left shoulder  -PB      Manual Rx 4 Type Passive stretching between mobilizations to facilitate shoulder flexion, abduction, IR and ER.  Intermittent GH joint distraction/oscillations.  -PB      Manual Rx 5    Manual Rx 5 Location Left scapula  -PB      Manual Rx 5 Type Scapular mobs   -PB      Manual Rx 5 Duration Total manual therapy 40 min  -PB        User Key  (r) = Recorded By, (t) = Taken By, (c) = Cosigned By    Initials Name Provider Type    HILARIO Garcia, PT Physical Therapist                PT OP Goals       02/19/18 1700       PT Short Term Goals    STG Date to Achieve 02/14/18  -PB     STG 1 Patient will demonstrate an independent HEP progressed for L shoulder flexibility and be compliant with regular performance   -PB     STG 1 Progress Partially Met  -PB     STG 2 Patient will demonstrate increased L shoulder PROM to at least 155 degrees flexion, 140 degrees abduction, 65 degrees ER and 65 degrees IR   -PB     STG 2 Progress Ongoing  -PB     STG 2 Progress Comments Flex 148, Abd 112, IR 55, ER 48   -PB     STG 3 Patient will demonstrate increased L shoulder AROM to at least 150 degrees flexion, 150 degrees abduction, 60 degrees ER and IR to reach central T/L junction   -PB     STG 3 Progress Ongoing  -PB     STG 4 Patient will increase L shoulder strength to 4/5   -PB     STG 4 Progress Met  -PB       User Key  (r) = Recorded By, (t)  = Taken By, (c) = Cosigned By    Initials Name Provider Type    PB Gwen Garcia, PT Physical Therapist                         Time Calculation:   Start Time: 1605  Stop Time: 1650  Time Calculation (min): 45 min    Therapy Charges for Today     Code Description Service Date Service Provider Modifiers Qty    05382347165 HC PT MANUAL THERAPY EA 15 MIN 2/19/2018 Gwen Garcia, PT GP 3                    Gwen Garcia, PT  2/19/2018

## 2018-03-01 ENCOUNTER — APPOINTMENT (OUTPATIENT)
Dept: PHYSICAL THERAPY | Facility: HOSPITAL | Age: 63
End: 2018-03-01

## 2018-03-02 ENCOUNTER — OFFICE VISIT (OUTPATIENT)
Dept: INTERNAL MEDICINE | Facility: CLINIC | Age: 63
End: 2018-03-02

## 2018-03-02 VITALS
OXYGEN SATURATION: 98 % | SYSTOLIC BLOOD PRESSURE: 110 MMHG | BODY MASS INDEX: 26.22 KG/M2 | HEART RATE: 83 BPM | TEMPERATURE: 98 F | DIASTOLIC BLOOD PRESSURE: 70 MMHG | WEIGHT: 148 LBS

## 2018-03-02 DIAGNOSIS — J01.90 ACUTE SINUSITIS, RECURRENCE NOT SPECIFIED, UNSPECIFIED LOCATION: Primary | ICD-10-CM

## 2018-03-02 DIAGNOSIS — R68.89 FLU-LIKE SYMPTOMS: ICD-10-CM

## 2018-03-02 LAB
EXPIRATION DATE: NORMAL
FLUAV AG NPH QL: NEGATIVE
FLUBV AG NPH QL: NEGATIVE
INTERNAL CONTROL: NORMAL
Lab: NORMAL

## 2018-03-02 PROCEDURE — 87804 INFLUENZA ASSAY W/OPTIC: CPT | Performed by: INTERNAL MEDICINE

## 2018-03-02 PROCEDURE — 99213 OFFICE O/P EST LOW 20 MIN: CPT | Performed by: INTERNAL MEDICINE

## 2018-03-02 RX ORDER — AZITHROMYCIN 250 MG/1
TABLET, FILM COATED ORAL
Qty: 6 TABLET | Refills: 0 | Status: SHIPPED | OUTPATIENT
Start: 2018-03-02 | End: 2018-07-17

## 2018-03-02 RX ORDER — MELOXICAM 15 MG/1
15 TABLET ORAL DAILY
Qty: 90 TABLET | Refills: 3 | Status: SHIPPED | OUTPATIENT
Start: 2018-03-02 | End: 2019-03-14 | Stop reason: SDUPTHER

## 2018-03-02 NOTE — PROGRESS NOTES
Subjective     Becca Alba is a 62 y.o. female who presents with   Chief Complaint   Patient presents with   • Nasal Congestion   • Fever   • Cough       History of Present Illness     Patient sick starting yesterday.  Fever up to 101. Sinus pain and pressure.  No teeth pain.  Achy.  Mild cough.  Tylenol little help.      Review of Systems   Constitutional: Positive for fatigue.   Respiratory: Positive for cough. Negative for shortness of breath and stridor.    Cardiovascular: Negative for chest pain.       The following portions of the patient's history were reviewed and updated as appropriate: allergies, current medications and problem list.    Patient Active Problem List    Diagnosis Date Noted   • Hyperlipidemia 04/25/2016   • Leukopenia 04/25/2016     Note Last Updated: 4/25/2016     Description: chronic s/p CBC evaluation.     • Osteoporosis 04/25/2016     Note Last Updated: 11/20/2017     Fosamax caused stomach upset.  Now started in 12/2016     • Uncomplicated asthma 04/25/2016       Current Outpatient Prescriptions on File Prior to Visit   Medication Sig Dispense Refill   • albuterol (PROVENTIL HFA) 108 (90 BASE) MCG/ACT inhaler Inhale 2 puffs.     • alendronate (FOSAMAX) 70 MG tablet Take  by mouth.     • beclomethasone (QVAR) 40 MCG/ACT inhaler Inhale 2 puffs 2 (two) times a day. 1 inhaler 11   • Coenzyme Q10 (COQ10) 200 MG capsule Take  by mouth.     • LORazepam (ATIVAN) 0.5 MG tablet TAKE ONE TABLET BY MOUTH EVERY 8 HOURS AS NEEDED FOR ANXIETY 20 tablet 0   • mometasone (ELOCON) 0.1 % cream Apply  topically 2 (Two) Times a Day. 15 g 0   • Multiple Vitamin (MULTI VITAMIN DAILY PO) Take  by mouth.     • valACYclovir (VALTREX) 500 MG tablet TAKE ONE TABLET BY MOUTH TWICE A DAY 30 tablet 0   • [DISCONTINUED] meloxicam (MOBIC) 15 MG tablet TAKE ONE TABLET BY MOUTH DAILY 30 tablet 0   • [DISCONTINUED] oseltamivir (TAMIFLU) 75 MG capsule Take 1 capsule by mouth 2 (Two) Times a Day. 10 capsule 0     No  current facility-administered medications on file prior to visit.        Objective     /70  Pulse 83  Temp 98 °F (36.7 °C)  Wt 67.1 kg (148 lb)  SpO2 98%  BMI 26.22 kg/m2    Physical Exam   Constitutional: She is oriented to person, place, and time. She appears well-developed and well-nourished.   HENT:   Head: Normocephalic and atraumatic.   Right Ear: Hearing normal. Tympanic membrane is erythematous.   Left Ear: Hearing and tympanic membrane normal.   Mouth/Throat: No oropharyngeal exudate or posterior oropharyngeal erythema.   Cardiovascular: Normal rate, regular rhythm and normal heart sounds.    Pulmonary/Chest: Effort normal and breath sounds normal.   Neurological: She is alert and oriented to person, place, and time.   Skin: Skin is warm and dry.   Psychiatric: She has a normal mood and affect. Her behavior is normal.       Assessment/Plan   Becca was seen today for nasal congestion, fever and cough.    Diagnoses and all orders for this visit:    Acute sinusitis, recurrence not specified, unspecified location    Flu-like symptoms  -     POC Influenza A / B    Other orders  -     azithromycin (ZITHROMAX Z-CLARK) 250 MG tablet; Take 2 tablets the first day, then 1 tablet daily for 4 days.  -     meloxicam (MOBIC) 15 MG tablet; Take 1 tablet by mouth Daily.        Discussion  Patient presents with an acute sinus infection.  Rx for antibiotics are called in.  The patient is encouraged to take with OTC Mucinex D.  Let me know if not feeling better over the next 3 days or if there is any change in symptoms.           Future Appointments  Date Time Provider Department Center   11/5/2018 8:10 AM [facility] None   11/21/2018 10:00 AM Shefali Back MD [facility] None

## 2018-03-09 ENCOUNTER — APPOINTMENT (OUTPATIENT)
Dept: PHYSICAL THERAPY | Facility: HOSPITAL | Age: 63
End: 2018-03-09

## 2018-03-13 RX ORDER — LORAZEPAM 0.5 MG/1
TABLET ORAL
Qty: 20 TABLET | Refills: 0 | Status: SHIPPED | OUTPATIENT
Start: 2018-03-13 | End: 2018-05-04 | Stop reason: SDUPTHER

## 2018-04-17 ENCOUNTER — DOCUMENTATION (OUTPATIENT)
Dept: PHYSICAL THERAPY | Facility: HOSPITAL | Age: 63
End: 2018-04-17

## 2018-04-17 NOTE — THERAPY DISCHARGE NOTE
Outpatient Physical Therapy Discharge Summary         Patient Name: Becca Alba  : 1955  MRN: [id number]    Today's Date: 2018    Visit Dx:  No diagnosis found.          PT OP Goals     Row Name 18 1500          PT Short Term Goals    STG Date to Achieve 18  -PB     STG 1 Patient will demonstrate an independent HEP progressed for L shoulder flexibility and be compliant with regular performance   -PB     STG 1 Progress Partially Met  -PB     STG 2 Patient will demonstrate increased L shoulder PROM to at least 155 degrees flexion, 140 degrees abduction, 65 degrees ER and 65 degrees IR   -PB     STG 2 Progress Not Met  -PB     STG 3 Patient will demonstrate increased L shoulder AROM to at least 150 degrees flexion, 150 degrees abduction, 60 degrees ER and IR to reach central T/L junction   -PB     STG 3 Progress Not Met  -PB     STG 3 Progress Comments Flex 135, Abd 146, ER 54, IR below iliac crest height   -PB     STG 4 Patient will increase L shoulder strength to 4/5   -PB     STG 4 Progress Not Met  -PB        Long Term Goals    LTG Date to Achieve 18  -PB     LTG 1 Patient will demonstrates increased L shoulder PROM and AROM to 90% of R side   -PB     LTG 1 Progress Not Met  -PB     LTG 2 Patient will increase L shoulder strength to 4+/5   -PB     LTG 2 Progress Not Met  -PB     LTG 3 Patient will report no disability with use of L shoulder with ADLs   -PB     LTG 3 Progress Not Met  -PB       User Key  (r) = Recorded By, (t) = Taken By, (c) = Cosigned By    Initials Name Provider Type    HILARIO Garcia, PT Physical Therapist          OP PT Discharge Summary  Date of Discharge: 18  Reason for Discharge: Lack of progress  Outcomes Achieved: Patient able to partially acheive established goals  Discharge Destination: Home with home program  Discharge Instructions/Additional Comments: Ms. Alba was seen for 8 physical therapy sessions for treatment of L shoulder  pain with adhesive capsulitis.  She received manual therapy for joint mobilization and instruction in HEP for stretching.  She did not make good progress with ROM with continued pain in time frame of 6 weeks so was referred to return to physician.  PT is discontinued.       Time Calculation:                    Gwen Garcia, PT  4/17/2018

## 2018-05-04 RX ORDER — LORAZEPAM 0.5 MG/1
TABLET ORAL
Qty: 20 TABLET | Refills: 0 | Status: SHIPPED | OUTPATIENT
Start: 2018-05-04 | End: 2018-07-09 | Stop reason: SDUPTHER

## 2018-05-07 DIAGNOSIS — E78.5 HYPERLIPIDEMIA, UNSPECIFIED HYPERLIPIDEMIA TYPE: Primary | ICD-10-CM

## 2018-05-11 ENCOUNTER — RESULTS ENCOUNTER (OUTPATIENT)
Dept: INTERNAL MEDICINE | Facility: CLINIC | Age: 63
End: 2018-05-11

## 2018-05-11 DIAGNOSIS — E78.5 HYPERLIPIDEMIA, UNSPECIFIED HYPERLIPIDEMIA TYPE: ICD-10-CM

## 2018-05-12 LAB
ALBUMIN SERPL-MCNC: 4.7 G/DL (ref 3.5–5.2)
ALBUMIN/GLOB SERPL: 2 G/DL
ALP SERPL-CCNC: 49 U/L (ref 39–117)
ALT SERPL-CCNC: 24 U/L (ref 1–33)
AST SERPL-CCNC: 32 U/L (ref 1–32)
BILIRUB SERPL-MCNC: 0.6 MG/DL (ref 0.1–1.2)
BUN SERPL-MCNC: 17 MG/DL (ref 8–23)
BUN/CREAT SERPL: 20.7 (ref 7–25)
CALCIUM SERPL-MCNC: 9.5 MG/DL (ref 8.6–10.5)
CHLORIDE SERPL-SCNC: 101 MMOL/L (ref 98–107)
CHOLEST SERPL-MCNC: 229 MG/DL (ref 0–200)
CO2 SERPL-SCNC: 29.6 MMOL/L (ref 22–29)
CREAT SERPL-MCNC: 0.82 MG/DL (ref 0.57–1)
GFR SERPLBLD CREATININE-BSD FMLA CKD-EPI: 71 ML/MIN/1.73
GFR SERPLBLD CREATININE-BSD FMLA CKD-EPI: 86 ML/MIN/1.73
GLOBULIN SER CALC-MCNC: 2.4 GM/DL
GLUCOSE SERPL-MCNC: 101 MG/DL (ref 65–99)
HDLC SERPL-MCNC: 62 MG/DL (ref 40–60)
LDLC SERPL CALC-MCNC: 149 MG/DL (ref 0–100)
POTASSIUM SERPL-SCNC: 4.1 MMOL/L (ref 3.5–5.2)
PROT SERPL-MCNC: 7.1 G/DL (ref 6–8.5)
SODIUM SERPL-SCNC: 142 MMOL/L (ref 136–145)
TRIGL SERPL-MCNC: 92 MG/DL (ref 0–150)
VLDLC SERPL CALC-MCNC: 18.4 MG/DL (ref 5–40)

## 2018-07-09 RX ORDER — LORAZEPAM 0.5 MG/1
TABLET ORAL
Qty: 20 TABLET | Refills: 0 | Status: SHIPPED | OUTPATIENT
Start: 2018-07-09 | End: 2018-08-09 | Stop reason: SDUPTHER

## 2018-07-17 ENCOUNTER — OFFICE VISIT (OUTPATIENT)
Dept: INTERNAL MEDICINE | Facility: CLINIC | Age: 63
End: 2018-07-17

## 2018-07-17 VITALS
WEIGHT: 144 LBS | DIASTOLIC BLOOD PRESSURE: 76 MMHG | HEART RATE: 84 BPM | BODY MASS INDEX: 25.51 KG/M2 | TEMPERATURE: 98.6 F | OXYGEN SATURATION: 97 % | SYSTOLIC BLOOD PRESSURE: 110 MMHG

## 2018-07-17 DIAGNOSIS — R39.9 UTI SYMPTOMS: Primary | ICD-10-CM

## 2018-07-17 LAB
BILIRUB BLD-MCNC: NEGATIVE MG/DL
CLARITY, POC: CLEAR
COLOR UR: YELLOW
GLUCOSE UR STRIP-MCNC: NEGATIVE MG/DL
KETONES UR QL: NEGATIVE
LEUKOCYTE EST, POC: NEGATIVE
NITRITE UR-MCNC: NEGATIVE MG/ML
PH UR: 6 [PH] (ref 5–8)
PROT UR STRIP-MCNC: ABNORMAL MG/DL
RBC # UR STRIP: ABNORMAL /UL
SP GR UR: 1.02 (ref 1–1.03)
UROBILINOGEN UR QL: ABNORMAL

## 2018-07-17 PROCEDURE — 99213 OFFICE O/P EST LOW 20 MIN: CPT | Performed by: INTERNAL MEDICINE

## 2018-07-17 PROCEDURE — 81003 URINALYSIS AUTO W/O SCOPE: CPT | Performed by: INTERNAL MEDICINE

## 2018-07-17 RX ORDER — NITROFURANTOIN 25; 75 MG/1; MG/1
100 CAPSULE ORAL 2 TIMES DAILY
COMMUNITY
End: 2018-07-17

## 2018-07-17 RX ORDER — CIPROFLOXACIN 500 MG/1
500 TABLET, FILM COATED ORAL 2 TIMES DAILY
Qty: 14 TABLET | Refills: 0 | Status: SHIPPED | OUTPATIENT
Start: 2018-07-17 | End: 2018-07-24

## 2018-07-17 NOTE — PROGRESS NOTES
Subjective     Becca Alba is a 62 y.o. female who presents with No chief complaint on file.      History of Present Illness     Starting around the 4th started with dysuria.  It resolved but came back on the 14th.  She was told UTI and was given nitrofurantoin.  She continues to feel poorly.  Low grade fever to 101 last PM.  Suprapubic tenderness.  Burns to urinate.  She thinks nitrofurantoin is making her nauseated.    Review of Systems   Respiratory: Negative.    Cardiovascular: Negative.        The following portions of the patient's history were reviewed and updated as appropriate: allergies, current medications and problem list.    Patient Active Problem List    Diagnosis Date Noted   • Hyperlipidemia 04/25/2016   • Leukopenia 04/25/2016     Note Last Updated: 4/25/2016     Description: chronic s/p CBC evaluation.     • Osteoporosis 04/25/2016     Note Last Updated: 11/20/2017     Fosamax caused stomach upset.  Now started in 12/2016     • Uncomplicated asthma 04/25/2016       Current Outpatient Prescriptions on File Prior to Visit   Medication Sig Dispense Refill   • albuterol (PROVENTIL HFA) 108 (90 BASE) MCG/ACT inhaler Inhale 2 puffs.     • alendronate (FOSAMAX) 70 MG tablet Take  by mouth.     • beclomethasone (QVAR) 40 MCG/ACT inhaler Inhale 2 puffs 2 (two) times a day. 1 inhaler 11   • Coenzyme Q10 (COQ10) 200 MG capsule Take  by mouth.     • LORazepam (ATIVAN) 0.5 MG tablet TAKE ONE TABLET BY MOUTH EVERY 8 HOURS AS NEEDED FOR ANXIETY 20 tablet 0   • meloxicam (MOBIC) 15 MG tablet Take 1 tablet by mouth Daily. 90 tablet 3   • mometasone (ELOCON) 0.1 % cream Apply  topically 2 (Two) Times a Day. 15 g 0   • Multiple Vitamin (MULTI VITAMIN DAILY PO) Take  by mouth.     • valACYclovir (VALTREX) 500 MG tablet TAKE ONE TABLET BY MOUTH TWICE A DAY 30 tablet 0   • [DISCONTINUED] azithromycin (ZITHROMAX Z-CLARK) 250 MG tablet Take 2 tablets the first day, then 1 tablet daily for 4 days. 6 tablet 0   •  [DISCONTINUED] nitrofurantoin, macrocrystal-monohydrate, (MACROBID) 100 MG capsule Take 1 capsule by mouth Every 12 (Twelve) Hours. 14 capsule 0     No current facility-administered medications on file prior to visit.        Objective     /76   Pulse 84   Temp 98.6 °F (37 °C)   Wt 65.3 kg (144 lb)   SpO2 97%   BMI 25.51 kg/m²     Physical Exam   Constitutional: She is oriented to person, place, and time. She appears well-developed and well-nourished.   HENT:   Head: Normocephalic and atraumatic.   Pulmonary/Chest: Effort normal.   Abdominal: Soft. She exhibits no distension and no mass. There is tenderness. There is no rebound and no guarding.   Mild suprapubic tenderness   Neurological: She is alert and oriented to person, place, and time.   Psychiatric: She has a normal mood and affect. Her behavior is normal.       Assessment/Plan   Diagnoses and all orders for this visit:    UTI symptoms  -     POC Urinalysis Dipstick, Automated  -     Urine Culture - Urine, Urine, Clean Catch    Other orders  -     ciprofloxacin (CIPRO) 500 MG tablet; Take 1 tablet by mouth 2 (Two) Times a Day for 7 days.        Discussion  Patient presents with acute cystitis unresponsive to macrobid which is also giving her side effects.  Change to Cipro.  Urine is send for culture and we will follow up on that.  Let us know if she is not feeling better of the next 48 hours.          Future Appointments  Date Time Provider Department Center   11/5/2018 8:10 AM [facility] None   11/21/2018 10:00 AM MD MICHAEL [facility] None

## 2018-08-10 RX ORDER — LORAZEPAM 0.5 MG/1
TABLET ORAL
Qty: 20 TABLET | Refills: 0 | Status: SHIPPED | OUTPATIENT
Start: 2018-08-10 | End: 2018-09-09 | Stop reason: SDUPTHER

## 2018-09-10 RX ORDER — LORAZEPAM 0.5 MG/1
TABLET ORAL
Qty: 20 TABLET | Refills: 0 | Status: SHIPPED | OUTPATIENT
Start: 2018-09-10 | End: 2018-11-10 | Stop reason: SDUPTHER

## 2018-11-02 DIAGNOSIS — Z00.00 HEALTH CARE MAINTENANCE: Primary | ICD-10-CM

## 2018-11-06 LAB
25(OH)D3+25(OH)D2 SERPL-MCNC: 26 NG/ML (ref 30–100)
ALBUMIN SERPL-MCNC: 4.3 G/DL (ref 3.5–5.2)
ALBUMIN/GLOB SERPL: 1.5 G/DL
ALP SERPL-CCNC: 55 U/L (ref 39–117)
ALT SERPL-CCNC: 11 U/L (ref 1–33)
APPEARANCE UR: CLEAR
AST SERPL-CCNC: 17 U/L (ref 1–32)
BACTERIA #/AREA URNS HPF: NORMAL /HPF
BASOPHILS # BLD AUTO: 0.02 10*3/MM3 (ref 0–0.2)
BASOPHILS NFR BLD AUTO: 0.5 % (ref 0–1.5)
BILIRUB SERPL-MCNC: 0.5 MG/DL (ref 0.1–1.2)
BILIRUB UR QL STRIP: NEGATIVE
BUN SERPL-MCNC: 19 MG/DL (ref 8–23)
BUN/CREAT SERPL: 24.4 (ref 7–25)
CALCIUM SERPL-MCNC: 9 MG/DL (ref 8.6–10.5)
CHLORIDE SERPL-SCNC: 101 MMOL/L (ref 98–107)
CHOLEST SERPL-MCNC: 210 MG/DL (ref 0–200)
CO2 SERPL-SCNC: 28.4 MMOL/L (ref 22–29)
COLOR UR: YELLOW
CREAT SERPL-MCNC: 0.78 MG/DL (ref 0.57–1)
EOSINOPHIL # BLD AUTO: 0.09 10*3/MM3 (ref 0–0.7)
EOSINOPHIL NFR BLD AUTO: 2.4 % (ref 0.3–6.2)
EPI CELLS #/AREA URNS HPF: NORMAL /HPF
ERYTHROCYTE [DISTWIDTH] IN BLOOD BY AUTOMATED COUNT: 14 % (ref 11.7–13)
GLOBULIN SER CALC-MCNC: 2.9 GM/DL
GLUCOSE SERPL-MCNC: 99 MG/DL (ref 65–99)
GLUCOSE UR QL: NEGATIVE
HCT VFR BLD AUTO: 44.4 % (ref 35.6–45.5)
HDLC SERPL-MCNC: 57 MG/DL (ref 40–60)
HGB BLD-MCNC: 14.1 G/DL (ref 11.9–15.5)
HGB UR QL STRIP: NEGATIVE
IMM GRANULOCYTES # BLD: 0 10*3/MM3 (ref 0–0.03)
IMM GRANULOCYTES NFR BLD: 0 % (ref 0–0.5)
KETONES UR QL STRIP: NEGATIVE
LDLC SERPL CALC-MCNC: 135 MG/DL (ref 0–100)
LEUKOCYTE ESTERASE UR QL STRIP: NEGATIVE
LYMPHOCYTES # BLD AUTO: 1.29 10*3/MM3 (ref 0.9–4.8)
LYMPHOCYTES NFR BLD AUTO: 34.9 % (ref 19.6–45.3)
MCH RBC QN AUTO: 28.7 PG (ref 26.9–32)
MCHC RBC AUTO-ENTMCNC: 31.8 G/DL (ref 32.4–36.3)
MCV RBC AUTO: 90.2 FL (ref 80.5–98.2)
MICRO URNS: NORMAL
MICRO URNS: NORMAL
MONOCYTES # BLD AUTO: 0.3 10*3/MM3 (ref 0.2–1.2)
MONOCYTES NFR BLD AUTO: 8.1 % (ref 5–12)
MUCOUS THREADS URNS QL MICRO: PRESENT /HPF
NEUTROPHILS # BLD AUTO: 2 10*3/MM3 (ref 1.9–8.1)
NEUTROPHILS NFR BLD AUTO: 54.1 % (ref 42.7–76)
NITRITE UR QL STRIP: NEGATIVE
PH UR STRIP: 6.5 [PH] (ref 5–7.5)
PLATELET # BLD AUTO: 181 10*3/MM3 (ref 140–500)
POTASSIUM SERPL-SCNC: 4.3 MMOL/L (ref 3.5–5.2)
PROT SERPL-MCNC: 7.2 G/DL (ref 6–8.5)
PROT UR QL STRIP: NEGATIVE
RBC # BLD AUTO: 4.92 10*6/MM3 (ref 3.9–5.2)
RBC #/AREA URNS HPF: NORMAL /HPF
SODIUM SERPL-SCNC: 140 MMOL/L (ref 136–145)
SP GR UR: 1.02 (ref 1–1.03)
TRIGL SERPL-MCNC: 92 MG/DL (ref 0–150)
TSH SERPL DL<=0.005 MIU/L-ACNC: 1.67 MIU/ML (ref 0.27–4.2)
URINALYSIS REFLEX: NORMAL
UROBILINOGEN UR STRIP-MCNC: 0.2 MG/DL (ref 0.2–1)
VLDLC SERPL CALC-MCNC: 18.4 MG/DL (ref 5–40)
WBC # BLD AUTO: 3.7 10*3/MM3 (ref 4.5–10.7)
WBC #/AREA URNS HPF: NORMAL /HPF

## 2018-11-12 RX ORDER — LORAZEPAM 0.5 MG/1
TABLET ORAL
Qty: 20 TABLET | Refills: 0 | Status: SHIPPED | OUTPATIENT
Start: 2018-11-12 | End: 2019-01-09 | Stop reason: SDUPTHER

## 2018-11-21 ENCOUNTER — OFFICE VISIT (OUTPATIENT)
Dept: INTERNAL MEDICINE | Facility: CLINIC | Age: 63
End: 2018-11-21

## 2018-11-21 VITALS
SYSTOLIC BLOOD PRESSURE: 110 MMHG | HEART RATE: 71 BPM | WEIGHT: 150 LBS | DIASTOLIC BLOOD PRESSURE: 80 MMHG | OXYGEN SATURATION: 98 % | HEIGHT: 63 IN | BODY MASS INDEX: 26.58 KG/M2

## 2018-11-21 DIAGNOSIS — M81.0 OSTEOPOROSIS, UNSPECIFIED OSTEOPOROSIS TYPE, UNSPECIFIED PATHOLOGICAL FRACTURE PRESENCE: Primary | ICD-10-CM

## 2018-11-21 DIAGNOSIS — E78.5 HYPERLIPIDEMIA, UNSPECIFIED HYPERLIPIDEMIA TYPE: ICD-10-CM

## 2018-11-21 DIAGNOSIS — Z12.4 ENCOUNTER FOR SCREENING FOR CERVICAL CANCER: ICD-10-CM

## 2018-11-21 DIAGNOSIS — M81.0 OSTEOPOROSIS, UNSPECIFIED OSTEOPOROSIS TYPE, UNSPECIFIED PATHOLOGICAL FRACTURE PRESENCE: ICD-10-CM

## 2018-11-21 DIAGNOSIS — Z00.00 WELL ADULT EXAM: Primary | ICD-10-CM

## 2018-11-21 DIAGNOSIS — H02.539 EYELID LAG: ICD-10-CM

## 2018-11-21 PROCEDURE — 90471 IMMUNIZATION ADMIN: CPT | Performed by: INTERNAL MEDICINE

## 2018-11-21 PROCEDURE — 90674 CCIIV4 VAC NO PRSV 0.5 ML IM: CPT | Performed by: INTERNAL MEDICINE

## 2018-11-21 PROCEDURE — 99396 PREV VISIT EST AGE 40-64: CPT | Performed by: INTERNAL MEDICINE

## 2018-11-21 PROCEDURE — 90632 HEPA VACCINE ADULT IM: CPT | Performed by: INTERNAL MEDICINE

## 2018-11-21 PROCEDURE — 90472 IMMUNIZATION ADMIN EACH ADD: CPT | Performed by: INTERNAL MEDICINE

## 2018-11-21 RX ORDER — SODIUM CHLORIDE 9 MG/ML
250 INJECTION, SOLUTION INTRAVENOUS ONCE
Status: CANCELLED | OUTPATIENT
Start: 2018-11-21

## 2018-11-21 NOTE — PROGRESS NOTES
Subjective     Becca Alba is a 62 y.o. female who presents for a complete physical exam.      History of Present Illness     OP.  She is due for Reclast.    Review of Systems   Constitutional: Negative.    HENT: Negative.    Eyes: Negative.    Respiratory: Negative.    Cardiovascular: Negative.    Gastrointestinal: Negative.    Endocrine: Negative.    Genitourinary: Negative.    Musculoskeletal: Negative.    Skin: Negative.    Allergic/Immunologic: Negative.    Neurological: Negative.    Hematological: Negative.    Psychiatric/Behavioral: Negative.        The following portions of the patient's history were reviewed and updated as appropriate: allergies, current medications, past family history, past medical history, past social history, past surgical history and problem list.  Health maintenance tab was reviewed and updated with the patient.       Patient Active Problem List    Diagnosis Date Noted   • Hyperlipidemia 04/25/2016   • Leukopenia 04/25/2016     Note Last Updated: 4/25/2016     Description: chronic s/p CBC evaluation.     • Osteoporosis 04/25/2016     Note Last Updated: 11/21/2018     Fosamax caused stomach upset.  Now started reclast in 12/2016     • Uncomplicated asthma 04/25/2016       Past Medical History:   Diagnosis Date   • Allergic rhinitis    • Atypical chest pain    • Chronic leukopenia    • Depression    • H/O bone density study 12/21/2015   • H/O mammogram 12/29/2015   • History of EKG 11/09/2015   • History of fever    • History of herpes genitalis    • Hyperlipidemia    • IBS (irritable bowel syndrome)    • Osteopenia    • Rib pain on left side    • Rib pain on left side    • Tinnitus        Past Surgical History:   Procedure Laterality Date   • BLADDER SURGERY     • COLONOSCOPY  07/20/2012   • NEUROMA SURGERY  12/18/2014       Family History   Problem Relation Age of Onset   • Diabetes type II Mother    • Osteoarthritis Mother    • Macular degeneration Father    • Myelodysplastic  syndrome Father    • Heart disease Maternal Grandmother    • Lung cancer Maternal Grandmother    • Colon cancer Maternal Grandmother    • Celiac disease Daughter        Social History     Socioeconomic History   • Marital status:      Spouse name: Not on file   • Number of children: Not on file   • Years of education: Not on file   • Highest education level: Not on file   Social Needs   • Financial resource strain: Not on file   • Food insecurity - worry: Not on file   • Food insecurity - inability: Not on file   • Transportation needs - medical: Not on file   • Transportation needs - non-medical: Not on file   Occupational History   • Not on file   Tobacco Use   • Smoking status: Former Smoker   • Smokeless tobacco: Never Used   Substance and Sexual Activity   • Alcohol use: Yes     Comment: Social   • Drug use: Not on file   • Sexual activity: Not on file   Other Topics Concern   • Not on file   Social History Narrative   • Not on file       Current Outpatient Medications on File Prior to Visit   Medication Sig Dispense Refill   • albuterol (PROVENTIL HFA) 108 (90 BASE) MCG/ACT inhaler Inhale 2 puffs.     • alendronate (FOSAMAX) 70 MG tablet Take  by mouth.     • beclomethasone (QVAR) 40 MCG/ACT inhaler Inhale 2 puffs 2 (two) times a day. 1 inhaler 11   • Coenzyme Q10 (COQ10) 200 MG capsule Take  by mouth.     • LORazepam (ATIVAN) 0.5 MG tablet TAKE ONE TABLET BY MOUTH EVERY 8 HOURS AS NEEDED FOR ANXIETY 20 tablet 0   • meloxicam (MOBIC) 15 MG tablet Take 1 tablet by mouth Daily. 90 tablet 3   • mometasone (ELOCON) 0.1 % cream Apply  topically 2 (Two) Times a Day. 15 g 0   • Multiple Vitamin (MULTI VITAMIN DAILY PO) Take  by mouth.     • valACYclovir (VALTREX) 500 MG tablet TAKE ONE TABLET BY MOUTH TWICE A DAY 30 tablet 0     No current facility-administered medications on file prior to visit.        Allergies   Allergen Reactions   • Codeine    • Hydrocodone    • Nitrofurantoin Nausea Only   •  "Amoxicillin-Pot Clavulanate GI Intolerance   • Pneumovax [Pneumococcal Polysaccharide Vaccine] Rash       Immunization History   Administered Date(s) Administered   • Flu Vaccine Quad PF >18YRS 11/14/2016, 11/20/2017   • Hepatitis A 05/04/2018   • Influenza TIV (IM) 01/01/2014, 11/09/2015   • Pneumococcal Polysaccharide (PPSV23) 11/20/2017   • Tdap 07/22/2008       Objective     /80   Pulse 71   Ht 160 cm (62.99\")   Wt 68 kg (150 lb)   SpO2 98%   BMI 26.58 kg/m²     Physical Exam   Constitutional: She is oriented to person, place, and time. She appears well-developed and well-nourished.   HENT:   Head: Normocephalic and atraumatic.   Right Ear: Hearing, tympanic membrane and external ear normal.   Left Ear: Hearing, tympanic membrane and external ear normal.   Nose: Nose normal.   Mouth/Throat: Oropharynx is clear and moist.   Eyes:   Bilateral lid lag   Neck: Neck supple. No thyromegaly present.   Cardiovascular: Normal rate, regular rhythm and normal heart sounds.   No murmur heard.  Pulmonary/Chest: Effort normal and breath sounds normal. Right breast exhibits no mass. Left breast exhibits no mass.   Abdominal: Soft. She exhibits no distension. There is no hepatosplenomegaly. There is no tenderness.   Genitourinary: Vagina normal and uterus normal. No breast tenderness. There is no lesion on the right labia. There is no lesion on the left labia. Cervix exhibits no motion tenderness, no discharge and no friability. Right adnexum displays no mass and no tenderness. Left adnexum displays no mass and no tenderness.   Lymphadenopathy:     She has no cervical adenopathy.   Neurological: She is alert and oriented to person, place, and time.   Skin: Skin is warm and dry.   Psychiatric: She has a normal mood and affect. Her speech is normal and behavior is normal. Judgment and thought content normal. Cognition and memory are normal.       Assessment/Plan   Becca was seen today for annual exam.    Diagnoses " and all orders for this visit:    Well adult exam    Eyelid lag  -     Ambulatory Referral to Ophthalmology    Hyperlipidemia, unspecified hyperlipidemia type    Osteoporosis, unspecified osteoporosis type, unspecified pathological fracture presence    Encounter for screening for cervical cancer   -     Pap IG, Rfx HPV ASCU    Other orders  -     Hepatitis A Vaccine Adult IM  -     Flucelvax Quad=>4Years (PFS)        Discussion    Patient presents today for a CPE.      Patient follows a healthy diet.   Patient follows an adequate exercise regimen. Mammogram is up to date.   Colon cancer screening is up to date.   Pap smear obtained today. Immunizations are as per orders.   DEXA is up to date.    OP.  Orders placed for Reclast.    HLD.  I recommend a diet high in fruits, vegetables, whole grains, lean meats, nuts and beans.  I recommend limiting red meat, full fat dairy, eggs and processed white carbohydrates.  I recommend aerobic exercise at least 3 days per week.   I have recommended that the patient get the following immunizations:  Shingrix and tdap.      Health Maintenance   Topic Date Due   • ZOSTER VACCINE (1 of 2) 12/20/2007   • TDAP/TD VACCINES (2 - Td) 07/22/2018   • INFLUENZA VACCINE  08/01/2018   • PAP SMEAR  11/09/2018   • ANNUAL PHYSICAL  11/21/2018   • LIPID PANEL  11/05/2019   • MAMMOGRAM  12/22/2019   • DXA SCAN  12/22/2019   • COLONOSCOPY  01/15/2028   • HEPATITIS C SCREENING  Completed            No future appointments.

## 2018-11-24 LAB
CONV .: NORMAL
CYTOLOGIST CVX/VAG CYTO: NORMAL
CYTOLOGY CVX/VAG DOC THIN PREP: NORMAL
DX ICD CODE: NORMAL
HIV 1 & 2 AB SER-IMP: NORMAL
OTHER STN SPEC: NORMAL
PATH REPORT.FINAL DX SPEC: NORMAL
STAT OF ADQ CVX/VAG CYTO-IMP: NORMAL

## 2018-12-18 DIAGNOSIS — M81.0 OSTEOPOROSIS, UNSPECIFIED OSTEOPOROSIS TYPE, UNSPECIFIED PATHOLOGICAL FRACTURE PRESENCE: Primary | ICD-10-CM

## 2018-12-27 ENCOUNTER — LAB (OUTPATIENT)
Dept: INTERNAL MEDICINE | Facility: CLINIC | Age: 63
End: 2018-12-27

## 2018-12-27 LAB
BUN SERPL-MCNC: 16 MG/DL (ref 8–23)
BUN/CREAT SERPL: 18.4 (ref 7–25)
CALCIUM SERPL-MCNC: 9.1 MG/DL (ref 8.6–10.5)
CHLORIDE SERPL-SCNC: 103 MMOL/L (ref 98–107)
CO2 SERPL-SCNC: 27.6 MMOL/L (ref 22–29)
CREAT SERPL-MCNC: 0.87 MG/DL (ref 0.57–1)
GLUCOSE SERPL-MCNC: 107 MG/DL (ref 65–99)
POTASSIUM SERPL-SCNC: 4.2 MMOL/L (ref 3.5–5.2)
SODIUM SERPL-SCNC: 142 MMOL/L (ref 136–145)

## 2018-12-27 PROCEDURE — 90715 TDAP VACCINE 7 YRS/> IM: CPT | Performed by: INTERNAL MEDICINE

## 2018-12-27 PROCEDURE — 90471 IMMUNIZATION ADMIN: CPT | Performed by: INTERNAL MEDICINE

## 2019-01-07 ENCOUNTER — HOSPITAL ENCOUNTER (OUTPATIENT)
Dept: INFUSION THERAPY | Facility: HOSPITAL | Age: 64
Discharge: HOME | End: 2019-01-07
Admitting: INTERNAL MEDICINE

## 2019-01-07 VITALS
BODY MASS INDEX: 26.56 KG/M2 | OXYGEN SATURATION: 96 % | HEART RATE: 81 BPM | RESPIRATION RATE: 16 BRPM | DIASTOLIC BLOOD PRESSURE: 64 MMHG | WEIGHT: 149.91 LBS | TEMPERATURE: 98.2 F | SYSTOLIC BLOOD PRESSURE: 114 MMHG

## 2019-01-07 DIAGNOSIS — M81.0 OSTEOPOROSIS, UNSPECIFIED OSTEOPOROSIS TYPE, UNSPECIFIED PATHOLOGICAL FRACTURE PRESENCE: Primary | ICD-10-CM

## 2019-01-07 PROCEDURE — 96374 THER/PROPH/DIAG INJ IV PUSH: CPT

## 2019-01-07 PROCEDURE — 25010000002 ZOLEDRONIC ACID 5 MG/100ML SOLUTION: Performed by: INTERNAL MEDICINE

## 2019-01-07 PROCEDURE — 96365 THER/PROPH/DIAG IV INF INIT: CPT

## 2019-01-07 RX ORDER — ZOLEDRONIC ACID 5 MG/100ML
5 INJECTION, SOLUTION INTRAVENOUS ONCE
Status: COMPLETED | OUTPATIENT
Start: 2019-01-07 | End: 2019-01-07

## 2019-01-07 RX ORDER — ZOLEDRONIC ACID 5 MG/100ML
5 INJECTION, SOLUTION INTRAVENOUS ONCE
Status: CANCELLED | OUTPATIENT
Start: 2019-01-07

## 2019-01-07 RX ORDER — SODIUM CHLORIDE 9 MG/ML
250 INJECTION, SOLUTION INTRAVENOUS ONCE
Status: CANCELLED | OUTPATIENT
Start: 2019-01-07

## 2019-01-07 RX ORDER — SODIUM CHLORIDE 9 MG/ML
250 INJECTION, SOLUTION INTRAVENOUS ONCE
Status: DISCONTINUED | OUTPATIENT
Start: 2019-01-07 | End: 2019-01-07

## 2019-01-07 RX ADMIN — ZOLEDRONIC ACID 5 MG: 5 INJECTION, SOLUTION INTRAVENOUS at 15:45

## 2019-01-07 NOTE — PROGRESS NOTES
Cockcroft-Gault calculator and calcium level wnl for IV Reclast.  PO water x 2 glasses and she tolerated treatment well.  Discharged after infusion completed as she has had medication previously.  She refused need of AVS for information of diagnosis and medication as she is educated on it.

## 2019-01-09 RX ORDER — LORAZEPAM 0.5 MG/1
TABLET ORAL
Qty: 20 TABLET | Refills: 0 | Status: SHIPPED | OUTPATIENT
Start: 2019-01-09 | End: 2019-08-01 | Stop reason: SDUPTHER

## 2019-03-15 RX ORDER — MELOXICAM 15 MG/1
TABLET ORAL
Qty: 90 TABLET | Refills: 2 | Status: SHIPPED | OUTPATIENT
Start: 2019-03-15 | End: 2021-11-01

## 2019-04-03 ENCOUNTER — APPOINTMENT (OUTPATIENT)
Dept: WOMENS IMAGING | Facility: HOSPITAL | Age: 64
End: 2019-04-03

## 2019-04-03 PROCEDURE — 77063 BREAST TOMOSYNTHESIS BI: CPT | Performed by: RADIOLOGY

## 2019-04-03 PROCEDURE — 77067 SCR MAMMO BI INCL CAD: CPT | Performed by: RADIOLOGY

## 2019-08-02 RX ORDER — LORAZEPAM 0.5 MG/1
TABLET ORAL
Qty: 20 TABLET | Refills: 4 | Status: SHIPPED | OUTPATIENT
Start: 2019-08-02 | End: 2020-01-27

## 2019-10-18 ENCOUNTER — FLU SHOT (OUTPATIENT)
Dept: INTERNAL MEDICINE | Facility: CLINIC | Age: 64
End: 2019-10-18

## 2019-10-18 PROCEDURE — 90674 CCIIV4 VAC NO PRSV 0.5 ML IM: CPT | Performed by: INTERNAL MEDICINE

## 2019-10-18 PROCEDURE — 90471 IMMUNIZATION ADMIN: CPT | Performed by: INTERNAL MEDICINE

## 2019-11-05 DIAGNOSIS — M81.0 OSTEOPOROSIS, UNSPECIFIED OSTEOPOROSIS TYPE, UNSPECIFIED PATHOLOGICAL FRACTURE PRESENCE: ICD-10-CM

## 2019-11-05 DIAGNOSIS — Z00.00 HEALTH CARE MAINTENANCE: Primary | ICD-10-CM

## 2019-11-06 LAB
25(OH)D3+25(OH)D2 SERPL-MCNC: 26.6 NG/ML (ref 30–100)
ALBUMIN SERPL-MCNC: 4.5 G/DL (ref 3.5–5.2)
ALBUMIN/GLOB SERPL: 2 G/DL
ALP SERPL-CCNC: 54 U/L (ref 39–117)
ALT SERPL-CCNC: 19 U/L (ref 1–33)
APPEARANCE UR: CLEAR
AST SERPL-CCNC: 25 U/L (ref 1–32)
BACTERIA #/AREA URNS HPF: NORMAL /HPF
BASOPHILS # BLD AUTO: 0.03 10*3/MM3 (ref 0–0.2)
BASOPHILS NFR BLD AUTO: 1.4 % (ref 0–1.5)
BILIRUB SERPL-MCNC: 0.6 MG/DL (ref 0.2–1.2)
BILIRUB UR QL STRIP: NEGATIVE
BUN SERPL-MCNC: 17 MG/DL (ref 8–23)
BUN/CREAT SERPL: 21.8 (ref 7–25)
CALCIUM SERPL-MCNC: 8.9 MG/DL (ref 8.6–10.5)
CHLORIDE SERPL-SCNC: 103 MMOL/L (ref 98–107)
CHOLEST SERPL-MCNC: 227 MG/DL (ref 0–200)
CO2 SERPL-SCNC: 27.6 MMOL/L (ref 22–29)
COLOR UR: YELLOW
CREAT SERPL-MCNC: 0.78 MG/DL (ref 0.57–1)
EOSINOPHIL # BLD AUTO: 0.1 10*3/MM3 (ref 0–0.4)
EOSINOPHIL NFR BLD AUTO: 4.5 % (ref 0.3–6.2)
EPI CELLS #/AREA URNS HPF: NORMAL /HPF
ERYTHROCYTE [DISTWIDTH] IN BLOOD BY AUTOMATED COUNT: 13 % (ref 12.3–15.4)
GLOBULIN SER CALC-MCNC: 2.3 GM/DL
GLUCOSE SERPL-MCNC: 95 MG/DL (ref 65–99)
GLUCOSE UR QL: NEGATIVE
HCT VFR BLD AUTO: 40.7 % (ref 34–46.6)
HDLC SERPL-MCNC: 59 MG/DL (ref 40–60)
HGB BLD-MCNC: 13.6 G/DL (ref 12–15.9)
HGB UR QL STRIP: NEGATIVE
IMM GRANULOCYTES # BLD AUTO: 0 10*3/MM3 (ref 0–0.05)
IMM GRANULOCYTES NFR BLD AUTO: 0 % (ref 0–0.5)
KETONES UR QL STRIP: NEGATIVE
LDLC SERPL CALC-MCNC: 151 MG/DL (ref 0–100)
LEUKOCYTE ESTERASE UR QL STRIP: NEGATIVE
LYMPHOCYTES # BLD AUTO: 0.84 10*3/MM3 (ref 0.7–3.1)
LYMPHOCYTES NFR BLD AUTO: 38.2 % (ref 19.6–45.3)
MCH RBC QN AUTO: 28.6 PG (ref 26.6–33)
MCHC RBC AUTO-ENTMCNC: 33.4 G/DL (ref 31.5–35.7)
MCV RBC AUTO: 85.5 FL (ref 79–97)
MICRO URNS: NORMAL
MICRO URNS: NORMAL
MONOCYTES # BLD AUTO: 0.21 10*3/MM3 (ref 0.1–0.9)
MONOCYTES NFR BLD AUTO: 9.5 % (ref 5–12)
MUCOUS THREADS URNS QL MICRO: PRESENT /HPF
NEUTROPHILS # BLD AUTO: 1.02 10*3/MM3 (ref 1.7–7)
NEUTROPHILS NFR BLD AUTO: 46.4 % (ref 42.7–76)
NITRITE UR QL STRIP: NEGATIVE
NRBC BLD AUTO-RTO: 0 /100 WBC (ref 0–0.2)
PH UR STRIP: 5.5 [PH] (ref 5–7.5)
PLATELET # BLD AUTO: 174 10*3/MM3 (ref 140–450)
POTASSIUM SERPL-SCNC: 4.3 MMOL/L (ref 3.5–5.2)
PROT SERPL-MCNC: 6.8 G/DL (ref 6–8.5)
PROT UR QL STRIP: NEGATIVE
RBC # BLD AUTO: 4.76 10*6/MM3 (ref 3.77–5.28)
RBC #/AREA URNS HPF: NORMAL /HPF
SODIUM SERPL-SCNC: 143 MMOL/L (ref 136–145)
SP GR UR: 1.02 (ref 1–1.03)
TRIGL SERPL-MCNC: 87 MG/DL (ref 0–150)
TSH SERPL DL<=0.005 MIU/L-ACNC: 1.75 UIU/ML (ref 0.27–4.2)
URINALYSIS REFLEX: NORMAL
UROBILINOGEN UR STRIP-MCNC: 0.2 MG/DL (ref 0.2–1)
VLDLC SERPL CALC-MCNC: 17.4 MG/DL
WBC # BLD AUTO: 2.2 10*3/MM3 (ref 3.4–10.8)
WBC #/AREA URNS HPF: NORMAL /HPF

## 2019-11-25 ENCOUNTER — OFFICE VISIT (OUTPATIENT)
Dept: INTERNAL MEDICINE | Facility: CLINIC | Age: 64
End: 2019-11-25

## 2019-11-25 VITALS
WEIGHT: 147 LBS | HEIGHT: 63 IN | SYSTOLIC BLOOD PRESSURE: 110 MMHG | HEART RATE: 65 BPM | BODY MASS INDEX: 26.05 KG/M2 | DIASTOLIC BLOOD PRESSURE: 64 MMHG | OXYGEN SATURATION: 97 %

## 2019-11-25 DIAGNOSIS — Z00.00 WELL ADULT EXAM: Primary | ICD-10-CM

## 2019-11-25 PROBLEM — G47.09 OTHER INSOMNIA: Status: ACTIVE | Noted: 2019-11-25

## 2019-11-25 PROCEDURE — 99396 PREV VISIT EST AGE 40-64: CPT | Performed by: INTERNAL MEDICINE

## 2019-11-25 NOTE — PATIENT INSTRUCTIONS
Gastroesophageal Reflux Disease, Adult  Gastroesophageal reflux (KEILA) happens when acid from the stomach flows up into the tube that connects the mouth and the stomach (esophagus). Normally, food travels down the esophagus and stays in the stomach to be digested. However, when a person has KEILA, food and stomach acid sometimes move back up into the esophagus. If this becomes a more serious problem, the person may be diagnosed with a disease called gastroesophageal reflux disease (GERD). GERD occurs when the reflux:  · Happens often.  · Causes frequent or severe symptoms.  · Causes problems such as damage to the esophagus.  When stomach acid comes in contact with the esophagus, the acid may cause soreness (inflammation) in the esophagus. Over time, GERD may create small holes (ulcers) in the lining of the esophagus.  What are the causes?  This condition is caused by a problem with the muscle between the esophagus and the stomach (lower esophageal sphincter, or LES). Normally, the LES muscle closes after food passes through the esophagus to the stomach. When the LES is weakened or abnormal, it does not close properly, and that allows food and stomach acid to go back up into the esophagus.  The LES can be weakened by certain dietary substances, medicines, and medical conditions, including:  · Tobacco use.  · Pregnancy.  · Having a hiatal hernia.  · Alcohol use.  · Certain foods and beverages, such as coffee, chocolate, onions, and peppermint.  What increases the risk?  You are more likely to develop this condition if you:  · Have an increased body weight.  · Have a connective tissue disorder.  · Use NSAID medicines.  What are the signs or symptoms?  Symptoms of this condition include:  · Heartburn.  · Difficult or painful swallowing.  · The feeling of having a lump in the throat.  · A bitter taste in the mouth.  · Bad breath.  · Having a large amount of saliva.  · Having an upset or bloated  stomach.  · Belching.  · Chest pain. Different conditions can cause chest pain. Make sure you see your health care provider if you experience chest pain.  · Shortness of breath or wheezing.  · Ongoing (chronic) cough or a night-time cough.  · Wearing away of tooth enamel.  · Weight loss.  How is this diagnosed?  Your health care provider will take a medical history and perform a physical exam. To determine if you have mild or severe GERD, your health care provider may also monitor how you respond to treatment. You may also have tests, including:  · A test to examine your stomach and esophagus with a small camera (endoscopy).  · A test that measures the acidity level in your esophagus.  · A test that measures how much pressure is on your esophagus.  · A barium swallow or modified barium swallow test to show the shape, size, and functioning of your esophagus.  How is this treated?  The goal of treatment is to help relieve your symptoms and to prevent complications. Treatment for this condition may vary depending on how severe your symptoms are. Your health care provider may recommend:  · Changes to your diet.  · Medicine.  · Surgery.  Follow these instructions at home:  Eating and drinking    · Follow a diet as recommended by your health care provider. This may involve avoiding foods and drinks such as:  ? Coffee and tea (with or without caffeine).  ? Drinks that contain alcohol.  ? Energy drinks and sports drinks.  ? Carbonated drinks or sodas.  ? Chocolate and cocoa.  ? Peppermint and mint flavorings.  ? Garlic and onions.  ? Horseradish.  ? Spicy and acidic foods, including peppers, chili powder, elias powder, vinegar, hot sauces, and barbecue sauce.  ? Citrus fruit juices and citrus fruits, such as oranges, aly, and limes.  ? Tomato-based foods, such as red sauce, chili, salsa, and pizza with red sauce.  ? Fried and fatty foods, such as donuts, french fries, potato chips, and high-fat dressings.  ? High-fat  meats, such as hot dogs and fatty cuts of red and white meats, such as rib eye steak, sausage, ham, and valentin.  ? High-fat dairy items, such as whole milk, butter, and cream cheese.  · Eat small, frequent meals instead of large meals.  · Avoid drinking large amounts of liquid with your meals.  · Avoid eating meals during the 2-3 hours before bedtime.  · Avoid lying down right after you eat.  · Do not exercise right after you eat.  Lifestyle    · Do not use any products that contain nicotine or tobacco, such as cigarettes, e-cigarettes, and chewing tobacco. If you need help quitting, ask your health care provider.  · Try to reduce your stress by using methods such as yoga or meditation. If you need help reducing stress, ask your health care provider.  · If you are overweight, reduce your weight to an amount that is healthy for you. Ask your health care provider for guidance about a safe weight loss goal.  General instructions  · Pay attention to any changes in your symptoms.  · Take over-the-counter and prescription medicines only as told by your health care provider. Do not take aspirin, ibuprofen, or other NSAIDs unless your health care provider told you to do so.  · Wear loose-fitting clothing. Do not wear anything tight around your waist that causes pressure on your abdomen.  · Raise (elevate) the head of your bed about 6 inches (15 cm).  · Avoid bending over if this makes your symptoms worse.  · Keep all follow-up visits as told by your health care provider. This is important.  Contact a health care provider if:  · You have:  ? New symptoms.  ? Unexplained weight loss.  ? Difficulty swallowing or it hurts to swallow.  ? Wheezing or a persistent cough.  ? A hoarse voice.  · Your symptoms do not improve with treatment.  Get help right away if you:  · Have pain in your arms, neck, jaw, teeth, or back.  · Feel sweaty, dizzy, or light-headed.  · Have chest pain or shortness of breath.  · Vomit and your vomit looks  like blood or coffee grounds.  · Faint.  · Have stool that is bloody or black.  · Cannot swallow, drink, or eat.  Summary  · Gastroesophageal reflux happens when acid from the stomach flows up into the esophagus. GERD is a disease in which the reflux happens often, causes frequent or severe symptoms, or causes problems such as damage to the esophagus.  · Treatment for this condition may vary depending on how severe your symptoms are. Your health care provider may recommend diet and lifestyle changes, medicine, or surgery.  · Contact a health care provider if you have new or worsening symptoms.  · Take over-the-counter and prescription medicines only as told by your health care provider. Do not take aspirin, ibuprofen, or other NSAIDs unless your health care provider told you to do so.  · Keep all follow-up visits as told by your health care provider. This is important.  This information is not intended to replace advice given to you by your health care provider. Make sure you discuss any questions you have with your health care provider.  Document Released: 09/27/2006 Document Revised: 06/26/2019 Document Reviewed: 06/26/2019  [employer] Interactive Patient Education © 2019 [employer]

## 2019-11-25 NOTE — PROGRESS NOTES
Subjective     Becca Alba is a 63 y.o. female who presents for a complete physical exam.      History of Present Illness     HLD.  Moderate elevation in cholesterol.  We discussed diet.    OP.  She is on yearly Reclast.    Insomnia.  She takes ativan prn for sleep.      Review of Systems   Constitutional: Negative.    HENT: Negative.    Eyes: Negative.    Respiratory: Negative.    Cardiovascular: Negative.    Gastrointestinal: Negative.    Endocrine: Negative.    Genitourinary: Negative.    Musculoskeletal: Negative.    Skin: Negative.    Allergic/Immunologic: Negative.    Neurological: Negative.    Hematological: Negative.    Psychiatric/Behavioral: Negative.        The following portions of the patient's history were reviewed and updated as appropriate: allergies, current medications, past family history, past medical history, past social history, past surgical history and problem list.  Health maintenance tab was reviewed and updated with the patient.       Patient Active Problem List    Diagnosis Date Noted   • Other insomnia 11/25/2019   • Hyperlipidemia 04/25/2016     Note Last Updated: 11/25/2019     2.9% ASCVD risk on 11/25/2019     • Leukopenia 04/25/2016     Note Last Updated: 4/25/2016     Description: chronic s/p CBC evaluation.     • Osteoporosis 04/25/2016     Note Last Updated: 11/21/2018     Fosamax caused stomach upset.  Now started reclast in 12/2016     • Uncomplicated asthma 04/25/2016       Past Medical History:   Diagnosis Date   • Allergic rhinitis    • Atypical chest pain    • Chronic leukopenia    • Depression    • H/O bone density study 12/21/2015   • H/O mammogram 12/29/2015   • History of EKG 11/09/2015   • History of fever    • History of herpes genitalis    • Hyperlipidemia    • IBS (irritable bowel syndrome)    • Osteopenia    • Rib pain on left side    • Rib pain on left side    • Tinnitus        Past Surgical History:   Procedure Laterality Date   • BLADDER SURGERY     •  COLONOSCOPY  07/20/2012   • NEUROMA SURGERY  12/18/2014       Family History   Problem Relation Age of Onset   • Diabetes type II Mother    • Osteoarthritis Mother    • Macular degeneration Father    • Myelodysplastic syndrome Father    • Heart disease Maternal Grandmother    • Lung cancer Maternal Grandmother    • Colon cancer Maternal Grandmother    • Celiac disease Daughter        Social History     Socioeconomic History   • Marital status:      Spouse name: Not on file   • Number of children: Not on file   • Years of education: Not on file   • Highest education level: Not on file   Tobacco Use   • Smoking status: Former Smoker   • Smokeless tobacco: Never Used   Substance and Sexual Activity   • Alcohol use: Yes     Comment: Social       Current Outpatient Medications on File Prior to Visit   Medication Sig Dispense Refill   • albuterol (PROVENTIL HFA) 108 (90 BASE) MCG/ACT inhaler Inhale 2 puffs.     • beclomethasone (QVAR) 40 MCG/ACT inhaler Inhale 2 puffs 2 (two) times a day. 1 inhaler 11   • LORazepam (ATIVAN) 0.5 MG tablet TAKE ONE TABLET BY MOUTH EVERY 8 HOURS AS NEEDED FOR ANXIETY 20 tablet 4   • meloxicam (MOBIC) 15 MG tablet TAKE ONE TABLET BY MOUTH DAILY 90 tablet 2   • Multiple Vitamin (MULTI VITAMIN DAILY PO) Take  by mouth.     • valACYclovir (VALTREX) 500 MG tablet TAKE ONE TABLET BY MOUTH TWICE A DAY 30 tablet 0   • [DISCONTINUED] alendronate (FOSAMAX) 70 MG tablet Take  by mouth.     • [DISCONTINUED] Coenzyme Q10 (COQ10) 200 MG capsule Take  by mouth.     • [DISCONTINUED] mometasone (ELOCON) 0.1 % cream Apply  topically 2 (Two) Times a Day. 15 g 0     No current facility-administered medications on file prior to visit.        Allergies   Allergen Reactions   • Codeine    • Hydrocodone    • Nitrofurantoin Nausea Only   • Amoxicillin-Pot Clavulanate GI Intolerance   • Pneumovax [Pneumococcal Polysaccharide Vaccine] Rash       Immunization History   Administered Date(s) Administered   • Flu  "Vaccine Quad PF >18YRS 11/14/2016, 11/20/2017   • Hepatitis A 05/04/2018, 11/21/2018   • Influenza TIV (IM) 01/01/2014, 11/09/2015   • Pneumococcal Polysaccharide (PPSV23) 11/20/2017   • Shingrix 08/18/2019, 09/01/2019, 10/01/2019   • Tdap 07/22/2008, 12/27/2018   • flucelvax quad pfs =>4 YRS 11/21/2018, 10/18/2019       Objective     /64   Pulse 65   Ht 160 cm (62.99\")   Wt 66.7 kg (147 lb)   SpO2 97%   BMI 26.05 kg/m²     Physical Exam   Constitutional: She is oriented to person, place, and time. She appears well-developed and well-nourished.   HENT:   Head: Normocephalic and atraumatic.   Right Ear: Hearing, tympanic membrane and external ear normal.   Left Ear: Hearing, tympanic membrane and external ear normal.   Nose: Nose normal.   Mouth/Throat: Oropharynx is clear and moist.   Neck: Neck supple. No thyromegaly present.   Cardiovascular: Normal rate, regular rhythm and normal heart sounds.   No murmur heard.  Pulmonary/Chest: Effort normal and breath sounds normal. Right breast exhibits no mass. Left breast exhibits no mass.   Abdominal: Soft. She exhibits no distension. There is no hepatosplenomegaly. There is no tenderness.   Genitourinary: No breast tenderness.   Lymphadenopathy:     She has no cervical adenopathy.   Neurological: She is alert and oriented to person, place, and time.   Skin: Skin is warm and dry.   Psychiatric: She has a normal mood and affect. Her speech is normal and behavior is normal. Judgment and thought content normal. Cognition and memory are normal.       Assessment/Plan   Becca was seen today for annual exam.    Diagnoses and all orders for this visit:    Well adult exam    Other orders  -     diclofenac (VOLTAREN) 1 % gel gel; Apply 2 g topically to the appropriate area as directed 4 (Four) Times a Day.        Discussion    Patient presents today for a CPE.      Patient follows a healthy diet.   Patient follows an adequate exercise regimen. Mammogram is up to date.   " Colon cancer screening is up to date.   Pap smears are performed by the patient's gynecologist.  I have recommended that the patient get the following immunizations:  Shingrix.      Health Maintenance   Topic Date Due   • ZOSTER VACCINE (2 of 2) 10/13/2019   • ANNUAL PHYSICAL  11/22/2019   • DXA SCAN  12/22/2019   • LIPID PANEL  11/05/2020   • MAMMOGRAM  04/03/2021   • PAP SMEAR  11/21/2021   • COLONOSCOPY  01/15/2028   • TDAP/TD VACCINES (3 - Td) 12/27/2028   • HEPATITIS C SCREENING  Completed   • INFLUENZA VACCINE  Completed            Future Appointments   Date Time Provider Department Center   11/25/2020  8:30 AM [facility] None   11/30/2020  3:30 PM Shefali Back MD [facility] None

## 2019-12-23 ENCOUNTER — TELEPHONE (OUTPATIENT)
Dept: INTERNAL MEDICINE | Facility: CLINIC | Age: 64
End: 2019-12-23

## 2019-12-23 RX ORDER — SODIUM CHLORIDE 9 MG/ML
250 INJECTION, SOLUTION INTRAVENOUS ONCE
Status: CANCELLED | OUTPATIENT
Start: 2019-12-23

## 2020-01-02 DIAGNOSIS — M81.0 OSTEOPOROSIS, UNSPECIFIED OSTEOPOROSIS TYPE, UNSPECIFIED PATHOLOGICAL FRACTURE PRESENCE: Primary | ICD-10-CM

## 2020-01-24 DIAGNOSIS — M81.0 OSTEOPOROSIS, UNSPECIFIED OSTEOPOROSIS TYPE, UNSPECIFIED PATHOLOGICAL FRACTURE PRESENCE: Primary | ICD-10-CM

## 2020-01-25 LAB
BUN SERPL-MCNC: 12 MG/DL (ref 8–23)
BUN/CREAT SERPL: 15 (ref 7–25)
CALCIUM SERPL-MCNC: 9.1 MG/DL (ref 8.6–10.5)
CHLORIDE SERPL-SCNC: 103 MMOL/L (ref 98–107)
CO2 SERPL-SCNC: 27.6 MMOL/L (ref 22–29)
CREAT SERPL-MCNC: 0.8 MG/DL (ref 0.57–1)
GLUCOSE SERPL-MCNC: 95 MG/DL (ref 65–99)
POTASSIUM SERPL-SCNC: 4.3 MMOL/L (ref 3.5–5.2)
SODIUM SERPL-SCNC: 141 MMOL/L (ref 136–145)

## 2020-01-26 DIAGNOSIS — F41.9 ANXIETY: Primary | ICD-10-CM

## 2020-01-27 RX ORDER — LORAZEPAM 0.5 MG/1
TABLET ORAL
Qty: 20 TABLET | Refills: 3 | Status: SHIPPED | OUTPATIENT
Start: 2020-01-27 | End: 2020-04-29 | Stop reason: SDUPTHER

## 2020-02-03 ENCOUNTER — HOSPITAL ENCOUNTER (OUTPATIENT)
Dept: INFUSION THERAPY | Facility: HOSPITAL | Age: 65
Discharge: HOME | End: 2020-02-03
Admitting: INTERNAL MEDICINE

## 2020-02-03 VITALS
BODY MASS INDEX: 25.51 KG/M2 | TEMPERATURE: 98.2 F | SYSTOLIC BLOOD PRESSURE: 112 MMHG | WEIGHT: 144 LBS | HEART RATE: 67 BPM | OXYGEN SATURATION: 100 % | RESPIRATION RATE: 20 BRPM | DIASTOLIC BLOOD PRESSURE: 64 MMHG

## 2020-02-03 DIAGNOSIS — M81.0 OSTEOPOROSIS, UNSPECIFIED OSTEOPOROSIS TYPE, UNSPECIFIED PATHOLOGICAL FRACTURE PRESENCE: Primary | ICD-10-CM

## 2020-02-03 PROCEDURE — 25010000002 ZOLEDRONIC ACID 5 MG/100ML SOLUTION: Performed by: INTERNAL MEDICINE

## 2020-02-03 PROCEDURE — 96374 THER/PROPH/DIAG INJ IV PUSH: CPT

## 2020-02-03 RX ORDER — ZOLEDRONIC ACID 5 MG/100ML
5 INJECTION, SOLUTION INTRAVENOUS ONCE
Status: CANCELLED | OUTPATIENT
Start: 2021-02-02

## 2020-02-03 RX ORDER — ZOLEDRONIC ACID 5 MG/100ML
5 INJECTION, SOLUTION INTRAVENOUS ONCE
Status: COMPLETED | OUTPATIENT
Start: 2020-02-03 | End: 2020-02-03

## 2020-02-03 RX ORDER — SODIUM CHLORIDE 9 MG/ML
250 INJECTION, SOLUTION INTRAVENOUS ONCE
Status: CANCELLED | OUTPATIENT
Start: 2021-02-02

## 2020-02-03 RX ADMIN — ZOLEDRONIC ACID 5 MG: 5 INJECTION, SOLUTION INTRAVENOUS at 14:49

## 2020-02-03 NOTE — PROGRESS NOTES
Creatinine clearance 75.06 ccs/min per cockcroft gault calculator. Pt tolerated infusion well.  Pt dc'ed ambulatory with AVS.

## 2020-04-29 ENCOUNTER — TELEMEDICINE (OUTPATIENT)
Dept: INTERNAL MEDICINE | Facility: CLINIC | Age: 65
End: 2020-04-29

## 2020-04-29 DIAGNOSIS — F41.9 ANXIETY: Primary | ICD-10-CM

## 2020-04-29 PROCEDURE — 99214 OFFICE O/P EST MOD 30 MIN: CPT | Performed by: INTERNAL MEDICINE

## 2020-04-29 RX ORDER — ESCITALOPRAM OXALATE 10 MG/1
10 TABLET ORAL DAILY
Qty: 90 TABLET | Refills: 3 | Status: SHIPPED | OUTPATIENT
Start: 2020-04-29 | End: 2020-10-14

## 2020-04-29 RX ORDER — LORAZEPAM 0.5 MG/1
0.5 TABLET ORAL EVERY 8 HOURS PRN
Qty: 30 TABLET | Refills: 3 | Status: SHIPPED | OUTPATIENT
Start: 2020-04-29 | End: 2020-10-06

## 2020-04-29 NOTE — PROGRESS NOTES
Subjective     Becca Alba is a 64 y.o. female who presents with   Chief Complaint   Patient presents with   • Anxiety       History of Present Illness     You have chosen to receive care through a telehealth visit.  Do you consent to use a video/audio connection for your medical care today? Yes.  Length of video is 27 minutes.      C/o anxiety.  Mainly situational with COVID pandemic.  Dealing with young adult children, elderly parent and her work as a .  Prn ativan is helpful.  Moderate severity of constant symptoms for the past six weeks.      Review of Systems   Respiratory: Negative.    Cardiovascular: Negative.        The following portions of the patient's history were reviewed and updated as appropriate: allergies, current medications and problem list.    Patient Active Problem List    Diagnosis Date Noted   • Other insomnia 11/25/2019   • Hyperlipidemia 04/25/2016     Note Last Updated: 11/25/2019     2.9% ASCVD risk on 11/25/2019     • Leukopenia 04/25/2016     Note Last Updated: 4/25/2016     Description: chronic s/p CBC evaluation.     • Osteoporosis 04/25/2016     Note Last Updated: 11/21/2018     Fosamax caused stomach upset.  Now started reclast in 12/2016     • Uncomplicated asthma 04/25/2016       Current Outpatient Medications on File Prior to Visit   Medication Sig Dispense Refill   • beclomethasone (QVAR) 40 MCG/ACT inhaler Inhale 2 puffs 2 (two) times a day. 1 inhaler 11   • diclofenac (VOLTAREN) 1 % gel gel Apply 2 g topically to the appropriate area as directed 4 (Four) Times a Day. 100 g 0   • LORazepam (ATIVAN) 0.5 MG tablet TAKE ONE TABLET BY MOUTH EVERY 8 HOURS AS NEEDED FOR ANXIETY 20 tablet 3   • meloxicam (MOBIC) 15 MG tablet TAKE ONE TABLET BY MOUTH DAILY 90 tablet 2   • Multiple Vitamin (MULTI VITAMIN DAILY PO) Take  by mouth.     • valACYclovir (VALTREX) 500 MG tablet TAKE ONE TABLET BY MOUTH TWICE A DAY 30 tablet 0     No current facility-administered  medications on file prior to visit.        Objective     There were no vitals taken for this visit.    Physical Exam   Constitutional: She is oriented to person, place, and time. She appears well-developed and well-nourished.   HENT:   Head: Normocephalic and atraumatic.   Pulmonary/Chest: Effort normal.   Neurological: She is alert and oriented to person, place, and time.   Psychiatric: She has a normal mood and affect. Her behavior is normal.       Assessment/Plan   Becca was seen today for anxiety.    Diagnoses and all orders for this visit:    Anxiety        Discussion    Patient is seen today for video visit with anxiety.  Trial of Lexapro.  Discussed with the patient onset on action and the potential side effects including nausea.  The patient will let me know of any side effects from the medication.    Continue prn ativan as a breakthrough.           Future Appointments   Date Time Provider Department Center   11/25/2020  8:30 AM [facility] None   11/30/2020  3:30 PM Shefali Back MD [facility] None

## 2020-09-14 ENCOUNTER — FLU SHOT (OUTPATIENT)
Dept: INTERNAL MEDICINE | Facility: CLINIC | Age: 65
End: 2020-09-14

## 2020-09-14 DIAGNOSIS — Z23 NEED FOR VACCINATION: Primary | ICD-10-CM

## 2020-09-14 PROCEDURE — 90686 IIV4 VACC NO PRSV 0.5 ML IM: CPT | Performed by: INTERNAL MEDICINE

## 2020-09-14 PROCEDURE — 90471 IMMUNIZATION ADMIN: CPT | Performed by: INTERNAL MEDICINE

## 2020-10-05 DIAGNOSIS — F41.9 ANXIETY: ICD-10-CM

## 2020-10-06 RX ORDER — LORAZEPAM 0.5 MG/1
TABLET ORAL
Qty: 30 TABLET | Refills: 0 | Status: SHIPPED | OUTPATIENT
Start: 2020-10-06 | End: 2020-11-04

## 2020-10-14 ENCOUNTER — OFFICE VISIT (OUTPATIENT)
Dept: INTERNAL MEDICINE | Facility: CLINIC | Age: 65
End: 2020-10-14

## 2020-10-14 VITALS
DIASTOLIC BLOOD PRESSURE: 62 MMHG | HEART RATE: 80 BPM | WEIGHT: 130 LBS | BODY MASS INDEX: 23.03 KG/M2 | SYSTOLIC BLOOD PRESSURE: 118 MMHG
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
## 2020-10-14 DIAGNOSIS — R07.81 RIB PAIN ON LEFT SIDE: ICD-10-CM

## 2020-10-14 DIAGNOSIS — M25.559 HIP PAIN: ICD-10-CM

## 2020-10-14 PROCEDURE — 71101 X-RAY EXAM UNILAT RIBS/CHEST: CPT | Performed by: INTERNAL MEDICINE

## 2020-10-14 PROCEDURE — 73502 X-RAY EXAM HIP UNI 2-3 VIEWS: CPT | Performed by: INTERNAL MEDICINE

## 2020-10-14 PROCEDURE — 99213 OFFICE O/P EST LOW 20 MIN: CPT | Performed by: INTERNAL MEDICINE
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
## 2020-10-30 ENCOUNTER — TELEPHONE (OUTPATIENT)
Dept: INTERNAL MEDICINE | Facility: CLINIC | Age: 65
End: 2020-10-30

## 2020-10-30 NOTE — TELEPHONE ENCOUNTER
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 Needs a  letter from MD stating she is at higher risk. Patient states it needs to say higher risk instead of high risk.    Needs to state:   - at higher risk for complications with Covid and so is someone she lives with (her daughter who lives with her)    12:15-2:30 (unable to answer phone due to being in class)    Patient can be reached at [phone].

## 2020-11-04 DIAGNOSIS — F41.9 ANXIETY: ICD-10-CM

## 2020-11-04 RX ORDER — LORAZEPAM 0.5 MG/1
TABLET ORAL
Qty: 30 TABLET | Refills: 0 | Status: SHIPPED | OUTPATIENT
Start: 2020-11-04 | End: 2020-12-06

## 2020-11-19 DIAGNOSIS — Z00.00 WELL ADULT EXAM: Primary | ICD-10-CM

## 2020-11-25 LAB
ALBUMIN SERPL-MCNC: 4.4 G/DL (ref 3.5–5.2)
ALBUMIN/GLOB SERPL: 2 G/DL
ALP SERPL-CCNC: 54 U/L (ref 39–117)
ALT SERPL-CCNC: 11 U/L (ref 1–33)
APPEARANCE UR: CLEAR
AST SERPL-CCNC: 18 U/L (ref 1–32)
BACTERIA #/AREA URNS HPF: ABNORMAL /HPF
BASOPHILS # BLD AUTO: 0.04 10*3/MM3 (ref 0–0.2)
BASOPHILS NFR BLD AUTO: 1.2 % (ref 0–1.5)
BILIRUB SERPL-MCNC: 0.5 MG/DL (ref 0–1.2)
BILIRUB UR QL STRIP: NEGATIVE
BUN SERPL-MCNC: 19 MG/DL (ref 8–23)
BUN/CREAT SERPL: 22.9 (ref 7–25)
CALCIUM SERPL-MCNC: 9.2 MG/DL (ref 8.6–10.5)
CASTS URNS MICRO: ABNORMAL
CHLORIDE SERPL-SCNC: 102 MMOL/L (ref 98–107)
CHOLEST SERPL-MCNC: 216 MG/DL (ref 0–200)
CO2 SERPL-SCNC: 30 MMOL/L (ref 22–29)
COLOR UR: ABNORMAL
CREAT SERPL-MCNC: 0.83 MG/DL (ref 0.57–1)
EOSINOPHIL # BLD AUTO: 0.08 10*3/MM3 (ref 0–0.4)
EOSINOPHIL NFR BLD AUTO: 2.4 % (ref 0.3–6.2)
EPI CELLS #/AREA URNS HPF: ABNORMAL /HPF
ERYTHROCYTE [DISTWIDTH] IN BLOOD BY AUTOMATED COUNT: 12.9 % (ref 12.3–15.4)
GLOBULIN SER CALC-MCNC: 2.2 GM/DL
GLUCOSE SERPL-MCNC: 93 MG/DL (ref 65–99)
GLUCOSE UR QL: NEGATIVE
HCT VFR BLD AUTO: 45.3 % (ref 34–46.6)
HDLC SERPL-MCNC: 68 MG/DL (ref 40–60)
HGB BLD-MCNC: 14.6 G/DL (ref 12–15.9)
HGB UR QL STRIP: NEGATIVE
IMM GRANULOCYTES # BLD AUTO: 0.01 10*3/MM3 (ref 0–0.05)
IMM GRANULOCYTES NFR BLD AUTO: 0.3 % (ref 0–0.5)
KETONES UR QL STRIP: NEGATIVE
LDLC SERPL CALC-MCNC: 134 MG/DL (ref 0–100)
LDLC/HDLC SERPL: 1.94 {RATIO}
LEUKOCYTE ESTERASE UR QL STRIP: ABNORMAL
LYMPHOCYTES # BLD AUTO: 1.31 10*3/MM3 (ref 0.7–3.1)
LYMPHOCYTES NFR BLD AUTO: 39.9 % (ref 19.6–45.3)
MCH RBC QN AUTO: 28.6 PG (ref 26.6–33)
MCHC RBC AUTO-ENTMCNC: 32.2 G/DL (ref 31.5–35.7)
MCV RBC AUTO: 88.8 FL (ref 79–97)
MONOCYTES # BLD AUTO: 0.22 10*3/MM3 (ref 0.1–0.9)
MONOCYTES NFR BLD AUTO: 6.7 % (ref 5–12)
NEUTROPHILS # BLD AUTO: 1.62 10*3/MM3 (ref 1.7–7)
NEUTROPHILS NFR BLD AUTO: 49.5 % (ref 42.7–76)
NITRITE UR QL STRIP: NEGATIVE
NRBC BLD AUTO-RTO: 0 /100 WBC (ref 0–0.2)
PH UR STRIP: 5.5 [PH] (ref 5–8)
PLATELET # BLD AUTO: 201 10*3/MM3 (ref 140–450)
POTASSIUM SERPL-SCNC: 4.4 MMOL/L (ref 3.5–5.2)
PROT SERPL-MCNC: 6.6 G/DL (ref 6–8.5)
PROT UR QL STRIP: NEGATIVE
RBC # BLD AUTO: 5.1 10*6/MM3 (ref 3.77–5.28)
RBC #/AREA URNS HPF: ABNORMAL /HPF
SODIUM SERPL-SCNC: 141 MMOL/L (ref 136–145)
SP GR UR: 1.02 (ref 1–1.03)
TRIGL SERPL-MCNC: 82 MG/DL (ref 0–150)
TSH SERPL DL<=0.005 MIU/L-ACNC: 1.74 UIU/ML (ref 0.27–4.2)
UROBILINOGEN UR STRIP-MCNC: ABNORMAL MG/DL
VLDLC SERPL CALC-MCNC: 14 MG/DL (ref 5–40)
WBC # BLD AUTO: 3.28 10*3/MM3 (ref 3.4–10.8)
WBC #/AREA URNS HPF: ABNORMAL /HPF

## 2020-11-30 ENCOUNTER — OFFICE VISIT (OUTPATIENT)
Dept: INTERNAL MEDICINE | Facility: CLINIC | Age: 65
End: 2020-11-30

## 2020-11-30 VITALS
WEIGHT: 128 LBS | BODY MASS INDEX: 22.68 KG/M2 | HEART RATE: 81 BPM | DIASTOLIC BLOOD PRESSURE: 62 MMHG | SYSTOLIC BLOOD PRESSURE: 100 MMHG | OXYGEN SATURATION: 98 % | HEIGHT: 63 IN

## 2020-11-30 DIAGNOSIS — F41.9 ANXIETY: ICD-10-CM

## 2020-11-30 DIAGNOSIS — M81.0 OSTEOPOROSIS, UNSPECIFIED OSTEOPOROSIS TYPE, UNSPECIFIED PATHOLOGICAL FRACTURE PRESENCE: ICD-10-CM

## 2020-11-30 DIAGNOSIS — R82.90 ABNORMAL URINE: ICD-10-CM

## 2020-11-30 DIAGNOSIS — E78.5 HYPERLIPIDEMIA, UNSPECIFIED HYPERLIPIDEMIA TYPE: ICD-10-CM

## 2020-11-30 DIAGNOSIS — Z00.00 WELL ADULT EXAM: Primary | ICD-10-CM

## 2020-11-30 PROCEDURE — 99213 OFFICE O/P EST LOW 20 MIN: CPT | Performed by: INTERNAL MEDICINE

## 2020-11-30 RX ORDER — VALACYCLOVIR HYDROCHLORIDE 500 MG/1
500 TABLET, FILM COATED ORAL 2 TIMES DAILY
Qty: 30 TABLET | Refills: 5 | Status: SHIPPED | OUTPATIENT
Start: 2020-11-30

## 2020-11-30 NOTE — PROGRESS NOTES
Subjective     Becca Alba is a 64 y.o. female who presents for a complete physical exam.      History of Present Illness     OP.  She is maintained on Reclast.    HLD.  Mild increase with fair ratios.   Anxiety.  She takes lorazepam for sleeping.      Review of Systems   Constitutional: Negative.    HENT: Negative.    Eyes: Negative.    Respiratory: Negative.    Cardiovascular: Negative.    Gastrointestinal: Negative.    Endocrine: Negative.    Genitourinary: Negative.    Musculoskeletal: Negative.    Skin: Negative.    Allergic/Immunologic: Negative.    Neurological: Negative.    Hematological: Negative.    Psychiatric/Behavioral: Negative.        The following portions of the patient's history were reviewed and updated as appropriate: allergies, current medications, past family history, past medical history, past social history, past surgical history and problem list.  Health maintenance tab was reviewed and updated with the patient.       Patient Active Problem List    Diagnosis Date Noted   • Other insomnia 11/25/2019   • Hyperlipidemia 04/25/2016     Note Last Updated: 11/25/2019     2.9% ASCVD risk on 11/25/2019     • Leukopenia 04/25/2016     Note Last Updated: 4/25/2016     Description: chronic s/p CBC evaluation.     • Osteoporosis 04/25/2016     Note Last Updated: 11/21/2018     Fosamax caused stomach upset.  Now started reclast in 12/2016     • Uncomplicated asthma 04/25/2016       Past Medical History:   Diagnosis Date   • Allergic rhinitis    • Atypical chest pain    • Chronic leukopenia    • Depression    • H/O bone density study 12/21/2015   • H/O mammogram 12/29/2015   • History of EKG 11/09/2015   • History of fever    • History of herpes genitalis    • Hyperlipidemia    • IBS (irritable bowel syndrome)    • Osteopenia    • Osteoporosis    • Rib pain on left side    • Rib pain on left side    • Tinnitus        Past Surgical History:   Procedure Laterality Date   • BLADDER SURGERY     •  COLONOSCOPY  07/20/2012   • NEUROMA SURGERY  12/18/2014       Family History   Problem Relation Age of Onset   • Diabetes type II Mother    • Osteoarthritis Mother    • Macular degeneration Father    • Myelodysplastic syndrome Father    • Heart disease Maternal Grandmother    • Lung cancer Maternal Grandmother    • Colon cancer Maternal Grandmother    • Celiac disease Daughter    • Heart disease Maternal Grandfather        Social History     Socioeconomic History   • Marital status:      Spouse name: Not on file   • Number of children: Not on file   • Years of education: Not on file   • Highest education level: Not on file   Tobacco Use   • Smoking status: Former Smoker     Packs/day: 0.00     Years: 0.00     Pack years: 0.00   • Smokeless tobacco: Never Used   • Tobacco comment: quit in early 20's   Substance and Sexual Activity   • Alcohol use: Yes     Alcohol/week: 1.0 standard drinks     Types: 1 Glasses of wine per week     Comment: Social   • Drug use: No   • Sexual activity: Not Currently     Partners: Male     Birth control/protection: Abstinence       Current Outpatient Medications on File Prior to Visit   Medication Sig Dispense Refill   • beclomethasone (QVAR) 40 MCG/ACT inhaler Inhale 2 puffs 2 (two) times a day. 1 inhaler 11   • diclofenac (VOLTAREN) 1 % gel gel Apply 2 g topically to the appropriate area as directed 4 (Four) Times a Day. 100 g 0   • LORazepam (ATIVAN) 0.5 MG tablet TAKE ONE TABLET BY MOUTH EVERY 8 HOURS AS NEEDED FOR ANXIETY 30 tablet 0   • meloxicam (MOBIC) 15 MG tablet TAKE ONE TABLET BY MOUTH DAILY 90 tablet 2   • Multiple Vitamin (MULTI VITAMIN DAILY PO) Take  by mouth.     • [DISCONTINUED] valACYclovir (VALTREX) 500 MG tablet TAKE ONE TABLET BY MOUTH TWICE A DAY 30 tablet 0     No current facility-administered medications on file prior to visit.        Allergies   Allergen Reactions   • Codeine    • Hydrocodone    • Nitrofurantoin Nausea Only   • Amoxicillin-Pot Clavulanate  "GI Intolerance   • Pneumovax [Pneumococcal Polysaccharide Vaccine] Rash       Immunization History   Administered Date(s) Administered   • Flu Vaccine Quad PF >18YRS 11/14/2016, 11/20/2017   • Flulaval/Fluarix/Fluzone Quad 09/14/2020   • Hepatitis A 05/04/2018, 11/21/2018   • Influenza Quad Vaccine (Inpatient) 11/20/2017   • Influenza TIV (IM) 01/01/2014, 11/09/2015   • Influenza, Unspecified 11/21/2018, 10/18/2019   • Pneumococcal Polysaccharide (PPSV23) 11/20/2017   • Shingrix 03/25/2019, 09/01/2019, 09/01/2019, 10/01/2019   • Tdap 07/22/2008, 12/27/2018   • flucelvax quad pfs =>4 YRS 11/21/2018, 10/18/2019       Objective     /62   Pulse 81   Ht 160 cm (62.99\")   Wt 58.1 kg (128 lb)   SpO2 98%   BMI 22.68 kg/m²     Physical Exam  Constitutional:       Appearance: She is well-developed.   HENT:      Head: Normocephalic and atraumatic.      Right Ear: Hearing, tympanic membrane and external ear normal.      Left Ear: Hearing, tympanic membrane and external ear normal.      Nose: Nose normal.   Neck:      Musculoskeletal: Neck supple.      Thyroid: No thyromegaly.   Cardiovascular:      Rate and Rhythm: Normal rate and regular rhythm.      Heart sounds: Normal heart sounds. No murmur.   Pulmonary:      Effort: Pulmonary effort is normal.      Breath sounds: Normal breath sounds.   Chest:      Breasts:         Right: No mass.         Left: No mass.   Abdominal:      General: There is no distension.      Palpations: Abdomen is soft.      Tenderness: There is no abdominal tenderness.   Lymphadenopathy:      Cervical: No cervical adenopathy.   Skin:     General: Skin is warm and dry.   Neurological:      Mental Status: She is alert and oriented to person, place, and time.   Psychiatric:         Speech: Speech normal.         Behavior: Behavior normal.         Thought Content: Thought content normal.         Judgment: Judgment normal.         Assessment/Plan   Diagnoses and all orders for this visit:    1. " Well adult exam (Primary)    2. Abnormal urine  -     UA / M With / Rflx Culture([facility] ONLY) - Urine, Clean Catch    3. Anxiety    4. Osteoporosis, unspecified osteoporosis type, unspecified pathological fracture presence    5. Hyperlipidemia, unspecified hyperlipidemia type    Other orders  -     valACYclovir (VALTREX) 500 MG tablet; Take 1 tablet by mouth 2 (Two) Times a Day.  Dispense: 30 tablet; Refill: 5        Discussion    Patient presents today for a CPE.      Patient follows a healthy diet.   Patient follows an adequate exercise regimen. Patient will schedule a mammogram. Colon cancer screening is up to date.   Pap smear performed every 3 years.    HLD.  Continue healthy diet.   OP.  I recommend to get 1200 mg of calcium and 1000 IUs of vitamin D through diet and supplements and to participate in a weight based exercise to prevent loss of bone mineral density. Bone mineral will be monitored every two years.  Continue Reclast. Anxiety.  Continue lorazepam primarily for sleep.    Update contract.    Abnormal urine.  Recheck today.        Health Maintenance   Topic Date Due   • DXA SCAN  12/22/2019   • ANNUAL PHYSICAL  11/26/2020   • MAMMOGRAM  04/03/2021   • PAP SMEAR  11/21/2021   • LIPID PANEL  11/25/2021   • COLONOSCOPY  01/15/2028   • TDAP/TD VACCINES (3 - Td) 12/27/2028   • HEPATITIS C SCREENING  Completed   • Pneumococcal Vaccine 0-64  Completed   • INFLUENZA VACCINE  Completed   • ZOSTER VACCINE  Completed            No future appointments.

## 2020-12-01 LAB
APPEARANCE UR: CLEAR
BACTERIA #/AREA URNS HPF: NORMAL /HPF
BILIRUB UR QL STRIP: NEGATIVE
COLOR UR: YELLOW
EPI CELLS #/AREA URNS HPF: NORMAL /HPF (ref 0–10)
GLUCOSE UR QL: NEGATIVE
HGB UR QL STRIP: NEGATIVE
KETONES UR QL STRIP: NEGATIVE
LEUKOCYTE ESTERASE UR QL STRIP: NEGATIVE
MICRO URNS: NORMAL
MICRO URNS: NORMAL
MUCOUS THREADS URNS QL MICRO: PRESENT /HPF
NITRITE UR QL STRIP: NEGATIVE
PH UR STRIP: 5 [PH] (ref 5–7.5)
PROT UR QL STRIP: NEGATIVE
RBC #/AREA URNS HPF: NORMAL /HPF (ref 0–2)
SP GR UR: 1.02 (ref 1–1.03)
URINALYSIS REFLEX: NORMAL
UROBILINOGEN UR STRIP-MCNC: 0.2 MG/DL (ref 0.2–1)
WBC #/AREA URNS HPF: NORMAL /HPF (ref 0–5)

## 2020-12-04 DIAGNOSIS — F41.9 ANXIETY: ICD-10-CM

## 2020-12-06 RX ORDER — LORAZEPAM 0.5 MG/1
TABLET ORAL
Qty: 30 TABLET | Refills: 0 | Status: SHIPPED | OUTPATIENT
Start: 2020-12-06 | End: 2021-01-22

## 2020-12-17 ENCOUNTER — APPOINTMENT (OUTPATIENT)
Dept: WOMENS IMAGING | Facility: HOSPITAL | Age: 65
End: 2020-12-17

## 2020-12-17 PROCEDURE — 77063 BREAST TOMOSYNTHESIS BI: CPT | Performed by: RADIOLOGY

## 2020-12-17 PROCEDURE — 77067 SCR MAMMO BI INCL CAD: CPT | Performed by: RADIOLOGY

## 2020-12-22 ENCOUNTER — TELEPHONE (OUTPATIENT)
Dept: INTERNAL MEDICINE | Facility: CLINIC | Age: 65
End: 2020-12-22

## 2020-12-22 DIAGNOSIS — R92.8 ABNORMAL MAMMOGRAM OF RIGHT BREAST: Primary | ICD-10-CM

## 2020-12-22 NOTE — TELEPHONE ENCOUNTER
PATIENT WANTS A CALL BACK FROM  ABOUT RADIOLOGY REPORT FROM [email or web address].      CALL BACK PHONE NUMBER:[phone]

## 2021-01-13 ENCOUNTER — TELEPHONE (OUTPATIENT)
Dept: INTERNAL MEDICINE | Facility: CLINIC | Age: 66
End: 2021-01-13

## 2021-01-13 NOTE — TELEPHONE ENCOUNTER
Caller: Becca Alba    Relationship to patient: Self    Best call back number: [phone]    Patient is needing: PT IS WANTING TO DISCUSS THE RISK FACTOR OF TAKING THE COVID VACCINE. PT HAD A REACTION TO THE PNEUMONIA AND WANTS TO KNOW THE RISK FOR REACTION FROM THE COVID VACCINE. 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

## 2021-01-18 ENCOUNTER — TELEPHONE (OUTPATIENT)
Dept: INTERNAL MEDICINE | Facility: CLINIC | Age: 66
End: 2021-01-18

## 2021-01-18 DIAGNOSIS — M81.0 OSTEOPOROSIS, UNSPECIFIED OSTEOPOROSIS TYPE, UNSPECIFIED PATHOLOGICAL FRACTURE PRESENCE: Primary | ICD-10-CM

## 2021-01-18 RX ORDER — SODIUM CHLORIDE 9 MG/ML
250 INJECTION, SOLUTION INTRAVENOUS ONCE
Status: CANCELLED | OUTPATIENT
Start: 2021-02-02

## 2021-01-18 NOTE — TELEPHONE ENCOUNTER
----- Message from Shefali Back MD sent at 1/18/2021 10:35 AM EST -----  Advise paitent reclast orders are placed.    ----- Message -----  From: Shefali Back MD  Sent: 1/18/2021  To: Shefali Back MD    Due reclast

## 2021-01-22 DIAGNOSIS — F41.9 ANXIETY: ICD-10-CM

## 2021-01-22 RX ORDER — LORAZEPAM 0.5 MG/1
TABLET ORAL
Qty: 30 TABLET | Refills: 0 | Status: SHIPPED | OUTPATIENT
Start: 2021-01-22 | End: 2021-03-01

## 2021-01-25 ENCOUNTER — TELEPHONE (OUTPATIENT)
Dept: INTERNAL MEDICINE | Facility: CLINIC | Age: 66
End: 2021-01-25

## 2021-01-25 ENCOUNTER — APPOINTMENT (OUTPATIENT)
Dept: WOMENS IMAGING | Facility: HOSPITAL | Age: 66
End: 2021-01-25

## 2021-01-25 DIAGNOSIS — M81.0 OSTEOPOROSIS, UNSPECIFIED OSTEOPOROSIS TYPE, UNSPECIFIED PATHOLOGICAL FRACTURE PRESENCE: Primary | ICD-10-CM

## 2021-01-25 PROCEDURE — 76641 ULTRASOUND BREAST COMPLETE: CPT | Performed by: RADIOLOGY

## 2021-01-25 PROCEDURE — 77065 DX MAMMO INCL CAD UNI: CPT | Performed by: RADIOLOGY

## 2021-01-25 PROCEDURE — G0279 TOMOSYNTHESIS, MAMMO: HCPCS | Performed by: RADIOLOGY

## 2021-01-25 PROCEDURE — 77061 BREAST TOMOSYNTHESIS UNI: CPT | Performed by: RADIOLOGY

## 2021-01-27 DIAGNOSIS — N63.10 BREAST MASS, RIGHT: Primary | ICD-10-CM

## 2021-01-28 ENCOUNTER — TELEPHONE (OUTPATIENT)
Dept: INTERNAL MEDICINE | Facility: CLINIC | Age: 66
End: 2021-01-28

## 2021-01-28 DIAGNOSIS — N63.10 BREAST MASS, RIGHT: Primary | ICD-10-CM

## 2021-01-28 NOTE — TELEPHONE ENCOUNTER
Caller: Lisa Alba    Relationship: Self    Best call back number: [phone] ( PLEASE CALL LISA     What orders are you requesting (i.e. lab or imaging): ULTRASOUND GUIDED   BIOPSY      In what timeframe would the patient need to come in: APPOINTMENTS     AVAILABLE NEXT WEEK @ [facility]    Where will you receive your lab/imaging services: [facility]       Additional notes:   [facility] ( F)[phone] NITO [place]OSCO

## 2021-01-28 NOTE — TELEPHONE ENCOUNTER
Caller: Becca Alba    Relationship to patient: Self    Best call back number:     Patient is needing: PATIENT IS CALLING IN REGARDS TO A REFERRAL FOR A MAMMOGRAM THAT WAS PLACED IN FOR HER BY MD DUFF. THE PLACE THAT SHE WAS REFERRED TO IS NOT ABLE TO GET HER IN UNTIL 02/15/2021 BUT SHE WOULD LIKE TO BE SEEN SOONER AND IS WONDERING WHERE ELSE SHE CAN BE REFERRED TO. PLEASE CONTACT PATIENT WHEN POSSIBLE SINCE SHE WOULD NOT LIKE FOR SOMEONE ELSE TO SCHEDULE HER AN APPT, SHE WOULD LIKE TO SCHEDULE IT HERSELF.     PLEASE ADVISE

## 2021-01-29 LAB
BUN SERPL-MCNC: 20 MG/DL (ref 8–23)
BUN/CREAT SERPL: 30.3 (ref 7–25)
CALCIUM SERPL-MCNC: 9.4 MG/DL (ref 8.6–10.5)
CHLORIDE SERPL-SCNC: 102 MMOL/L (ref 98–107)
CO2 SERPL-SCNC: 29.8 MMOL/L (ref 22–29)
CREAT SERPL-MCNC: 0.66 MG/DL (ref 0.57–1)
GLUCOSE SERPL-MCNC: 87 MG/DL (ref 65–99)
POTASSIUM SERPL-SCNC: 4 MMOL/L (ref 3.5–5.2)
SODIUM SERPL-SCNC: 141 MMOL/L (ref 136–145)

## 2021-02-03 RX ORDER — SODIUM CHLORIDE 9 MG/ML
250 INJECTION, SOLUTION INTRAVENOUS ONCE
OUTPATIENT
Start: 2021-02-05

## 2021-02-05 ENCOUNTER — HOSPITAL ENCOUNTER (OUTPATIENT)
Dept: INFUSION THERAPY | Facility: HOSPITAL | Age: 66
Discharge: HOME | End: 2021-02-05
Admitting: INTERNAL MEDICINE

## 2021-02-05 VITALS
RESPIRATION RATE: 20 BRPM | BODY MASS INDEX: 22.5 KG/M2 | HEART RATE: 65 BPM | TEMPERATURE: 96.9 F | SYSTOLIC BLOOD PRESSURE: 117 MMHG | OXYGEN SATURATION: 99 % | DIASTOLIC BLOOD PRESSURE: 56 MMHG | WEIGHT: 127 LBS

## 2021-02-05 DIAGNOSIS — M81.0 OSTEOPOROSIS, UNSPECIFIED OSTEOPOROSIS TYPE, UNSPECIFIED PATHOLOGICAL FRACTURE PRESENCE: Primary | ICD-10-CM

## 2021-02-05 PROCEDURE — 25010000002 ZOLEDRONIC ACID 5 MG/100ML SOLUTION: Performed by: INTERNAL MEDICINE

## 2021-02-05 PROCEDURE — 96365 THER/PROPH/DIAG IV INF INIT: CPT

## 2021-02-05 RX ORDER — ZOLEDRONIC ACID 5 MG/100ML
5 INJECTION, SOLUTION INTRAVENOUS ONCE
OUTPATIENT
Start: 2022-02-05

## 2021-02-05 RX ORDER — ZOLEDRONIC ACID 5 MG/100ML
5 INJECTION, SOLUTION INTRAVENOUS ONCE
Status: COMPLETED | OUTPATIENT
Start: 2021-02-05 | End: 2021-02-05

## 2021-02-05 RX ORDER — SODIUM CHLORIDE 9 MG/ML
250 INJECTION, SOLUTION INTRAVENOUS ONCE
OUTPATIENT
Start: 2022-02-05

## 2021-02-05 RX ADMIN — ZOLEDRONIC ACID 5 MG: 0.05 INJECTION, SOLUTION INTRAVENOUS at 15:42

## 2021-02-05 NOTE — PATIENT INSTRUCTIONS
Zoledronic Acid injection (Paget's Disease, Osteoporosis)  What is this medicine?  ZOLEDRONIC ACID (ANNY le lupen ik AS id) lowers the amount of calcium loss from bone. It is used to treat Paget's disease and osteoporosis in women.  This medicine may be used for other purposes; ask your health care provider or pharmacist if you have questions.  COMMON BRAND NAME(S): Reclast, Zometa  What should I tell my health care provider before I take this medicine?  They need to know if you have any of these conditions:  · aspirin-sensitive asthma  · cancer, especially if you are receiving medicines used to treat cancer  · dental disease or wear dentures  · infection  · kidney disease  · low levels of calcium in the blood  · past surgery on the parathyroid gland or intestines  · receiving corticosteroids like dexamethasone or prednisone  · an unusual or allergic reaction to zoledronic acid, other medicines, foods, dyes, or preservatives  · pregnant or trying to get pregnant  · breast-feeding  How should I use this medicine?  This medicine is for infusion into a vein. It is given by a health care professional in a hospital or clinic setting.  Talk to your pediatrician regarding the use of this medicine in children. This medicine is not approved for use in children.  Overdosage: If you think you have taken too much of this medicine contact a poison control center or emergency room at once.  NOTE: This medicine is only for you. Do not share this medicine with others.  What if I miss a dose?  It is important not to miss your dose. Call your doctor or health care professional if you are unable to keep an appointment.  What may interact with this medicine?  · certain antibiotics given by injection  · NSAIDs, medicines for pain and inflammation, like ibuprofen or naproxen  · some diuretics like bumetanide, furosemide  · teriparatide  This list may not describe all possible interactions. Give your health care provider a list of all the  medicines, herbs, non-prescription drugs, or dietary supplements you use. Also tell them if you smoke, drink alcohol, or use illegal drugs. Some items may interact with your medicine.  What should I watch for while using this medicine?  Visit your doctor or health care professional for regular checkups. It may be some time before you see the benefit from this medicine. Do not stop taking your medicine unless your doctor tells you to. Your doctor may order blood tests or other tests to see how you are doing.  Women should inform their doctor if they wish to become pregnant or think they might be pregnant. There is a potential for serious side effects to an unborn child. Talk to your health care professional or pharmacist for more information.  You should make sure that you get enough calcium and vitamin D while you are taking this medicine. Discuss the foods you eat and the vitamins you take with your health care professional.  Some people who take this medicine have severe bone, joint, and/or muscle pain. This medicine may also increase your risk for jaw problems or a broken thigh bone. Tell your doctor right away if you have severe pain in your jaw, bones, joints, or muscles. Tell your doctor if you have any pain that does not go away or that gets worse.  Tell your dentist and dental surgeon that you are taking this medicine. You should not have major dental surgery while on this medicine. See your dentist to have a dental exam and fix any dental problems before starting this medicine. Take good care of your teeth while on this medicine. Make sure you see your dentist for regular follow-up appointments.  What side effects may I notice from receiving this medicine?  Side effects that you should report to your doctor or health care professional as soon as possible:  · allergic reactions like skin rash, itching or hives, swelling of the face, lips, or tongue  · anxiety, confusion, or depression  · breathing  problems  · changes in vision  · eye pain  · feeling faint or lightheaded, falls  · jaw pain, especially after dental work  · mouth sores  · muscle cramps, stiffness, or weakness  · redness, blistering, peeling or loosening of the skin, including inside the mouth  · trouble passing urine or change in the amount of urine  Side effects that usually do not require medical attention (report to your doctor or health care professional if they continue or are bothersome):  · bone, joint, or muscle pain  · constipation  · diarrhea  · fever  · hair loss  · irritation at site where injected  · loss of appetite  · nausea, vomiting  · stomach upset  · trouble sleeping  · trouble swallowing  · weak or tired  This list may not describe all possible side effects. Call your doctor for medical advice about side effects. You may report side effects to FDA at 8-943-FDA-2665.  Where should I keep my medicine?  This drug is given in a hospital or clinic and will not be stored at home.  NOTE: This sheet is a summary. It may not cover all possible information. If you have questions about this medicine, talk to your doctor, pharmacist, or health care provider.  © 2020 [employer]vier/Gold Standard (2015-05-16 14:19:57)

## 2021-02-08 DIAGNOSIS — C50.919 MALIGNANT NEOPLASM OF FEMALE BREAST, UNSPECIFIED ESTROGEN RECEPTOR STATUS, UNSPECIFIED LATERALITY, UNSPECIFIED SITE OF BREAST (HCC): Primary | ICD-10-CM

## 2021-02-11 ENCOUNTER — HOSPITAL ENCOUNTER (OUTPATIENT)
Dept: ULTRASOUND IMAGING | Facility: HOSPITAL | Age: 66
End: 2021-02-11

## 2021-02-27 DIAGNOSIS — F41.9 ANXIETY: ICD-10-CM

## 2021-03-01 RX ORDER — LORAZEPAM 0.5 MG/1
TABLET ORAL
Qty: 30 TABLET | Refills: 0 | Status: SHIPPED | OUTPATIENT
Start: 2021-03-01 | End: 2021-04-09

## 2021-03-16 ENCOUNTER — BULK ORDERING (OUTPATIENT)
Dept: CASE MANAGEMENT | Facility: OTHER | Age: 66
End: 2021-03-16

## 2021-03-16 DIAGNOSIS — Z23 IMMUNIZATION DUE: ICD-10-CM

## 2021-04-09 DIAGNOSIS — F41.9 ANXIETY: ICD-10-CM

## 2021-04-09 RX ORDER — LORAZEPAM 0.5 MG/1
TABLET ORAL
Qty: 30 TABLET | Refills: 0 | Status: SHIPPED | OUTPATIENT
Start: 2021-04-09 | End: 2021-05-05

## 2021-05-04 DIAGNOSIS — F41.9 ANXIETY: ICD-10-CM

## 2021-05-05 RX ORDER — LORAZEPAM 0.5 MG/1
TABLET ORAL
Qty: 30 TABLET | Refills: 0 | Status: SHIPPED | OUTPATIENT
Start: 2021-05-05 | End: 2021-06-10

## 2021-06-10 DIAGNOSIS — F41.9 ANXIETY: ICD-10-CM

## 2021-06-10 RX ORDER — LORAZEPAM 0.5 MG/1
TABLET ORAL
Qty: 30 TABLET | Refills: 0 | Status: SHIPPED | OUTPATIENT
Start: 2021-06-10 | End: 2021-07-13

## 2021-07-13 DIAGNOSIS — F41.9 ANXIETY: ICD-10-CM

## 2021-07-13 RX ORDER — LORAZEPAM 0.5 MG/1
TABLET ORAL
Qty: 30 TABLET | Refills: 0 | Status: SHIPPED | OUTPATIENT
Start: 2021-07-13 | End: 2021-08-13

## 2021-08-13 DIAGNOSIS — F41.9 ANXIETY: ICD-10-CM

## 2021-08-13 RX ORDER — LORAZEPAM 0.5 MG/1
TABLET ORAL
Qty: 30 TABLET | Refills: 0 | Status: SHIPPED | OUTPATIENT
Start: 2021-08-13 | End: 2021-09-08

## 2021-09-08 DIAGNOSIS — F41.9 ANXIETY: ICD-10-CM

## 2021-09-08 RX ORDER — LORAZEPAM 0.5 MG/1
TABLET ORAL
Qty: 30 TABLET | Refills: 0 | Status: SHIPPED | OUTPATIENT
Start: 2021-09-08 | End: 2021-10-11

## 2021-10-11 DIAGNOSIS — F41.9 ANXIETY: ICD-10-CM

## 2021-10-11 RX ORDER — LORAZEPAM 0.5 MG/1
TABLET ORAL
Qty: 30 TABLET | Refills: 0 | Status: SHIPPED | OUTPATIENT
Start: 2021-10-11 | End: 2021-11-15

## 2021-11-01 RX ORDER — MELOXICAM 15 MG/1
TABLET ORAL
Qty: 90 TABLET | Refills: 2 | Status: SHIPPED | OUTPATIENT
Start: 2021-11-01 | End: 2021-11-02 | Stop reason: SDUPTHER

## 2021-11-02 RX ORDER — MELOXICAM 15 MG/1
15 TABLET ORAL DAILY
Qty: 90 TABLET | Refills: 2 | Status: SHIPPED | OUTPATIENT
Start: 2021-11-02 | End: 2023-02-13

## 2021-11-12 DIAGNOSIS — F41.9 ANXIETY: ICD-10-CM

## 2021-11-15 ENCOUNTER — TELEPHONE (OUTPATIENT)
Dept: INTERNAL MEDICINE | Facility: CLINIC | Age: 66
End: 2021-11-15

## 2021-11-15 RX ORDER — LORAZEPAM 0.5 MG/1
TABLET ORAL
Qty: 30 TABLET | Refills: 0 | Status: SHIPPED | OUTPATIENT
Start: 2021-11-15 | End: 2021-12-17

## 2021-11-15 NOTE — TELEPHONE ENCOUNTER
----- Message from Katharine Richey MA sent at 11/15/2021  7:57 AM EST -----  Regarding: FW: Physical    ----- Message -----  From: Becca Alba  Sent: 11/14/2021  11:14 AM EST  To: Ethan Hinson [facility]  Subject: Physical                                         Dr. Back,   I usually have a physical about this time of year, however, for some reason there is not one scheduled.  I normally schedule this before I leave for the next year.  I am not sure what happened.  I can't get an appoint until late April.  I don't want to go that long without seeing you given my recent medical history.  I would like to see you in December if possible, if not for a full physical, for a review of blood work and a quick check.  Is it possible to work me in maybe at the end of your day sometime in December?  Becca Alba [phone]

## 2021-11-17 ENCOUNTER — OFFICE VISIT (OUTPATIENT)
Dept: INTERNAL MEDICINE | Facility: CLINIC | Age: 66
End: 2021-11-17

## 2021-11-17 VITALS
DIASTOLIC BLOOD PRESSURE: 70 MMHG | HEART RATE: 75 BPM | OXYGEN SATURATION: 98 % | SYSTOLIC BLOOD PRESSURE: 120 MMHG | HEIGHT: 59 IN | BODY MASS INDEX: 25.8 KG/M2 | WEIGHT: 128 LBS

## 2021-11-17 DIAGNOSIS — R20.0 NUMBNESS OF FINGER: Primary | ICD-10-CM

## 2021-11-17 PROCEDURE — 99213 OFFICE O/P EST LOW 20 MIN: CPT | Performed by: INTERNAL MEDICINE

## 2021-11-17 RX ORDER — METHYLPREDNISOLONE 4 MG/1
TABLET ORAL
Qty: 21 TABLET | Refills: 0 | Status: SHIPPED | OUTPATIENT
Start: 2021-11-17 | End: 2021-12-07

## 2021-11-17 RX ORDER — TAMOXIFEN CITRATE 20 MG/1
TABLET ORAL
COMMUNITY
Start: 2021-11-15

## 2021-11-24 DIAGNOSIS — Z00.00 HEALTH MAINTENANCE EXAMINATION: Primary | ICD-10-CM

## 2021-11-24 DIAGNOSIS — M81.0 OSTEOPOROSIS, UNSPECIFIED OSTEOPOROSIS TYPE, UNSPECIFIED PATHOLOGICAL FRACTURE PRESENCE: ICD-10-CM

## 2021-11-25 LAB
25(OH)D3+25(OH)D2 SERPL-MCNC: 21.9 NG/ML (ref 30–100)
ALBUMIN SERPL-MCNC: 4.3 G/DL (ref 3.8–4.8)
ALBUMIN/GLOB SERPL: 1.9 {RATIO} (ref 1.2–2.2)
ALP SERPL-CCNC: 39 IU/L (ref 44–121)
ALT SERPL-CCNC: 8 IU/L (ref 0–32)
APPEARANCE UR: ABNORMAL
AST SERPL-CCNC: 16 IU/L (ref 0–40)
BACTERIA #/AREA URNS HPF: NORMAL /[HPF]
BASOPHILS # BLD AUTO: 0.1 X10E3/UL (ref 0–0.2)
BASOPHILS NFR BLD AUTO: 1 %
BILIRUB SERPL-MCNC: 0.7 MG/DL (ref 0–1.2)
BILIRUB UR QL STRIP: NEGATIVE
BUN SERPL-MCNC: 17 MG/DL (ref 8–27)
BUN/CREAT SERPL: 23 (ref 12–28)
CALCIUM SERPL-MCNC: 9 MG/DL (ref 8.7–10.3)
CASTS URNS QL MICRO: NORMAL /LPF
CHLORIDE SERPL-SCNC: 101 MMOL/L (ref 96–106)
CHOLEST SERPL-MCNC: 178 MG/DL (ref 100–199)
CO2 SERPL-SCNC: 27 MMOL/L (ref 20–29)
COLOR UR: YELLOW
CREAT SERPL-MCNC: 0.74 MG/DL (ref 0.57–1)
EOSINOPHIL # BLD AUTO: 0.1 X10E3/UL (ref 0–0.4)
EOSINOPHIL NFR BLD AUTO: 2 %
EPI CELLS #/AREA URNS HPF: NORMAL /HPF (ref 0–10)
ERYTHROCYTE [DISTWIDTH] IN BLOOD BY AUTOMATED COUNT: 12.6 % (ref 11.7–15.4)
GLOBULIN SER CALC-MCNC: 2.3 G/DL (ref 1.5–4.5)
GLUCOSE SERPL-MCNC: 91 MG/DL (ref 65–99)
GLUCOSE UR QL: NEGATIVE
HCT VFR BLD AUTO: 42.5 % (ref 34–46.6)
HDLC SERPL-MCNC: 67 MG/DL
HGB BLD-MCNC: 14.4 G/DL (ref 11.1–15.9)
HGB UR QL STRIP: NEGATIVE
IMM GRANULOCYTES # BLD AUTO: 0 X10E3/UL (ref 0–0.1)
IMM GRANULOCYTES NFR BLD AUTO: 0 %
KETONES UR QL STRIP: NEGATIVE
LDLC SERPL CALC-MCNC: 97 MG/DL (ref 0–99)
LEUKOCYTE ESTERASE UR QL STRIP: NEGATIVE
LYMPHOCYTES # BLD AUTO: 1.3 X10E3/UL (ref 0.7–3.1)
LYMPHOCYTES NFR BLD AUTO: 37 %
MCH RBC QN AUTO: 29.5 PG (ref 26.6–33)
MCHC RBC AUTO-ENTMCNC: 33.9 G/DL (ref 31.5–35.7)
MCV RBC AUTO: 87 FL (ref 79–97)
MICRO URNS: ABNORMAL
MICRO URNS: ABNORMAL
MONOCYTES # BLD AUTO: 0.4 X10E3/UL (ref 0.1–0.9)
MONOCYTES NFR BLD AUTO: 10 %
NEUTROPHILS # BLD AUTO: 1.7 X10E3/UL (ref 1.4–7)
NEUTROPHILS NFR BLD AUTO: 50 %
NITRITE UR QL STRIP: NEGATIVE
PH UR STRIP: 6.5 [PH] (ref 5–7.5)
PLATELET # BLD AUTO: 170 X10E3/UL (ref 150–450)
POTASSIUM SERPL-SCNC: 4.2 MMOL/L (ref 3.5–5.2)
PROT SERPL-MCNC: 6.6 G/DL (ref 6–8.5)
PROT UR QL STRIP: NEGATIVE
RBC # BLD AUTO: 4.88 X10E6/UL (ref 3.77–5.28)
RBC #/AREA URNS HPF: NORMAL /HPF (ref 0–2)
SODIUM SERPL-SCNC: 140 MMOL/L (ref 134–144)
SP GR UR: 1.01 (ref 1–1.03)
TRIGL SERPL-MCNC: 73 MG/DL (ref 0–149)
TSH SERPL DL<=0.005 MIU/L-ACNC: 1.45 UIU/ML (ref 0.45–4.5)
UROBILINOGEN UR STRIP-MCNC: 0.2 MG/DL (ref 0.2–1)
VLDLC SERPL CALC-MCNC: 14 MG/DL (ref 5–40)
WBC # BLD AUTO: 3.5 X10E3/UL (ref 3.4–10.8)
WBC #/AREA URNS HPF: NORMAL /HPF (ref 0–5)

## 2021-12-07 ENCOUNTER — OFFICE VISIT (OUTPATIENT)
Dept: INTERNAL MEDICINE | Facility: CLINIC | Age: 66
End: 2021-12-07

## 2021-12-07 VITALS
DIASTOLIC BLOOD PRESSURE: 74 MMHG | BODY MASS INDEX: 22.5 KG/M2 | SYSTOLIC BLOOD PRESSURE: 110 MMHG | OXYGEN SATURATION: 99 % | HEIGHT: 63 IN | WEIGHT: 127 LBS | HEART RATE: 71 BPM

## 2021-12-07 DIAGNOSIS — Z12.4 ENCOUNTER FOR SCREENING FOR CERVICAL CANCER: ICD-10-CM

## 2021-12-07 DIAGNOSIS — Z00.00 WELL ADULT EXAM: Primary | ICD-10-CM

## 2021-12-07 DIAGNOSIS — H93.13 BILATERAL TINNITUS: ICD-10-CM

## 2021-12-07 PROBLEM — Z17.0 CANCER OF RIGHT BREAST, STAGE 1, ESTROGEN RECEPTOR POSITIVE (HCC): Status: ACTIVE | Noted: 2021-02-17

## 2021-12-07 PROBLEM — C50.911 CANCER OF RIGHT BREAST, STAGE 1, ESTROGEN RECEPTOR POSITIVE (HCC): Status: ACTIVE | Noted: 2021-02-17

## 2021-12-07 PROCEDURE — 99396 PREV VISIT EST AGE 40-64: CPT | Performed by: INTERNAL MEDICINE

## 2021-12-07 NOTE — PROGRESS NOTES
Subjective     Becca Alba is a 65 y.o. female who presents for a complete physical exam.      History of Present Illness     The following data was reviewed by: Shefali Back MD on 12/07/2021:  Common labs    Common Labsle 1/28/21 11/24/21 11/24/21 11/24/21     1048 1048 1048   Glucose 87  91    BUN 20  17    Creatinine 0.66  0.74    eGFR Non  Am 90  86    eGFR African Am 110  100    Sodium 141  140    Potassium 4.0  4.2    Chloride 102  101    Calcium 9.4  9.0    Total Protein   6.6    Albumin   4.3    Total Bilirubin   0.7    Alkaline Phosphatase   39 (A)    AST (SGOT)   16    ALT (SGPT)   8    WBC  3.5     Hemoglobin  14.4     Hematocrit  42.5     Platelets  170     Total Cholesterol    178   Triglycerides    73   HDL Cholesterol    67   LDL Cholesterol     97   (A) Abnormal value       Comments are available for some flowsheets but are not being displayed.           HLD.  Contro is good.  OP.  S/p five years of Reclast.  We discussed calcium and D.  Last BMD level was into osteopenia.        Review of Systems   Constitutional: Negative.    HENT:        Ringing in ears at night.    Eyes: Negative.    Respiratory: Negative.    Cardiovascular: Positive for chest pain.   Gastrointestinal: Negative.    Endocrine: Negative.    Genitourinary: Negative.    Musculoskeletal: Negative.    Skin: Negative.    Allergic/Immunologic: Negative.    Neurological: Negative.    Hematological: Negative.    Psychiatric/Behavioral: Negative.        The following portions of the patient's history were reviewed and updated as appropriate: allergies, current medications, past family history, past medical history, past social history, past surgical history and problem list.  Health maintenance tab was reviewed and updated with the patient.       Patient Active Problem List    Diagnosis Date Noted   • Cancer of right breast, stage 1, estrogen receptor positive (HCC) 02/17/2021   • Other insomnia 11/25/2019   •  Hyperlipidemia 04/25/2016     Note Last Updated: 11/25/2019     2.9% ASCVD risk on 11/25/2019     • Leukopenia 04/25/2016     Note Last Updated: 4/25/2016     Description: chronic s/p CBC evaluation.     • Osteoporosis 04/25/2016     Note Last Updated: 12/7/2021     Fosamax caused stomach upset.  Reclast given for five years.       • Uncomplicated asthma 04/25/2016       Past Medical History:   Diagnosis Date   • Allergic rhinitis    • Atypical chest pain    • Chronic leukopenia    • Depression    • H/O bone density study 12/21/2015   • H/O mammogram 12/29/2015   • History of EKG 11/09/2015   • History of fever    • History of herpes genitalis    • Hyperlipidemia    • IBS (irritable bowel syndrome)    • Osteopenia    • Osteoporosis    • Rib pain on left side    • Rib pain on left side    • Tinnitus        Past Surgical History:   Procedure Laterality Date   • BLADDER SURGERY     • COLONOSCOPY  07/20/2012   • NEUROMA SURGERY  12/18/2014       Family History   Problem Relation Age of Onset   • Diabetes type II Mother    • Osteoarthritis Mother    • Macular degeneration Father    • Myelodysplastic syndrome Father    • Heart disease Maternal Grandmother    • Lung cancer Maternal Grandmother    • Colon cancer Maternal Grandmother    • Celiac disease Daughter    • Heart disease Maternal Grandfather        Social History     Socioeconomic History   • Marital status:    Tobacco Use   • Smoking status: Former Smoker     Packs/day: 0.00     Years: 0.00     Pack years: 0.00   • Smokeless tobacco: Never Used   • Tobacco comment: quit in early 20's   Substance and Sexual Activity   • Alcohol use: Yes     Alcohol/week: 1.0 standard drink     Types: 1 Glasses of wine per week     Comment: Social   • Drug use: No   • Sexual activity: Not Currently     Partners: Male     Birth control/protection: Abstinence       Current Outpatient Medications on File Prior to Visit   Medication Sig Dispense Refill   • beclomethasone (Qvar)  "40 MCG/ACT inhaler Inhale 2 puffs 2 (Two) Times a Day. 1 each 1   • LORazepam (ATIVAN) 0.5 MG tablet TAKE ONE TABLET BY MOUTH EVERY 8 HOURS AS NEEDED FOR ANXIETY 30 tablet 0   • meloxicam (MOBIC) 15 MG tablet Take 1 tablet by mouth Daily. 90 tablet 2   • Multiple Vitamin (MULTI VITAMIN DAILY PO) Take  by mouth.     • Qvar 40 MCG/ACT inhaler INHALE TWO PUFFS BY MOUTH TWICE A DAY 8.7 each 0   • tamoxifen (NOLVADEX) 20 MG chemo tablet      • valACYclovir (VALTREX) 500 MG tablet Take 1 tablet by mouth 2 (Two) Times a Day. (Patient taking differently: Take 500 mg by mouth 2 (Two) Times a Day. prn) 30 tablet 5   • [DISCONTINUED] methylPREDNISolone (MEDROL) 4 MG dose pack Take as directed on package instructions. 21 tablet 0     No current facility-administered medications on file prior to visit.       Allergies   Allergen Reactions   • Codeine    • Hydrocodone    • Nitrofurantoin Nausea Only   • Amoxicillin-Pot Clavulanate GI Intolerance   • Pneumovax [Pneumococcal Polysaccharide Vaccine] Rash       Immunization History   Administered Date(s) Administered   • COVID-19 (MODERNA) 1st, 2nd, 3rd Dose Only 02/03/2021, 03/03/2021, 09/03/2021   • Flu Vaccine Quad PF >18YRS 11/14/2016, 11/20/2017   • FluLaval/Fluarix/Fluzone >6 09/14/2020   • Hepatitis A 05/04/2018, 11/21/2018   • Influenza Quad Vaccine (Inpatient) 11/20/2017   • Influenza TIV (IM) 01/01/2014, 11/09/2015   • Influenza, Unspecified 11/21/2018, 10/18/2019   • Pneumococcal Polysaccharide (PPSV23) 11/20/2017   • Shingrix 03/25/2019, 08/18/2019, 09/01/2019, 09/01/2019, 10/01/2019   • Tdap 07/22/2008, 12/27/2018   • flucelvax quad pfs =>4 YRS 11/21/2018, 10/18/2019       Objective     /74   Pulse 71   Ht 160 cm (62.99\")   Wt 57.6 kg (127 lb)   SpO2 99%   BMI 22.50 kg/m²     Physical Exam  Constitutional:       Appearance: She is well-developed.   HENT:      Head: Normocephalic and atraumatic.      Right Ear: Hearing, tympanic membrane and external ear " normal.      Left Ear: Hearing, tympanic membrane and external ear normal.      Nose: Nose normal.   Neck:      Thyroid: No thyromegaly.   Cardiovascular:      Rate and Rhythm: Normal rate and regular rhythm.      Heart sounds: Normal heart sounds. No murmur heard.      Pulmonary:      Effort: Pulmonary effort is normal.      Breath sounds: Normal breath sounds.   Chest:   Breasts:      Right: No mass.      Left: No mass.       Abdominal:      General: There is no distension.      Palpations: Abdomen is soft.      Tenderness: There is no abdominal tenderness.   Genitourinary:     Labia:         Right: No lesion.         Left: No lesion.       Vagina: Normal.      Cervix: No cervical motion tenderness, discharge or friability.      Adnexa:         Right: No mass or tenderness.          Left: No mass or tenderness.     Musculoskeletal:      Cervical back: Neck supple.   Lymphadenopathy:      Cervical: No cervical adenopathy.   Skin:     General: Skin is warm and dry.   Neurological:      Mental Status: She is alert and oriented to person, place, and time.   Psychiatric:         Speech: Speech normal.         Behavior: Behavior normal.         Thought Content: Thought content normal.         Judgment: Judgment normal.         Assessment/Plan   Diagnoses and all orders for this visit:    1. Well adult exam (Primary)    2. Bilateral tinnitus  -     Ambulatory Referral to ENT (Otolaryngology)    3. Encounter for screening for cervical cancer  -     IgP, Aptima HPV        Discussion    Patient presents today for a CPE.      Patient follows a healthy diet.   Patient follows an adequate exercise regimen. Mammogram is up to date.   Colon cancer screening is up to date.   Pap smear obtained today. Immunizations are up to date.   DEXA is up to date.          Health Maintenance   Topic Date Due   • ANNUAL PHYSICAL  11/26/2020   • PAP SMEAR  11/21/2021   • MAMMOGRAM  02/12/2022   • LIPID PANEL  11/24/2022   • Pneumococcal Vaccine  0-64 (2 of 2 - PPSV23) 12/20/2022   • DXA SCAN  04/16/2023   • COLORECTAL CANCER SCREENING  01/15/2028   • TDAP/TD VACCINES (3 - Td or Tdap) 12/27/2028   • HEPATITIS C SCREENING  Completed   • COVID-19 Vaccine  Completed   • INFLUENZA VACCINE  Completed   • ZOSTER VACCINE  Completed            Future Appointments   Date Time Provider Department Center   11/23/2022  9:10 AM [facility]   12/9/2022  3:30 PM Shefali Back MD [facility]

## 2021-12-10 LAB
CYTOLOGIST CVX/VAG CYTO: NORMAL
CYTOLOGY CVX/VAG DOC CYTO: NORMAL
CYTOLOGY CVX/VAG DOC THIN PREP: NORMAL
DX ICD CODE: NORMAL
HIV 1 & 2 AB SER-IMP: NORMAL
HPV I/H RISK 4 DNA CVX QL PROBE+SIG AMP: NEGATIVE
OTHER STN SPEC: NORMAL
STAT OF ADQ CVX/VAG CYTO-IMP: NORMAL

## 2021-12-17 DIAGNOSIS — F41.9 ANXIETY: ICD-10-CM

## 2021-12-17 RX ORDER — LORAZEPAM 0.5 MG/1
TABLET ORAL
Qty: 30 TABLET | Refills: 0 | Status: SHIPPED | OUTPATIENT
Start: 2021-12-17 | End: 2022-01-27

## 2022-01-26 DIAGNOSIS — F41.9 ANXIETY: ICD-10-CM

## 2022-01-27 RX ORDER — LORAZEPAM 0.5 MG/1
TABLET ORAL
Qty: 30 TABLET | Refills: 0 | Status: SHIPPED | OUTPATIENT
Start: 2022-01-27 | End: 2022-03-10

## 2022-03-09 DIAGNOSIS — F41.9 ANXIETY: ICD-10-CM

## 2022-03-10 RX ORDER — LORAZEPAM 0.5 MG/1
TABLET ORAL
Qty: 30 TABLET | Refills: 0 | Status: SHIPPED | OUTPATIENT
Start: 2022-03-10 | End: 2022-04-18

## 2022-04-17 DIAGNOSIS — F41.9 ANXIETY: ICD-10-CM

## 2022-04-18 RX ORDER — LORAZEPAM 0.5 MG/1
TABLET ORAL
Qty: 30 TABLET | Refills: 0 | Status: SHIPPED | OUTPATIENT
Start: 2022-04-18 | End: 2022-05-23

## 2022-05-20 DIAGNOSIS — F41.9 ANXIETY: ICD-10-CM

## 2022-05-23 RX ORDER — LORAZEPAM 0.5 MG/1
TABLET ORAL
Qty: 30 TABLET | Refills: 0 | Status: SHIPPED | OUTPATIENT
Start: 2022-05-23 | End: 2022-06-27

## 2022-06-27 DIAGNOSIS — F41.9 ANXIETY: ICD-10-CM

## 2022-06-27 RX ORDER — LORAZEPAM 0.5 MG/1
TABLET ORAL
Qty: 90 TABLET | Refills: 1 | Status: SHIPPED | OUTPATIENT
Start: 2022-06-27 | End: 2023-03-10

## 2022-11-23 ENCOUNTER — OFFICE VISIT (OUTPATIENT)
Dept: INTERNAL MEDICINE | Facility: CLINIC | Age: 67
End: 2022-11-23

## 2022-11-23 VITALS
HEIGHT: 63 IN | DIASTOLIC BLOOD PRESSURE: 70 MMHG | OXYGEN SATURATION: 98 % | WEIGHT: 138 LBS | SYSTOLIC BLOOD PRESSURE: 120 MMHG | BODY MASS INDEX: 24.45 KG/M2 | HEART RATE: 67 BPM

## 2022-11-23 DIAGNOSIS — M25.551 RIGHT HIP PAIN: Primary | ICD-10-CM

## 2022-11-23 DIAGNOSIS — M79.671 RIGHT FOOT PAIN: ICD-10-CM

## 2022-11-23 DIAGNOSIS — E78.5 HYPERLIPIDEMIA, UNSPECIFIED HYPERLIPIDEMIA TYPE: Primary | ICD-10-CM

## 2022-11-23 DIAGNOSIS — M81.0 OSTEOPOROSIS, UNSPECIFIED OSTEOPOROSIS TYPE, UNSPECIFIED PATHOLOGICAL FRACTURE PRESENCE: ICD-10-CM

## 2022-11-23 PROBLEM — Z90.710 HISTORY OF HYSTERECTOMY WITH BILATERAL OOPHORECTOMY: Status: ACTIVE | Noted: 2022-06-13

## 2022-11-23 PROBLEM — Z90.13 S/P BILATERAL MASTECTOMY: Status: ACTIVE | Noted: 2021-03-26

## 2022-11-23 PROBLEM — Z90.722 HISTORY OF HYSTERECTOMY WITH BILATERAL OOPHORECTOMY: Status: ACTIVE | Noted: 2022-06-13

## 2022-11-23 PROBLEM — Z79.810 LONG-TERM CURRENT USE OF TAMOXIFEN: Status: ACTIVE | Noted: 2022-04-04

## 2022-11-23 LAB
25(OH)D3+25(OH)D2 SERPL-MCNC: 28.7 NG/ML (ref 30–100)
ALBUMIN SERPL-MCNC: 4 G/DL (ref 3.5–5.2)
ALBUMIN/GLOB SERPL: 1.8 G/DL
ALP SERPL-CCNC: 41 U/L (ref 39–117)
ALT SERPL-CCNC: 13 U/L (ref 1–33)
APPEARANCE UR: CLEAR
AST SERPL-CCNC: 22 U/L (ref 1–32)
BACTERIA #/AREA URNS HPF: NORMAL /HPF
BASOPHILS # BLD AUTO: 0.02 10*3/MM3 (ref 0–0.2)
BASOPHILS NFR BLD AUTO: 0.9 % (ref 0–1.5)
BILIRUB SERPL-MCNC: 0.4 MG/DL (ref 0–1.2)
BILIRUB UR QL STRIP: NEGATIVE
BUN SERPL-MCNC: 11 MG/DL (ref 8–23)
BUN/CREAT SERPL: 14.3 (ref 7–25)
CALCIUM SERPL-MCNC: 8.6 MG/DL (ref 8.6–10.5)
CHLORIDE SERPL-SCNC: 106 MMOL/L (ref 98–107)
CHOLEST SERPL-MCNC: 170 MG/DL (ref 0–200)
CO2 SERPL-SCNC: 28.6 MMOL/L (ref 22–29)
COLOR UR: YELLOW
CREAT SERPL-MCNC: 0.77 MG/DL (ref 0.57–1)
EGFRCR SERPLBLD CKD-EPI 2021: 86.3 ML/MIN/1.73
EOSINOPHIL # BLD AUTO: 0.09 10*3/MM3 (ref 0–0.4)
EOSINOPHIL NFR BLD AUTO: 4 % (ref 0.3–6.2)
EPI CELLS #/AREA URNS HPF: NORMAL /HPF
ERYTHROCYTE [DISTWIDTH] IN BLOOD BY AUTOMATED COUNT: 12.4 % (ref 12.3–15.4)
GLOBULIN SER CALC-MCNC: 2.2 GM/DL
GLUCOSE SERPL-MCNC: 90 MG/DL (ref 65–99)
GLUCOSE UR QL STRIP: NEGATIVE
HCT VFR BLD AUTO: 37.5 % (ref 34–46.6)
HDLC SERPL-MCNC: 56 MG/DL (ref 40–60)
HGB BLD-MCNC: 12.6 G/DL (ref 12–15.9)
HGB UR QL STRIP: NEGATIVE
IMM GRANULOCYTES # BLD AUTO: 0 10*3/MM3 (ref 0–0.05)
IMM GRANULOCYTES NFR BLD AUTO: 0 % (ref 0–0.5)
KETONES UR QL STRIP: NEGATIVE
LDLC SERPL CALC-MCNC: 98 MG/DL (ref 0–100)
LEUKOCYTE ESTERASE UR QL STRIP: ABNORMAL
LYMPHOCYTES # BLD AUTO: 0.82 10*3/MM3 (ref 0.7–3.1)
LYMPHOCYTES NFR BLD AUTO: 36.1 % (ref 19.6–45.3)
MCH RBC QN AUTO: 29 PG (ref 26.6–33)
MCHC RBC AUTO-ENTMCNC: 33.6 G/DL (ref 31.5–35.7)
MCV RBC AUTO: 86.2 FL (ref 79–97)
MONOCYTES # BLD AUTO: 0.23 10*3/MM3 (ref 0.1–0.9)
MONOCYTES NFR BLD AUTO: 10.1 % (ref 5–12)
NEUTROPHILS # BLD AUTO: 1.11 10*3/MM3 (ref 1.7–7)
NEUTROPHILS NFR BLD AUTO: 48.9 % (ref 42.7–76)
NITRITE UR QL STRIP: NEGATIVE
NRBC BLD AUTO-RTO: 0 /100 WBC (ref 0–0.2)
PH UR STRIP: 7.5 [PH] (ref 5–8)
PLATELET # BLD AUTO: 141 10*3/MM3 (ref 140–450)
POTASSIUM SERPL-SCNC: 4.1 MMOL/L (ref 3.5–5.2)
PROT SERPL-MCNC: 6.2 G/DL (ref 6–8.5)
PROT UR QL STRIP: NEGATIVE
RBC # BLD AUTO: 4.35 10*6/MM3 (ref 3.77–5.28)
RBC #/AREA URNS HPF: NORMAL /HPF
SODIUM SERPL-SCNC: 141 MMOL/L (ref 136–145)
SP GR UR STRIP: 1.01 (ref 1–1.03)
TRIGL SERPL-MCNC: 88 MG/DL (ref 0–150)
TSH SERPL DL<=0.005 MIU/L-ACNC: 2 UIU/ML (ref 0.27–4.2)
UROBILINOGEN UR STRIP-MCNC: ABNORMAL MG/DL
VLDLC SERPL CALC-MCNC: 16 MG/DL (ref 5–40)
WBC # BLD AUTO: 2.27 10*3/MM3 (ref 3.4–10.8)
WBC #/AREA URNS HPF: NORMAL /HPF

## 2022-11-23 PROCEDURE — 73502 X-RAY EXAM HIP UNI 2-3 VIEWS: CPT | Performed by: INTERNAL MEDICINE

## 2022-11-23 PROCEDURE — 73630 X-RAY EXAM OF FOOT: CPT | Performed by: INTERNAL MEDICINE

## 2022-11-23 PROCEDURE — 99213 OFFICE O/P EST LOW 20 MIN: CPT | Performed by: INTERNAL MEDICINE

## 2022-11-23 RX ORDER — METHYLPREDNISOLONE 4 MG/1
TABLET ORAL
Qty: 21 TABLET | Refills: 0 | Status: SHIPPED | OUTPATIENT
Start: 2022-11-23 | End: 2022-12-09

## 2022-11-23 NOTE — PROGRESS NOTES
Subjective     Becca Alba is a 66 y.o. female who presents with   Chief Complaint   Patient presents with   • Hip Pain   • Foot Pain       History of Present Illness     Right heel pain.  Hurts to stand on it when first getting up.  No injury.      Right hip pain.  Hurts at night.  Hurts to lay on it.  No specific injury.       Review of Systems   Respiratory: Negative.    Cardiovascular: Negative.        The following portions of the patient's history were reviewed and updated as appropriate: allergies, current medications and problem list.    Patient Active Problem List    Diagnosis Date Noted   • History of hysterectomy with bilateral oophorectomy 06/13/2022     Note Last Updated: 11/23/2022     Formatting of this note might be different from the original.  06/06/22 [facility]     • Long-term current use of tamoxifen 04/04/2022   • S/P bilateral mastectomy 03/26/2021     Note Last Updated: 11/23/2022     Formatting of this note might be different from the original.  5/17/2021 Bilateral Mastectomy     • Cancer of right breast, stage 1, estrogen receptor positive (HCC) 02/17/2021   • Other insomnia 11/25/2019   • Hyperlipidemia 04/25/2016     Note Last Updated: 11/25/2019     2.9% ASCVD risk on 11/25/2019     • Leukopenia 04/25/2016     Note Last Updated: 4/25/2016     Description: chronic s/p CBC evaluation.     • Osteoporosis 04/25/2016     Note Last Updated: 12/7/2021     Fosamax caused stomach upset.  Reclast given for five years.       • Uncomplicated asthma 04/25/2016       Current Outpatient Medications on File Prior to Visit   Medication Sig Dispense Refill   • beclomethasone (Qvar) 40 MCG/ACT inhaler Inhale 2 puffs 2 (Two) Times a Day. 8.7 each 2   • beclomethasone (Qvar) 40 MCG/ACT inhaler Inhale 2 puffs 2 (Two) Times a Day. 1 each 1   • LORazepam (ATIVAN) 0.5 MG tablet TAKE ONE TABLET BY MOUTH EVERY 8 HOURS AS NEEDED FOR ANXIETY 90 tablet 1   • meloxicam (MOBIC) 15 MG tablet Take 1 tablet by mouth  "Daily. 90 tablet 2   • Multiple Vitamin (MULTI VITAMIN DAILY PO) Take  by mouth.     • tamoxifen (NOLVADEX) 20 MG chemo tablet      • valACYclovir (VALTREX) 500 MG tablet Take 1 tablet by mouth 2 (Two) Times a Day. (Patient taking differently: Take 500 mg by mouth 2 (Two) Times a Day. prn) 30 tablet 5   • VITAMIN D PO Take  by mouth.     • [DISCONTINUED] azithromycin (Zithromax Z-Earnest) 250 MG tablet Take 2 tablets at the same time Day 1 and then 1 tablet every 24 hour thereafter. 6 tablet 0     No current facility-administered medications on file prior to visit.       Objective     /70   Pulse 67   Ht 160 cm (62.99\")   Wt 62.6 kg (138 lb)   SpO2 98%   BMI 24.45 kg/m²     Physical Exam  Constitutional:       Appearance: She is well-developed.   HENT:      Head: Normocephalic and atraumatic.   Pulmonary:      Effort: Pulmonary effort is normal.   Musculoskeletal:      Right hip: Tenderness present. No bony tenderness. Normal range of motion.      Right foot: Tenderness present.      Comments: Tender over the heel.    Neurological:      Mental Status: She is alert and oriented to person, place, and time.   Psychiatric:         Behavior: Behavior normal.     X-rays of the right hip/right foot  performed today for following indication:   pain.  X-ray reveal nad.  There is no available x-ray for comparison.  X-ray sent to radiology for official interpretation and findings.        Assessment & Plan   Diagnoses and all orders for this visit:    1. Right hip pain (Primary)  -     XR Hip With or Without Pelvis 2 - 3 View Right  -     Ambulatory Referral to Physical Therapy Evaluate and treat    2. Right foot pain  -     XR Foot 3+ View Right (In Office)  -     Ambulatory Referral to Physical Therapy Evaluate and treat    Other orders  -     methylPREDNISolone (MEDROL) 4 MG dose pack; Take as directed on package instructions.  Dispense: 21 tablet; Refill: 0        Discussion    Patient presents with right foot pain " c/w plantar fascitis.  I discussed with the patient a trial of conservative management with:   medrol dose shan and home exercises.  Let me know if not feeling better over the next several weeks or if there is any change in symptoms.  Patient also presents with right hip pain I discussed with the patient a trial of conservative management with:   physical therapy and medrol dose shan.  Let me know if not feeling better over the next several weeks or if there is any change in symptoms.           Future Appointments   Date Time Provider Department Center   12/9/2022  3:30 PM Shefali Back MD [facility]         Answers for HPI/ROS submitted by the patient on 11/23/2022  Please describe your symptoms.: Sorr heal and hip  Have you had these symptoms before?: No  How long have you been having these symptoms?: 5-7 days  Please list any medications you are currently taking for this condition.: Mobic  Please describe any probable cause for these symptoms. : None  What is the primary reason for your visit?: Other

## 2022-12-09 ENCOUNTER — OFFICE VISIT (OUTPATIENT)
Dept: INTERNAL MEDICINE | Facility: CLINIC | Age: 67
End: 2022-12-09

## 2022-12-09 VITALS
WEIGHT: 137 LBS | HEART RATE: 68 BPM | OXYGEN SATURATION: 97 % | BODY MASS INDEX: 24.27 KG/M2 | HEIGHT: 63 IN | SYSTOLIC BLOOD PRESSURE: 124 MMHG | DIASTOLIC BLOOD PRESSURE: 66 MMHG

## 2022-12-09 DIAGNOSIS — Z78.9 VEGETARIAN DIET: ICD-10-CM

## 2022-12-09 DIAGNOSIS — Z00.00 WELL ADULT EXAM: Primary | ICD-10-CM

## 2022-12-09 PROCEDURE — 99396 PREV VISIT EST AGE 40-64: CPT | Performed by: INTERNAL MEDICINE

## 2022-12-09 NOTE — PROGRESS NOTES
Subjective     Becca Alba is a 66 y.o. female who presents for a complete physical exam.      History of Present Illness     The following data was reviewed by: Shefali Back MD on 12/09/2022:  Common labs    Common Labs 6/6/22 6/7/22 11/23/22 11/23/22 11/23/22      [id number] 0936   Glucose    90    BUN    11    Creatinine    0.77    Sodium    141    Potassium    4.1    Chloride    106    Calcium    8.6    Total Protein    6.2    Albumin    4.00    Total Bilirubin    0.4    Alkaline Phosphatase    41    AST (SGOT)    22    ALT (SGPT)    13    WBC   2.27 (A)     Hemoglobin 9.3 (A) 11.2 (A) 12.6     Hematocrit 29.0 (A) 34.9 (A) 37.5     Platelets   141     Total Cholesterol     170   Triglycerides     88   HDL Cholesterol     56   LDL Cholesterol      98   (A) Abnormal value       Comments are available for some flowsheets but are not being displayed.           Labs overall look good.      She requests B12 level because she is a vegetarian.      Review of Systems   Constitutional: Negative.    HENT: Negative.    Eyes: Negative.    Respiratory: Negative.    Cardiovascular: Negative.    Gastrointestinal: Negative.    Endocrine: Negative.    Genitourinary: Negative.    Musculoskeletal: Positive for arthralgias.   Skin: Negative.    Allergic/Immunologic: Negative.    Neurological: Negative.    Hematological: Negative.    Psychiatric/Behavioral: Negative.        The following portions of the patient's history were reviewed and updated as appropriate: allergies, current medications, past family history, past medical history, past social history, past surgical history and problem list.  Health maintenance tab was reviewed and updated with the patient.       Patient Active Problem List    Diagnosis Date Noted   • Vegetarian diet:  CHECK B12 12/09/2022   • History of hysterectomy with bilateral oophorectomy 06/13/2022     Note Last Updated: 11/23/2022     Formatting of this note might be different from the  original.  06/06/22 [facility]SO     • Long-term current use of tamoxifen 04/04/2022   • S/P bilateral mastectomy 03/26/2021     Note Last Updated: 11/23/2022     Formatting of this note might be different from the original.  5/17/2021 Bilateral Mastectomy     • Cancer of right breast, stage 1, estrogen receptor positive (HCC) 02/17/2021   • Other insomnia 11/25/2019   • Hyperlipidemia 04/25/2016     Note Last Updated: 11/25/2019     2.9% ASCVD risk on 11/25/2019     • Leukopenia 04/25/2016     Note Last Updated: 4/25/2016     Description: chronic s/p CBC evaluation.     • Osteoporosis 04/25/2016     Note Last Updated: 12/7/2021     Fosamax caused stomach upset.  Reclast given for five years.       • Uncomplicated asthma 04/25/2016       Past Medical History:   Diagnosis Date   • Allergic rhinitis    • Atypical chest pain    • Chronic leukopenia    • Depression    • H/O bone density study 12/21/2015   • H/O mammogram 12/29/2015   • History of EKG 11/09/2015   • History of fever    • History of herpes genitalis    • Hyperlipidemia    • IBS (irritable bowel syndrome)    • Osteopenia    • Osteoporosis    • Rib pain on left side    • Rib pain on left side    • Tinnitus        Past Surgical History:   Procedure Laterality Date   • BLADDER SURGERY     • COLONOSCOPY  07/20/2012   • NEUROMA SURGERY  12/18/2014       Family History   Problem Relation Age of Onset   • Diabetes type II Mother    • Osteoarthritis Mother    • Macular degeneration Father    • Myelodysplastic syndrome Father    • Heart disease Maternal Grandmother    • Lung cancer Maternal Grandmother    • Colon cancer Maternal Grandmother    • Celiac disease Daughter    • Heart disease Maternal Grandfather        Social History     Socioeconomic History   • Marital status:    Tobacco Use   • Smoking status: Former     Packs/day: 0.00     Years: 0.00     Pack years: 0.00     Types: Cigarettes   • Smokeless tobacco: Never   • Tobacco comments:     quit in early  20's   Vaping Use   • Vaping Use: Never used   Substance and Sexual Activity   • Alcohol use: Yes     Alcohol/week: 1.0 standard drink     Types: 1 Glasses of wine per week     Comment: Social   • Drug use: No   • Sexual activity: Not Currently     Partners: Male     Birth control/protection: Abstinence       Current Outpatient Medications on File Prior to Visit   Medication Sig Dispense Refill   • beclomethasone (Qvar) 40 MCG/ACT inhaler Inhale 2 puffs 2 (Two) Times a Day. 8.7 each 2   • LORazepam (ATIVAN) 0.5 MG tablet TAKE ONE TABLET BY MOUTH EVERY 8 HOURS AS NEEDED FOR ANXIETY 90 tablet 1   • meloxicam (MOBIC) 15 MG tablet Take 1 tablet by mouth Daily. 90 tablet 2   • Multiple Vitamin (MULTI VITAMIN DAILY PO) Take  by mouth.     • tamoxifen (NOLVADEX) 20 MG chemo tablet      • valACYclovir (VALTREX) 500 MG tablet Take 1 tablet by mouth 2 (Two) Times a Day. (Patient taking differently: Take 500 mg by mouth 2 (Two) Times a Day. prn) 30 tablet 5   • VITAMIN D PO Take  by mouth.     • [DISCONTINUED] beclomethasone (Qvar) 40 MCG/ACT inhaler Inhale 2 puffs 2 (Two) Times a Day. 1 each 1   • [DISCONTINUED] methylPREDNISolone (MEDROL) 4 MG dose pack Take as directed on package instructions. 21 tablet 0     No current facility-administered medications on file prior to visit.       Allergies   Allergen Reactions   • Codeine    • Hydrocodone    • Nitrofurantoin Nausea Only   • Amoxicillin-Pot Clavulanate GI Intolerance   • Pneumovax [Pneumococcal Polysaccharide Vaccine] Rash       Immunization History   Administered Date(s) Administered   • COVID-19 (MODERNA) 1st, 2nd, 3rd Dose Only 02/03/2021, 03/03/2021, 09/03/2021   • COVID-19 (UNSPECIFIED) 02/03/2021, 03/03/2021, 09/03/2021   • Covid-19 (Pfizer) Gray Cap 11/25/2022   • FluLaval/Fluzone >6mos 11/21/2018, 10/18/2019, 09/14/2020   • Fluzone Quad >6mos (Multi-dose) 11/14/2016, 11/20/2017   • Hepatitis A 05/04/2018, 11/21/2018   • Influenza Quad Vaccine (Inpatient)  "11/20/2017   • Influenza TIV (IM) 01/01/2014, 11/09/2015   • Influenza, Unspecified 11/21/2018, 10/18/2019, 09/14/2020, 11/11/2022   • Pneumococcal Polysaccharide (PPSV23) 11/20/2017   • Shingrix 03/25/2019, 08/18/2019, 09/01/2019, 09/01/2019, 10/01/2019   • Tdap 07/22/2008, 12/27/2018   • flucelvax quad pfs =>4 YRS 11/21/2018, 10/18/2019       Objective     /66   Pulse 68   Ht 160 cm (62.99\")   Wt 62.1 kg (137 lb)   SpO2 97%   BMI 24.27 kg/m²     Physical Exam  Constitutional:       Appearance: She is well-developed.   HENT:      Head: Normocephalic and atraumatic.      Right Ear: Hearing, tympanic membrane and external ear normal.      Left Ear: Hearing, tympanic membrane and external ear normal.      Nose: Nose normal.   Neck:      Thyroid: No thyromegaly.   Cardiovascular:      Rate and Rhythm: Normal rate and regular rhythm.      Heart sounds: Normal heart sounds. No murmur heard.  Pulmonary:      Effort: Pulmonary effort is normal.      Breath sounds: Normal breath sounds.   Abdominal:      General: There is no distension.      Palpations: Abdomen is soft.      Tenderness: There is no abdominal tenderness.   Musculoskeletal:      Cervical back: Neck supple.   Lymphadenopathy:      Cervical: No cervical adenopathy.   Skin:     General: Skin is warm and dry.   Neurological:      Mental Status: She is alert and oriented to person, place, and time.   Psychiatric:         Speech: Speech normal.         Behavior: Behavior normal.         Thought Content: Thought content normal.         Judgment: Judgment normal.         Assessment & Plan   Diagnoses and all orders for this visit:    1. Well adult exam (Primary)    2. Vegetarian diet:  CHECK B12  -     Vitamin B12        Discussion    Patient presents today for a CPE.      Patient follows a healthy diet.   Patient follows an adequate exercise regimen. Mammogram is not indicated. Colon cancer screening is up to date.   Pap smear no longer performed " secondary to hysterectomy.   Immunizations are up to date.   DEXA is up to date.  I recommend a diet high in fruits, vegetables, whole grains, lean meats, nuts and beans.  I recommend limiting red meat, full fat dairy, eggs and processed white carbohydrates.  I recommend aerobic exercise at least 3 days per week.      Health Maintenance   Topic Date Due   • ANNUAL PHYSICAL  12/08/2022   • COVID-19 Vaccine (5 - Booster for Moderna series) 01/20/2023   • MAMMOGRAM  02/12/2023   • DXA SCAN  04/16/2023   • LIPID PANEL  11/23/2023   • PAP SMEAR  12/07/2024   • COLORECTAL CANCER SCREENING  01/15/2028   • TDAP/TD VACCINES (3 - Td or Tdap) 12/27/2028   • HEPATITIS C SCREENING  Completed   • INFLUENZA VACCINE  Completed   • ZOSTER VACCINE  Completed   • Pneumococcal Vaccine 0-64  Discontinued            Future Appointments   Date Time Provider Department Center   12/20/2022  9:00 AM Ariana Abrue PT [facility]   12/4/2023 11:00 AM [facility]   12/11/2023  3:30 PM Shefali Back MD [facility]

## 2022-12-10 LAB — VIT B12 SERPL-MCNC: 1234 PG/ML (ref 211–946)

## 2023-02-13 RX ORDER — MELOXICAM 15 MG/1
TABLET ORAL
Qty: 90 TABLET | Refills: 2 | Status: SHIPPED | OUTPATIENT
Start: 2023-02-13

## 2023-03-09 DIAGNOSIS — F41.9 ANXIETY: ICD-10-CM

## 2023-03-10 RX ORDER — LORAZEPAM 0.5 MG/1
TABLET ORAL
Qty: 90 TABLET | Refills: 0 | Status: SHIPPED | OUTPATIENT
Start: 2023-03-10

## 2023-05-15 RX ORDER — VALACYCLOVIR HYDROCHLORIDE 500 MG/1
500 TABLET, FILM COATED ORAL 2 TIMES DAILY
Qty: 30 TABLET | Refills: 5 | Status: SHIPPED | OUTPATIENT
Start: 2023-05-15

## 2023-06-06 ENCOUNTER — OFFICE VISIT (OUTPATIENT)
Dept: INTERNAL MEDICINE | Facility: CLINIC | Age: 68
End: 2023-06-06
Payer: COMMERCIAL

## 2023-06-06 VITALS
SYSTOLIC BLOOD PRESSURE: 110 MMHG | HEART RATE: 71 BPM | OXYGEN SATURATION: 98 % | BODY MASS INDEX: 24.8 KG/M2 | DIASTOLIC BLOOD PRESSURE: 70 MMHG | WEIGHT: 140 LBS | HEIGHT: 63 IN

## 2023-06-06 DIAGNOSIS — M25.551 RIGHT HIP PAIN: Primary | ICD-10-CM

## 2023-06-06 DIAGNOSIS — Z80.0 FAMILY HISTORY OF COLON CANCER: ICD-10-CM

## 2023-06-06 DIAGNOSIS — Z83.71 FAMILY HISTORY OF COLONIC POLYPS: ICD-10-CM

## 2023-06-06 DIAGNOSIS — E78.5 HYPERLIPIDEMIA, UNSPECIFIED HYPERLIPIDEMIA TYPE: ICD-10-CM

## 2023-06-06 DIAGNOSIS — Z12.11 SCREENING FOR COLON CANCER: ICD-10-CM

## 2023-06-06 PROCEDURE — 99213 OFFICE O/P EST LOW 20 MIN: CPT | Performed by: INTERNAL MEDICINE

## 2023-06-06 NOTE — PROGRESS NOTES
Subjective     Becca Alba is a 67 y.o. female who presents with   Chief Complaint   Patient presents with    Hip Pain       History of Present Illness     The following data was reviewed by: Shefali Back MD on 06/06/2023:    Data reviewed : Radiologic studies right hip        Right hip pain.  Off and on off since November.  Bad in November and December.  Certain motions bother.      Review of Systems   Respiratory: Negative.     Cardiovascular: Negative.      The following portions of the patient's history were reviewed and updated as appropriate: allergies, current medications and problem list.    Patient Active Problem List    Diagnosis Date Noted    Vegetarian diet:  CHECK B12 12/09/2022    History of hysterectomy with bilateral oophorectomy 06/13/2022     Note Last Updated: 11/23/2022     Formatting of this note might be different from the original.  06/06/22 [facility]      Long-term current use of tamoxifen 04/04/2022    S/P bilateral mastectomy 03/26/2021     Note Last Updated: 11/23/2022     Formatting of this note might be different from the original.  5/17/2021 Bilateral Mastectomy      Cancer of right breast, stage 1, estrogen receptor positive 02/17/2021    Other insomnia 11/25/2019    Hyperlipidemia 04/25/2016     Note Last Updated: 11/25/2019     2.9% ASCVD risk on 11/25/2019      Leukopenia 04/25/2016     Note Last Updated: 4/25/2016     Description: chronic s/p CBC evaluation.      Osteoporosis 04/25/2016     Note Last Updated: 12/7/2021     Fosamax caused stomach upset.  Reclast given for five years.        Uncomplicated asthma 04/25/2016       Current Outpatient Medications on File Prior to Visit   Medication Sig Dispense Refill    beclomethasone (Qvar) 40 MCG/ACT inhaler Inhale 2 puffs 2 (Two) Times a Day. 8.7 each 2    LORazepam (ATIVAN) 0.5 MG tablet TAKE ONE TABLET BY MOUTH EVERY 8 HOURS AS NEEDED FOR ANXIETY 90 tablet 0    meloxicam (MOBIC) 15 MG tablet TAKE ONE TABLET BY MOUTH  "DAILY 90 tablet 2    Multiple Vitamin (MULTI VITAMIN DAILY PO) Take  by mouth.      tamoxifen (NOLVADEX) 20 MG chemo tablet       valACYclovir (VALTREX) 500 MG tablet Take 1 tablet by mouth 2 (Two) Times a Day. 30 tablet 5    VITAMIN D PO Take  by mouth.       No current facility-administered medications on file prior to visit.       Objective     /70   Pulse 71   Ht 160 cm (62.99\")   Wt 63.5 kg (140 lb)   SpO2 98%   BMI 24.81 kg/m²     Physical Exam  Constitutional:       Appearance: She is well-developed.   HENT:      Head: Normocephalic and atraumatic.   Pulmonary:      Effort: Pulmonary effort is normal.   Neurological:      Mental Status: She is alert and oriented to person, place, and time.   Psychiatric:         Behavior: Behavior normal.       Assessment & Plan   Diagnoses and all orders for this visit:    1. Right hip pain (Primary)    2. Screening for colon cancer  -     Ambulatory Referral For Screening Colonoscopy    3. Family history of colon cancer  -     Ambulatory Referral For Screening Colonoscopy    4. Family history of colonic polyps  -     Ambulatory Referral For Screening Colonoscopy    5. Hyperlipidemia, unspecified hyperlipidemia type  -     CBC & Differential  -     Comprehensive Metabolic Panel  -     Lipid Panel        Discussion    Patient presents in f/u of right hip pain secondary to arthritis.  I discussed with the patient continuing conservative management with:   NSAIDs, tylenol, rest, and home based exercises.   Consider orthopedic referral if a worsening bother.        Future Appointments   Date Time Provider Department Center   6/6/2023 10:45 AM Shefali Back MD [facility]   12/4/2023 11:00 AM [facility]   12/11/2023  3:30 PM Shefali Back MD [facility]         "

## 2023-06-08 LAB
ALBUMIN SERPL-MCNC: 4.2 G/DL (ref 3.5–5.2)
ALBUMIN/GLOB SERPL: 1.8 G/DL
ALP SERPL-CCNC: 38 U/L (ref 39–117)
ALT SERPL-CCNC: 12 U/L (ref 1–33)
AST SERPL-CCNC: 19 U/L (ref 1–32)
BASOPHILS # BLD AUTO: 0.02 10*3/MM3 (ref 0–0.2)
BASOPHILS NFR BLD AUTO: 0.9 % (ref 0–1.5)
BILIRUB SERPL-MCNC: 0.5 MG/DL (ref 0–1.2)
BUN SERPL-MCNC: 18 MG/DL (ref 8–23)
BUN/CREAT SERPL: 22.5 (ref 7–25)
CALCIUM SERPL-MCNC: 8.8 MG/DL (ref 8.6–10.5)
CHLORIDE SERPL-SCNC: 103 MMOL/L (ref 98–107)
CHOLEST SERPL-MCNC: 200 MG/DL (ref 0–200)
CO2 SERPL-SCNC: 27.7 MMOL/L (ref 22–29)
CREAT SERPL-MCNC: 0.8 MG/DL (ref 0.57–1)
EGFRCR SERPLBLD CKD-EPI 2021: 81.9 ML/MIN/1.73
EOSINOPHIL # BLD AUTO: 0.12 10*3/MM3 (ref 0–0.4)
EOSINOPHIL NFR BLD AUTO: 5.1 % (ref 0.3–6.2)
ERYTHROCYTE [DISTWIDTH] IN BLOOD BY AUTOMATED COUNT: 12.6 % (ref 12.3–15.4)
GLOBULIN SER CALC-MCNC: 2.3 GM/DL
GLUCOSE SERPL-MCNC: 95 MG/DL (ref 65–99)
HCT VFR BLD AUTO: 39.8 % (ref 34–46.6)
HDLC SERPL-MCNC: 59 MG/DL (ref 40–60)
HGB BLD-MCNC: 13.2 G/DL (ref 12–15.9)
IMM GRANULOCYTES # BLD AUTO: 0 10*3/MM3 (ref 0–0.05)
IMM GRANULOCYTES NFR BLD AUTO: 0 % (ref 0–0.5)
LDLC SERPL CALC-MCNC: 123 MG/DL (ref 0–100)
LYMPHOCYTES # BLD AUTO: 0.78 10*3/MM3 (ref 0.7–3.1)
LYMPHOCYTES NFR BLD AUTO: 33.3 % (ref 19.6–45.3)
MCH RBC QN AUTO: 28.6 PG (ref 26.6–33)
MCHC RBC AUTO-ENTMCNC: 33.2 G/DL (ref 31.5–35.7)
MCV RBC AUTO: 86.1 FL (ref 79–97)
MONOCYTES # BLD AUTO: 0.2 10*3/MM3 (ref 0.1–0.9)
MONOCYTES NFR BLD AUTO: 8.5 % (ref 5–12)
NEUTROPHILS # BLD AUTO: 1.22 10*3/MM3 (ref 1.7–7)
NEUTROPHILS NFR BLD AUTO: 52.2 % (ref 42.7–76)
NRBC BLD AUTO-RTO: 0 /100 WBC (ref 0–0.2)
PLATELET # BLD AUTO: 159 10*3/MM3 (ref 140–450)
POTASSIUM SERPL-SCNC: 4.1 MMOL/L (ref 3.5–5.2)
PROT SERPL-MCNC: 6.5 G/DL (ref 6–8.5)
RBC # BLD AUTO: 4.62 10*6/MM3 (ref 3.77–5.28)
SODIUM SERPL-SCNC: 139 MMOL/L (ref 136–145)
TRIGL SERPL-MCNC: 99 MG/DL (ref 0–150)
VLDLC SERPL CALC-MCNC: 18 MG/DL (ref 5–40)
WBC # BLD AUTO: 2.34 10*3/MM3 (ref 3.4–10.8)

## 2023-06-20 PROBLEM — Z80.0 FAMILY HISTORY OF COLON CANCER: Status: ACTIVE | Noted: 2023-06-20

## 2023-06-20 PROBLEM — Z12.11 SCREENING FOR COLON CANCER: Status: ACTIVE | Noted: 2023-06-20

## 2023-07-06 PROBLEM — K63.5 COLON POLYPS: Status: ACTIVE | Noted: 2023-07-06

## 2023-07-06 PROBLEM — K57.90 DIVERTICULOSIS: Status: ACTIVE | Noted: 2023-07-06

## 2023-09-25 ENCOUNTER — OFFICE VISIT (OUTPATIENT)
Dept: INTERNAL MEDICINE | Facility: CLINIC | Age: 68
End: 2023-09-25

## 2023-09-25 VITALS
HEIGHT: 63 IN | HEART RATE: 72 BPM | SYSTOLIC BLOOD PRESSURE: 112 MMHG | BODY MASS INDEX: 23.92 KG/M2 | WEIGHT: 135 LBS | DIASTOLIC BLOOD PRESSURE: 68 MMHG | OXYGEN SATURATION: 96 %

## 2023-09-25 DIAGNOSIS — R30.0 DYSURIA: ICD-10-CM

## 2023-09-25 DIAGNOSIS — R35.0 URINE FREQUENCY: Primary | ICD-10-CM

## 2023-09-25 LAB
BILIRUB BLD-MCNC: NEGATIVE MG/DL
CLARITY, POC: ABNORMAL
COLOR UR: ABNORMAL
EXPIRATION DATE: ABNORMAL
GLUCOSE UR STRIP-MCNC: NEGATIVE MG/DL
KETONES UR QL: NEGATIVE
LEUKOCYTE EST, POC: ABNORMAL
Lab: ABNORMAL
NITRITE UR-MCNC: NEGATIVE MG/ML
PH UR: 7 [PH] (ref 5–8)
PROT UR STRIP-MCNC: NEGATIVE MG/DL
RBC # UR STRIP: NEGATIVE /UL
SP GR UR: 1.02 (ref 1–1.03)
UROBILINOGEN UR QL: ABNORMAL

## 2023-09-25 RX ORDER — CEPHALEXIN 500 MG/1
500 CAPSULE ORAL 2 TIMES DAILY
Qty: 14 CAPSULE | Refills: 0 | Status: SHIPPED | OUTPATIENT
Start: 2023-09-25

## 2023-09-25 RX ORDER — SACCHAROMYCES BOULARDII 250 MG
250 CAPSULE ORAL 2 TIMES DAILY
Qty: 20 CAPSULE | Refills: 0 | Status: SHIPPED | OUTPATIENT
Start: 2023-09-25

## 2023-09-25 NOTE — PROGRESS NOTES
"Chief Complaint   Patient presents with    Urinary Tract Infection       Urinary Tract Infection      Becca Alba is a 67 y.o. female presents for acute care. She reports \"it hurts when I pee\". No freq or urgency. Recently to urgent care for similar symptoms and was rx bactrim for uti and completed this 2 weeks ago. She did not have cultures taken. Mild constipation after starting arimidex.         The following portions of the patient's history were reviewed and updated as appropriate: allergies, current medications, past family history, past medical history, past social history, past surgical history and problem list.  Current Outpatient Medications on File Prior to Visit   Medication Sig Dispense Refill    acetaminophen (TYLENOL) 500 MG tablet Take 2 tablets by mouth Every 6 (Six) Hours As Needed for Mild Pain.      anastrozole (ARIMIDEX) 1 MG tablet Take 1 tablet by mouth Daily.      beclomethasone (Qvar) 40 MCG/ACT inhaler Inhale 2 puffs 2 (Two) Times a Day. 8.7 each 2    LORazepam (ATIVAN) 0.5 MG tablet TAKE ONE TABLET BY MOUTH EVERY 8 HOURS AS NEEDED FOR ANXIETY 90 tablet 0    meloxicam (MOBIC) 15 MG tablet TAKE ONE TABLET BY MOUTH DAILY (Patient taking differently: Take 1 tablet by mouth Daily As Needed.) 90 tablet 2    Multiple Vitamins-Minerals (ICAPS AREDS 2 PO) Take 1 capsule by mouth 2 (Two) Times a Day.      valACYclovir (VALTREX) 500 MG tablet Take 1 tablet by mouth 2 (Two) Times a Day. 30 tablet 5    VITAMIN D PO Take 1 capsule by mouth Daily.      ibuprofen (ADVIL,MOTRIN) 200 MG tablet Take 1 tablet by mouth Every 6 (Six) Hours As Needed for Mild Pain.       No current facility-administered medications on file prior to visit.     Review of Systems   Constitutional: Negative.    HENT: Negative.     Eyes: Negative.    Respiratory: Negative.     Cardiovascular: Negative.    Gastrointestinal:  Positive for constipation.   Endocrine: Negative.    Genitourinary: Negative.         Dryness " "  Musculoskeletal: Negative.    Allergic/Immunologic: Negative.    Neurological: Negative.    Hematological: Negative.    Psychiatric/Behavioral: Negative.       Objective   Physical Exam  Vitals and nursing note reviewed.   Constitutional:       Appearance: Normal appearance. She is well-developed.   HENT:      Head: Normocephalic and atraumatic.      Right Ear: Tympanic membrane and external ear normal.      Left Ear: Tympanic membrane and external ear normal.      Nose: Nose normal.      Mouth/Throat:      Mouth: Mucous membranes are moist.   Eyes:      Extraocular Movements: Extraocular movements intact.      Pupils: Pupils are equal, round, and reactive to light.   Cardiovascular:      Rate and Rhythm: Normal rate and regular rhythm.      Heart sounds: Normal heart sounds.   Pulmonary:      Effort: Pulmonary effort is normal. No respiratory distress.      Breath sounds: Normal breath sounds.   Abdominal:      General: Abdomen is flat. There is no distension.      Palpations: Abdomen is soft. There is no mass.      Tenderness: There is no abdominal tenderness. There is no guarding or rebound.      Hernia: No hernia is present.   Genitourinary:     General: Normal vulva.      Rectum: Normal.   Musculoskeletal:         General: Normal range of motion.      Cervical back: Neck supple.   Skin:     General: Skin is warm and dry.   Neurological:      General: No focal deficit present.      Mental Status: She is alert and oriented to person, place, and time.   Psychiatric:         Mood and Affect: Mood normal.         Behavior: Behavior normal.         Thought Content: Thought content normal.         Judgment: Judgment normal.        /68   Pulse 72   Ht 160 cm (63\")   Wt 61.2 kg (135 lb)   SpO2 96%   BMI 23.91 kg/m²     Assessment & Plan   Diagnoses and all orders for this visit:    Urine frequency  -     POC Urinalysis Dipstick, Automated  -     Urine Culture - Urine, Urine, Clean Catch    Dysuria    Other " orders  -     Fluzone High-Dose 65+yrs (7476-1800)  -     cephalexin (Keflex) 500 MG capsule; Take 1 capsule by mouth 2 (Two) Times a Day.  -     saccharomyces boulardii (Florastor) 250 MG capsule; Take 1 capsule by mouth 2 (Two) Times a Day.      Acute dysuria. Urine w/ leukocytes and cloudy. She will have urine sent for culture. Empyric treatment with keflex until results availble. If neg culture w/ continued symptoms consider ct if needed. She did have a neg ct this summer and is s/p hysterectomy bso. She will increase hydration w water. Increase vaginal hydration. Cranberry tabs. F/u prn.

## 2023-09-27 LAB
BACTERIA UR CULT: NO GROWTH
BACTERIA UR CULT: NORMAL

## 2023-10-06 ENCOUNTER — OFFICE VISIT (OUTPATIENT)
Dept: INTERNAL MEDICINE | Facility: CLINIC | Age: 68
End: 2023-10-06
Payer: COMMERCIAL

## 2023-10-06 VITALS
DIASTOLIC BLOOD PRESSURE: 68 MMHG | SYSTOLIC BLOOD PRESSURE: 124 MMHG | HEIGHT: 63 IN | WEIGHT: 134 LBS | HEART RATE: 75 BPM | OXYGEN SATURATION: 98 % | BODY MASS INDEX: 23.74 KG/M2

## 2023-10-06 DIAGNOSIS — R30.9 PAINFUL URINATION: Primary | ICD-10-CM

## 2023-10-06 LAB
BILIRUB BLD-MCNC: NEGATIVE MG/DL
CLARITY, POC: CLEAR
COLOR UR: YELLOW
EXPIRATION DATE: ABNORMAL
GLUCOSE UR STRIP-MCNC: NEGATIVE MG/DL
KETONES UR QL: ABNORMAL
LEUKOCYTE EST, POC: NEGATIVE
Lab: ABNORMAL
NITRITE UR-MCNC: NEGATIVE MG/ML
PH UR: 6.5 [PH] (ref 5–8)
PROT UR STRIP-MCNC: NEGATIVE MG/DL
RBC # UR STRIP: NEGATIVE /UL
SP GR UR: 1.02 (ref 1–1.03)
UROBILINOGEN UR QL: ABNORMAL

## 2023-10-06 PROCEDURE — 81003 URINALYSIS AUTO W/O SCOPE: CPT | Performed by: INTERNAL MEDICINE

## 2023-10-06 PROCEDURE — 99213 OFFICE O/P EST LOW 20 MIN: CPT | Performed by: INTERNAL MEDICINE

## 2023-10-06 NOTE — PROGRESS NOTES
Subjective     Becca Alba is a 67 y.o. female who presents with   Chief Complaint   Patient presents with    Difficulty Urinating       Difficulty Urinating  Pertinent negatives include no fever.      Patient presents with persistent pain with urination.  No fever/chills.  No abdominal pain.  No vaginal discharge/pain/bleeding.      Review of Systems   Constitutional:  Negative for fever.   Respiratory: Negative.     Cardiovascular: Negative.    Gastrointestinal: Negative.    Genitourinary:  Positive for difficulty urinating and dysuria. Negative for decreased urine volume, frequency, hematuria, urgency, vaginal bleeding, vaginal discharge and vaginal pain.     The following portions of the patient's history were reviewed and updated as appropriate: allergies, current medications and problem list.    Patient Active Problem List    Diagnosis Date Noted    Colon polyps 07/06/2023    Diverticulosis 07/06/2023    Screening for colon cancer 06/20/2023     Note Last Updated: 6/20/2023     Added automatically from request for surgery [id number]      Family history of colon cancer 06/20/2023     Note Last Updated: 6/20/2023     Added automatically from request for surgery [id number]      Vegetarian diet:  CHECK B12 12/09/2022    History of hysterectomy with bilateral oophorectomy 06/13/2022     Note Last Updated: 11/23/2022     Formatting of this note might be different from the original.  06/06/22 [facility]      Long-term current use of tamoxifen 04/04/2022    S/P bilateral mastectomy 03/26/2021     Note Last Updated: 11/23/2022     Formatting of this note might be different from the original.  5/17/2021 Bilateral Mastectomy      Cancer of right breast, stage 1, estrogen receptor positive 02/17/2021    Other insomnia 11/25/2019    Hyperlipidemia 04/25/2016     Note Last Updated: 11/25/2019     2.9% ASCVD risk on 11/25/2019      Leukopenia 04/25/2016     Note Last Updated: 4/25/2016     Description: chronic s/p CBC  "evaluation.      Osteoporosis 04/25/2016     Note Last Updated: 12/7/2021     Fosamax caused stomach upset.  Reclast given for five years.        Uncomplicated asthma 04/25/2016       Current Outpatient Medications on File Prior to Visit   Medication Sig Dispense Refill    acetaminophen (TYLENOL) 500 MG tablet Take 2 tablets by mouth Every 6 (Six) Hours As Needed for Mild Pain.      anastrozole (ARIMIDEX) 1 MG tablet Take 1 tablet by mouth Daily.      beclomethasone (Qvar) 40 MCG/ACT inhaler Inhale 2 puffs 2 (Two) Times a Day. 8.7 each 2    cephalexin (Keflex) 500 MG capsule Take 1 capsule by mouth 2 (Two) Times a Day. 14 capsule 0    ibuprofen (ADVIL,MOTRIN) 200 MG tablet Take 1 tablet by mouth Every 6 (Six) Hours As Needed for Mild Pain.      LORazepam (ATIVAN) 0.5 MG tablet TAKE ONE TABLET BY MOUTH EVERY 8 HOURS AS NEEDED FOR ANXIETY 90 tablet 0    meloxicam (MOBIC) 15 MG tablet TAKE ONE TABLET BY MOUTH DAILY (Patient taking differently: Take 1 tablet by mouth Daily As Needed.) 90 tablet 2    Multiple Vitamins-Minerals (ICAPS AREDS 2 PO) Take 1 capsule by mouth 2 (Two) Times a Day.      saccharomyces boulardii (Florastor) 250 MG capsule Take 1 capsule by mouth 2 (Two) Times a Day. 20 capsule 0    valACYclovir (VALTREX) 500 MG tablet Take 1 tablet by mouth 2 (Two) Times a Day. 30 tablet 5    VITAMIN D PO Take 1 capsule by mouth Daily.       No current facility-administered medications on file prior to visit.       Objective     /68   Pulse 75   Ht 160 cm (62.99\")   Wt 60.8 kg (134 lb)   SpO2 98%   BMI 23.74 kg/m²     Physical Exam  Constitutional:       Appearance: She is well-developed.   HENT:      Head: Normocephalic and atraumatic.   Pulmonary:      Effort: Pulmonary effort is normal.   Abdominal:      General: There is no distension.      Palpations: Abdomen is soft. There is no mass.      Tenderness: There is no abdominal tenderness. There is no guarding or rebound.   Neurological:      Mental " Status: She is alert.       Assessment & Plan   Diagnoses and all orders for this visit:    1. Painful urination (Primary)  -     POC Urinalysis Dipstick, Automated  -     Urine Culture - Urine, Urine, Clean Catch        Discussion    Patient presents with persistent dysuria.  Urine dip in the office is negative.  Last culture was negative.  Treat based on culture.  In the interim she can try pyridum and cystex cranberry.  Urology appointment if symptoms persists.         Future Appointments   Date Time Provider Department Center   11/22/2023  9:10 AM [facility]   12/11/2023  7:45 AM Shefali Back MD [facility]

## 2023-10-26 DIAGNOSIS — F41.9 ANXIETY: ICD-10-CM

## 2023-10-27 RX ORDER — LORAZEPAM 0.5 MG/1
TABLET ORAL
Qty: 30 TABLET | Refills: 0 | Status: SHIPPED | OUTPATIENT
Start: 2023-10-27

## 2023-11-06 ENCOUNTER — OFFICE VISIT (OUTPATIENT)
Dept: INTERNAL MEDICINE | Facility: CLINIC | Age: 68
End: 2023-11-06
Payer: COMMERCIAL

## 2023-11-06 VITALS
WEIGHT: 137 LBS | HEART RATE: 72 BPM | BODY MASS INDEX: 24.27 KG/M2 | OXYGEN SATURATION: 95 % | DIASTOLIC BLOOD PRESSURE: 62 MMHG | SYSTOLIC BLOOD PRESSURE: 98 MMHG

## 2023-11-06 DIAGNOSIS — R30.0 DYSURIA: Primary | ICD-10-CM

## 2023-11-06 LAB
BILIRUB BLD-MCNC: NEGATIVE MG/DL
CLARITY, POC: CLEAR
COLOR UR: ABNORMAL
EXPIRATION DATE: ABNORMAL
GLUCOSE UR STRIP-MCNC: NEGATIVE MG/DL
KETONES UR QL: NEGATIVE
LEUKOCYTE EST, POC: NEGATIVE
Lab: ABNORMAL
NITRITE UR-MCNC: NEGATIVE MG/ML
PH UR: 8.5 [PH] (ref 5–8)
PROT UR STRIP-MCNC: NEGATIVE MG/DL
RBC # UR STRIP: NEGATIVE /UL
SP GR UR: 1.01 (ref 1–1.03)
UROBILINOGEN UR QL: ABNORMAL

## 2023-11-06 PROCEDURE — 81003 URINALYSIS AUTO W/O SCOPE: CPT | Performed by: INTERNAL MEDICINE

## 2023-11-06 PROCEDURE — 99213 OFFICE O/P EST LOW 20 MIN: CPT | Performed by: INTERNAL MEDICINE

## 2023-11-06 NOTE — PROGRESS NOTES
Subjective     Becca Alba is a 67 y.o. female who presents with   Chief Complaint   Patient presents with    Difficulty Urinating       Difficulty Urinating  Pertinent negatives include no fever.        C/o dysuria.  Going on one day.  Frequency is associated.  No abdominal pain.  No back pain.      Review of Systems   Constitutional:  Negative for fever.   Respiratory: Negative.     Cardiovascular: Negative.    Genitourinary:  Positive for difficulty urinating.       The following portions of the patient's history were reviewed and updated as appropriate: allergies, current medications and problem list.    Patient Active Problem List    Diagnosis Date Noted    Colon polyps 07/06/2023    Diverticulosis 07/06/2023    Screening for colon cancer 06/20/2023     Note Last Updated: 6/20/2023     Added automatically from request for surgery [id number]      Family history of colon cancer 06/20/2023     Note Last Updated: 6/20/2023     Added automatically from request for surgery [id number]      Vegetarian diet:  CHECK B12 12/09/2022    History of hysterectomy with bilateral oophorectomy 06/13/2022     Note Last Updated: 11/23/2022     Formatting of this note might be different from the original.  06/06/22 [facility]      Long-term current use of tamoxifen 04/04/2022    S/P bilateral mastectomy 03/26/2021     Note Last Updated: 11/23/2022     Formatting of this note might be different from the original.  5/17/2021 Bilateral Mastectomy      Cancer of right breast, stage 1, estrogen receptor positive 02/17/2021    Other insomnia 11/25/2019    Hyperlipidemia 04/25/2016     Note Last Updated: 11/25/2019     2.9% ASCVD risk on 11/25/2019      Leukopenia 04/25/2016     Note Last Updated: 4/25/2016     Description: chronic s/p CBC evaluation.      Osteoporosis 04/25/2016     Note Last Updated: 12/7/2021     Fosamax caused stomach upset.  Reclast given for five years.        Uncomplicated asthma 04/25/2016       Current Outpatient  Medications on File Prior to Visit   Medication Sig Dispense Refill    acetaminophen (TYLENOL) 500 MG tablet Take 2 tablets by mouth Every 6 (Six) Hours As Needed for Mild Pain.      anastrozole (ARIMIDEX) 1 MG tablet Take 1 tablet by mouth Daily.      beclomethasone (Qvar) 40 MCG/ACT inhaler Inhale 2 puffs 2 (Two) Times a Day. 8.7 each 2    LORazepam (ATIVAN) 0.5 MG tablet TAKE ONE TABLET BY MOUTH EVERY 8 HOURS AS NEEDED FOR ANXIETY 30 tablet 0    meloxicam (MOBIC) 15 MG tablet TAKE ONE TABLET BY MOUTH DAILY (Patient taking differently: Take 1 tablet by mouth Daily As Needed.) 90 tablet 2    Multiple Vitamins-Minerals (ICAPS AREDS 2 PO) Take 1 capsule by mouth 2 (Two) Times a Day.      valACYclovir (VALTREX) 500 MG tablet Take 1 tablet by mouth 2 (Two) Times a Day. 30 tablet 5    VITAMIN D PO Take 1 capsule by mouth Daily.      [DISCONTINUED] saccharomyces boulardii (Florastor) 250 MG capsule Take 1 capsule by mouth 2 (Two) Times a Day. 20 capsule 0    ibuprofen (ADVIL,MOTRIN) 200 MG tablet Take 1 tablet by mouth Every 6 (Six) Hours As Needed for Mild Pain.      [DISCONTINUED] cephalexin (Keflex) 500 MG capsule Take 1 capsule by mouth 2 (Two) Times a Day. 14 capsule 0     No current facility-administered medications on file prior to visit.       Objective     BP 98/62   Pulse 72   Wt 62.1 kg (137 lb)   SpO2 95%   BMI 24.27 kg/m²     Physical Exam  Constitutional:       Appearance: She is well-developed.   HENT:      Head: Normocephalic and atraumatic.   Pulmonary:      Effort: Pulmonary effort is normal.   Abdominal:      General: There is no distension.      Palpations: Abdomen is soft. There is no mass.      Tenderness: There is no abdominal tenderness. There is no guarding or rebound.   Neurological:      Mental Status: She is alert.         Assessment & Plan   Diagnoses and all orders for this visit:    1. Dysuria (Primary)  -     POC Urinalysis Dipstick, Automated  -     Urine Culture - Urine, Urine, Clean  Catch        Discussion    Patient presents with dysuria.  Better now.  Urine dip is negative.  Will send for culture.  Call with any symptoms changes.         Future Appointments   Date Time Provider Department Center   11/22/2023  9:10 AM [facility]   12/11/2023  7:45 AM Shefali Back MD [facility]

## 2023-11-08 LAB
BACTERIA UR CULT: NO GROWTH
BACTERIA UR CULT: NORMAL

## 2023-11-16 DIAGNOSIS — Z00.00 HEALTH MAINTENANCE EXAMINATION: Primary | ICD-10-CM

## 2023-11-16 DIAGNOSIS — Z78.9 VEGETARIAN DIET: ICD-10-CM

## 2023-11-16 DIAGNOSIS — M81.0 OSTEOPOROSIS, UNSPECIFIED OSTEOPOROSIS TYPE, UNSPECIFIED PATHOLOGICAL FRACTURE PRESENCE: ICD-10-CM

## 2023-11-22 LAB
25(OH)D3+25(OH)D2 SERPL-MCNC: 68.3 NG/ML (ref 30–100)
ALBUMIN SERPL-MCNC: 4.3 G/DL (ref 3.5–5.2)
ALBUMIN/GLOB SERPL: 2 G/DL
ALP SERPL-CCNC: 51 U/L (ref 39–117)
ALT SERPL-CCNC: 12 U/L (ref 1–33)
APPEARANCE UR: CLEAR
AST SERPL-CCNC: 25 U/L (ref 1–32)
BACTERIA #/AREA URNS HPF: NORMAL /HPF
BASOPHILS # BLD AUTO: 0.04 10*3/MM3 (ref 0–0.2)
BASOPHILS NFR BLD AUTO: 1.6 % (ref 0–1.5)
BILIRUB SERPL-MCNC: 0.6 MG/DL (ref 0–1.2)
BILIRUB UR QL STRIP: NEGATIVE
BUN SERPL-MCNC: 21 MG/DL (ref 8–23)
BUN/CREAT SERPL: 28 (ref 7–25)
CALCIUM SERPL-MCNC: 9.2 MG/DL (ref 8.6–10.5)
CASTS URNS MICRO: NORMAL
CHLORIDE SERPL-SCNC: 104 MMOL/L (ref 98–107)
CHOLEST SERPL-MCNC: 197 MG/DL (ref 0–200)
CO2 SERPL-SCNC: 28 MMOL/L (ref 22–29)
COLOR UR: ABNORMAL
CREAT SERPL-MCNC: 0.75 MG/DL (ref 0.57–1)
EGFRCR SERPLBLD CKD-EPI 2021: 88.5 ML/MIN/1.73
EOSINOPHIL # BLD AUTO: 0.09 10*3/MM3 (ref 0–0.4)
EOSINOPHIL NFR BLD AUTO: 3.6 % (ref 0.3–6.2)
EPI CELLS #/AREA URNS HPF: NORMAL /HPF
ERYTHROCYTE [DISTWIDTH] IN BLOOD BY AUTOMATED COUNT: 12.6 % (ref 12.3–15.4)
GLOBULIN SER CALC-MCNC: 2.1 GM/DL
GLUCOSE SERPL-MCNC: 100 MG/DL (ref 65–99)
GLUCOSE UR QL STRIP: NEGATIVE
HCT VFR BLD AUTO: 39.4 % (ref 34–46.6)
HDLC SERPL-MCNC: 55 MG/DL (ref 40–60)
HGB BLD-MCNC: 13.2 G/DL (ref 12–15.9)
HGB UR QL STRIP: NEGATIVE
IMM GRANULOCYTES # BLD AUTO: 0 10*3/MM3 (ref 0–0.05)
IMM GRANULOCYTES NFR BLD AUTO: 0 % (ref 0–0.5)
KETONES UR QL STRIP: NEGATIVE
LDLC SERPL CALC-MCNC: 126 MG/DL (ref 0–100)
LEUKOCYTE ESTERASE UR QL STRIP: ABNORMAL
LYMPHOCYTES # BLD AUTO: 0.95 10*3/MM3 (ref 0.7–3.1)
LYMPHOCYTES NFR BLD AUTO: 38.2 % (ref 19.6–45.3)
MCH RBC QN AUTO: 27.9 PG (ref 26.6–33)
MCHC RBC AUTO-ENTMCNC: 33.5 G/DL (ref 31.5–35.7)
MCV RBC AUTO: 83.3 FL (ref 79–97)
MONOCYTES # BLD AUTO: 0.23 10*3/MM3 (ref 0.1–0.9)
MONOCYTES NFR BLD AUTO: 9.2 % (ref 5–12)
MUCOUS THREADS URNS QL MICRO: NORMAL /HPF
NEUTROPHILS # BLD AUTO: 1.18 10*3/MM3 (ref 1.7–7)
NEUTROPHILS NFR BLD AUTO: 47.4 % (ref 42.7–76)
NITRITE UR QL STRIP: NEGATIVE
NRBC BLD AUTO-RTO: 0 /100 WBC (ref 0–0.2)
PH UR STRIP: 6.5 [PH] (ref 5–8)
PLATELET # BLD AUTO: 183 10*3/MM3 (ref 140–450)
POTASSIUM SERPL-SCNC: 4.4 MMOL/L (ref 3.5–5.2)
PROT SERPL-MCNC: 6.4 G/DL (ref 6–8.5)
PROT UR QL STRIP: NEGATIVE
RBC # BLD AUTO: 4.73 10*6/MM3 (ref 3.77–5.28)
RBC #/AREA URNS HPF: NORMAL /HPF
SODIUM SERPL-SCNC: 140 MMOL/L (ref 136–145)
SP GR UR STRIP: 1.02 (ref 1–1.03)
TRIGL SERPL-MCNC: 90 MG/DL (ref 0–150)
TSH SERPL DL<=0.005 MIU/L-ACNC: 1.64 UIU/ML (ref 0.27–4.2)
UROBILINOGEN UR STRIP-MCNC: ABNORMAL MG/DL
VIT B12 SERPL-MCNC: 704 PG/ML (ref 211–946)
VLDLC SERPL CALC-MCNC: 16 MG/DL (ref 5–40)
WBC # BLD AUTO: 2.49 10*3/MM3 (ref 3.4–10.8)
WBC #/AREA URNS HPF: NORMAL /HPF

## 2023-11-24 LAB
BACTERIA UR CULT: NORMAL
BACTERIA UR CULT: NORMAL

## 2023-11-29 ENCOUNTER — TELEPHONE (OUTPATIENT)
Dept: INTERNAL MEDICINE | Facility: CLINIC | Age: 68
End: 2023-11-29
Payer: COMMERCIAL

## 2023-11-29 NOTE — TELEPHONE ENCOUNTER
Caller: Becca Alba    Relationship: Self    Best call back number: [phone]    What was the call regarding: PATIENT STATED THAT THEY WOULD LIKE TO HAVE A COPY OF THE RESULTS FROM HER BLOODWORK TO BE FAXED TO DR. OLGUIN AT THE [facility]. PLEASE CALL AND ADVISE WHETHER OR NOT WE WILL SEND SO PATIENT CAN NOTIFY THE CANCER CENTER    FAX: [phone] ATTN DR. MARY OLGUIN

## 2023-12-11 ENCOUNTER — OFFICE VISIT (OUTPATIENT)
Dept: INTERNAL MEDICINE | Facility: CLINIC | Age: 68
End: 2023-12-11
Payer: COMMERCIAL

## 2023-12-11 VITALS
WEIGHT: 136 LBS | DIASTOLIC BLOOD PRESSURE: 70 MMHG | HEART RATE: 74 BPM | OXYGEN SATURATION: 96 % | SYSTOLIC BLOOD PRESSURE: 112 MMHG | BODY MASS INDEX: 24.1 KG/M2 | HEIGHT: 63 IN

## 2023-12-11 DIAGNOSIS — Z00.00 WELL ADULT EXAM: Primary | ICD-10-CM

## 2023-12-11 DIAGNOSIS — F41.9 ANXIETY: ICD-10-CM

## 2023-12-11 PROBLEM — Z80.0 FAMILY HISTORY OF COLON CANCER: Status: RESOLVED | Noted: 2023-06-20 | Resolved: 2023-12-11

## 2023-12-11 PROBLEM — Z12.11 SCREENING FOR COLON CANCER: Status: RESOLVED | Noted: 2023-06-20 | Resolved: 2023-12-11

## 2023-12-11 PROCEDURE — 99397 PER PM REEVAL EST PAT 65+ YR: CPT | Performed by: INTERNAL MEDICINE

## 2023-12-11 RX ORDER — LORAZEPAM 0.5 MG/1
0.5 TABLET ORAL EVERY 8 HOURS PRN
Qty: 90 TABLET | Refills: 0 | Status: SHIPPED | OUTPATIENT
Start: 2023-12-11

## 2023-12-11 NOTE — PROGRESS NOTES
Subjective     Becca Alba is a 67 y.o. female who presents for a complete physical exam.      History of Present Illness     The following data was reviewed by: Shefali Back MD on 12/11/2023:  Common labs          6/8/2023    08:27 11/22/2023    08:04   Common Labs   Glucose 95  100    BUN 18  21    Creatinine 0.80  0.75    Sodium 139  140    Potassium 4.1  4.4    Chloride 103  104    Calcium 8.8  9.2    Total Protein 6.5  6.4    Albumin 4.2  4.3    Total Bilirubin 0.5  0.6    Alkaline Phosphatase 38  51    AST (SGOT) 19  25    ALT (SGPT) 12  12    WBC 2.34  2.49    Hemoglobin 13.2  13.2    Hematocrit 39.8  39.4    Platelets 159  183    Total Cholesterol 200  197    Triglycerides 99  90    HDL Cholesterol 59  55    LDL Cholesterol  123  126      HLD.  Control is fair with healthy diet.  Insomnia.  Chronic.  On ativan as needed.        Review of Systems   Constitutional: Negative.    HENT: Negative.     Eyes: Negative.    Respiratory: Negative.     Cardiovascular: Negative.    Gastrointestinal: Negative.    Endocrine: Negative.    Genitourinary: Negative.    Musculoskeletal: Negative.  Positive for arthralgias.   Skin: Negative.    Allergic/Immunologic: Negative.    Neurological: Negative.    Hematological: Negative.    Psychiatric/Behavioral: Negative.         The following portions of the patient's history were reviewed and updated as appropriate: allergies, current medications, past family history, past medical history, past social history, past surgical history and problem list.  Health maintenance tab was reviewed and updated with the patient.       Patient Active Problem List    Diagnosis Date Noted    Colon polyps 07/06/2023    Diverticulosis 07/06/2023    Vegetarian diet:  CHECK B12 12/09/2022    History of hysterectomy with bilateral oophorectomy 06/13/2022     Note Last Updated: 11/23/2022     Formatting of this note might be different from the original.  06/06/22 [facility]      Long-term  current use of tamoxifen 04/04/2022    S/P bilateral mastectomy 03/26/2021     Note Last Updated: 11/23/2022     Formatting of this note might be different from the original.  5/17/2021 Bilateral Mastectomy      Cancer of right breast, stage 1, estrogen receptor positive 02/17/2021    Other insomnia 11/25/2019    Hyperlipidemia 04/25/2016     Note Last Updated: 11/25/2019     2.9% ASCVD risk on 11/25/2019      Leukopenia 04/25/2016     Note Last Updated: 4/25/2016     Description: chronic s/p CBC evaluation.      Osteoporosis 04/25/2016     Note Last Updated: 12/7/2021     Fosamax caused stomach upset.  Reclast given for five years.        Uncomplicated asthma 04/25/2016       Past Medical History:   Diagnosis Date    Allergic rhinitis     Anxiety     Arthritis     hip & joints thumb    Atypical chest pain     Chronic leukopenia     COVID 01/2022    Depression     H/O bone density study 12/21/2015    H/O mammogram 12/29/2015    History of EKG 11/09/2015    History of herpes genitalis     History of mononucleosis     Hyperlipidemia     IBS (irritable bowel syndrome)     high school    Osteopenia     Rib pain on left side     Tinnitus        Past Surgical History:   Procedure Laterality Date    BELPHAROPTOSIS REPAIR Bilateral     BLADDER SURGERY      BREAST LUMPECTOMY Right 03/2021    CATARACT EXTRACTION EXTRACAPSULAR W/ INTRAOCULAR LENS IMPLANTATION Bilateral     COLONOSCOPY  07/20/2012    COLONOSCOPY N/A 7/6/2023    Procedure: COLONOSCOPY to cecum with cold biopsy polypectomy;  Surgeon: Jaky Yanes MD;  Location: [facility] ENDOSCOPY;  Service: General;  Laterality: N/A;  pre - screening, family h/o colon cancer  post - colon polyp, diverticulosis, poor prep    ENDOSCOPY      LAPAROSCOPIC VAGINAL HYSTERECTOMY      MASTECTOMY Bilateral 05/2021    lymph node on right side removed    NEUROMA SURGERY  12/18/2014    SALPINGO OOPHORECTOMY Bilateral        Family History   Problem Relation Age of Onset    Diabetes type  II Mother     Osteoarthritis Mother     Macular degeneration Father     Myelodysplastic syndrome Father     Celiac disease Daughter     Heart disease Maternal Grandmother     Lung cancer Maternal Grandmother     Colon cancer Maternal Grandmother     Heart disease Maternal Grandfather     Malig Hyperthermia Neg Hx        Social History     Socioeconomic History    Marital status:    Tobacco Use    Smoking status: Former     Years: 5     Types: Cigarettes     Quit date:      Years since quittin.9    Smokeless tobacco: Never   Vaping Use    Vaping Use: Never used   Substance and Sexual Activity    Alcohol use: Yes     Alcohol/week: 2.0 standard drinks of alcohol     Types: 1 Glasses of wine, 1 Shots of liquor per week    Drug use: Never    Sexual activity: Not Currently     Partners: Male     Birth control/protection: Abstinence       Current Outpatient Medications on File Prior to Visit   Medication Sig Dispense Refill    acetaminophen (TYLENOL) 500 MG tablet Take 2 tablets by mouth Every 6 (Six) Hours As Needed for Mild Pain.      anastrozole (ARIMIDEX) 1 MG tablet Take 1 tablet by mouth Daily.      beclomethasone (Qvar) 40 MCG/ACT inhaler Inhale 2 puffs 2 (Two) Times a Day. 8.7 each 2    ibuprofen (ADVIL,MOTRIN) 200 MG tablet Take 1 tablet by mouth Every 6 (Six) Hours As Needed for Mild Pain.      meloxicam (MOBIC) 15 MG tablet TAKE ONE TABLET BY MOUTH DAILY (Patient taking differently: Take 1 tablet by mouth Daily As Needed.) 90 tablet 2    valACYclovir (VALTREX) 500 MG tablet Take 1 tablet by mouth 2 (Two) Times a Day. 30 tablet 5    VITAMIN D PO Take 1 capsule by mouth Daily.      [DISCONTINUED] LORazepam (ATIVAN) 0.5 MG tablet TAKE ONE TABLET BY MOUTH EVERY 8 HOURS AS NEEDED FOR ANXIETY 30 tablet 0    [DISCONTINUED] Multiple Vitamins-Minerals (ICAPS AREDS 2 PO) Take 1 capsule by mouth 2 (Two) Times a Day. (Patient not taking: Reported on 2023)       No current facility-administered  "medications on file prior to visit.       Allergies   Allergen Reactions    Codeine Other (See Comments)     \"Crazy\"    Hydrocodone Other (See Comments)     \"Crazy\"    Nitrofurantoin Nausea Only    Amoxicillin-Pot Clavulanate GI Intolerance    Pneumovax [Pneumococcal Polysaccharide Vaccine] Rash       Immunization History   Administered Date(s) Administered    COVID-19 (MODERNA) 1st,2nd,3rd Dose Monovalent 02/03/2021, 03/03/2021, 09/03/2021    COVID-19 (UNSPECIFIED) 02/03/2021, 03/03/2021, 09/03/2021    COVID-19 F23 (PFIZER) 12YRS+ (COMIRNATY) 12/09/2023    Covid-19 (Pfizer) Gray Cap Monovalent 11/25/2022    Fluzone (or Fluarix & Flulaval for VFC) >6mos 11/21/2018, 10/18/2019, 09/14/2020, 11/11/2022    Fluzone High-Dose 65+yrs 09/25/2023    Fluzone Quad >6mos (Multi-dose) 11/14/2016, 11/20/2017    Hepatitis A 05/04/2018, 11/21/2018    Influenza Quad Vaccine (Inpatient) 11/20/2017    Influenza TIV (IM) 01/01/2014, 11/09/2015    Influenza, Unspecified 11/21/2018, 10/18/2019, 09/14/2020, 11/11/2022    Pneumococcal Polysaccharide (PPSV23) 11/20/2017    Shingrix 03/25/2019, 08/18/2019, 09/01/2019, 09/01/2019, 10/01/2019    Tdap 07/22/2008, 12/27/2018    flucelvax quad pfs =>4 YRS 11/21/2018, 10/18/2019       Objective     /70   Pulse 74   Ht 160 cm (62.99\")   Wt 61.7 kg (136 lb)   SpO2 96%   BMI 24.10 kg/m²     Physical Exam  Constitutional:       Appearance: She is well-developed.   HENT:      Head: Normocephalic and atraumatic.      Right Ear: Hearing, tympanic membrane and external ear normal.      Left Ear: Hearing, tympanic membrane and external ear normal.      Nose: Nose normal.   Neck:      Thyroid: No thyromegaly.   Cardiovascular:      Rate and Rhythm: Normal rate and regular rhythm.      Heart sounds: Normal heart sounds. No murmur heard.  Pulmonary:      Effort: Pulmonary effort is normal.      Breath sounds: Normal breath sounds.   Abdominal:      General: There is no distension.      Palpations: " Abdomen is soft.      Tenderness: There is no abdominal tenderness.   Musculoskeletal:      Cervical back: Neck supple.   Lymphadenopathy:      Cervical: No cervical adenopathy.   Skin:     General: Skin is warm and dry.   Neurological:      Mental Status: She is alert and oriented to person, place, and time.   Psychiatric:         Speech: Speech normal.         Behavior: Behavior normal.         Thought Content: Thought content normal.         Judgment: Judgment normal.         Assessment & Plan   Diagnoses and all orders for this visit:    1. Well adult exam (Primary)    2. Anxiety  -     LORazepam (ATIVAN) 0.5 MG tablet; Take 1 tablet by mouth Every 8 (Eight) Hours As Needed for Anxiety. for anxiety  Dispense: 90 tablet; Refill: 0  - [id number]+Oxycodone+Crt-Unbund - Urine, Clean Catch        Discussion    Patient presents today for a CPE.      Patient follows a healthy diet.   Patient follows an adequate exercise regimen. Mammogram is not indicated. Colon cancer screening is up to date.   Pap smear no longer performed secondary to hysterectomy.   DEXA is up to date.   Discussed importance of preventative care including vaccinations, age appropriate cancer screening, routine lab work, healthy diet, and active lifestyle.  Update contract for lorazepam, contract is reviewed, UDS is ordered.          Health Maintenance   Topic Date Due    ANNUAL PHYSICAL  12/09/2023    LIPID PANEL  11/22/2024    DXA SCAN  06/09/2025    TDAP/TD VACCINES (3 - Td or Tdap) 12/27/2028    COLORECTAL CANCER SCREENING  07/06/2033    HEPATITIS C SCREENING  Completed    COVID-19 Vaccine  Completed    INFLUENZA VACCINE  Completed    ZOSTER VACCINE  Completed    Pneumococcal Vaccine 65+  Discontinued            No future appointments.

## 2023-12-12 LAB
ACCEPTABLE CREAT UR QL: 66.5 MG/DL (ref 20–300)
AMPHETAMINES UR QL SCN: NEGATIVE NG/ML
BARBITURATES UR QL SCN: NEGATIVE NG/ML
BENZODIAZ UR QL SCN: NEGATIVE NG/ML
COCAINE UR QL SCN: NEGATIVE NG/ML
METHADONE UR QL SCN: NEGATIVE NG/ML
OPIATES UR QL SCN: NEGATIVE NG/ML
OXYCODONE+OXYMORPHONE UR QL SCN: NEGATIVE NG/ML
PCP UR QL SCN: NEGATIVE NG/ML
PH UR: 8.4 [PH] (ref 4.5–8.9)
PROPOXYPH UR QL SCN: NEGATIVE NG/ML

## 2024-02-20 ENCOUNTER — TELEPHONE (OUTPATIENT)
Dept: INTERNAL MEDICINE | Facility: CLINIC | Age: 69
End: 2024-02-20
Payer: COMMERCIAL

## 2024-02-20 NOTE — TELEPHONE ENCOUNTER
Scheduled patient for appointment next week. Told her to get a brace to stabilize her thumb until seen.

## 2024-02-20 NOTE — TELEPHONE ENCOUNTER
Caller: Becca Alba    Relationship to patient: Self    Best call back number:[phone] (Mobile)     Patient is needing: PATIENT CALLED TO REQUEST A CALLBACK TO DISCUSS HER TRIGGER FINGER.         THANKS

## 2024-02-27 ENCOUNTER — OFFICE VISIT (OUTPATIENT)
Dept: INTERNAL MEDICINE | Facility: CLINIC | Age: 69
End: 2024-02-27
Payer: COMMERCIAL

## 2024-02-27 VITALS
HEIGHT: 63 IN | OXYGEN SATURATION: 98 % | BODY MASS INDEX: 24.1 KG/M2 | WEIGHT: 136 LBS | DIASTOLIC BLOOD PRESSURE: 74 MMHG | SYSTOLIC BLOOD PRESSURE: 110 MMHG | HEART RATE: 67 BPM

## 2024-02-27 DIAGNOSIS — M65.312 TRIGGER FINGER OF LEFT THUMB: Primary | ICD-10-CM

## 2024-02-27 PROCEDURE — 99213 OFFICE O/P EST LOW 20 MIN: CPT | Performed by: INTERNAL MEDICINE

## 2024-02-27 RX ORDER — METHYLPREDNISOLONE 4 MG/1
TABLET ORAL
Qty: 21 TABLET | Refills: 0 | Status: SHIPPED | OUTPATIENT
Start: 2024-02-27

## 2024-02-29 NOTE — PROGRESS NOTES
Subjective     Becca Alba is a 68 y.o. female who presents with   Chief Complaint   Patient presents with    Hand Pain       Hand Pain          C/o painful joints.  She feels related to being on Arimidex.  Triggering of the left thumb has started as well.      Review of Systems   Respiratory: Negative.     Cardiovascular: Negative.        The following portions of the patient's history were reviewed and updated as appropriate: allergies, current medications and problem list.    Patient Active Problem List    Diagnosis Date Noted    Colon polyps 07/06/2023    Diverticulosis 07/06/2023    Vegetarian diet:  CHECK B12 12/09/2022    History of hysterectomy with bilateral oophorectomy 06/13/2022     Note Last Updated: 11/23/2022     Formatting of this note might be different from the original.  06/06/22 [facility]      Long-term current use of tamoxifen 04/04/2022    S/P bilateral mastectomy 03/26/2021     Note Last Updated: 11/23/2022     Formatting of this note might be different from the original.  5/17/2021 Bilateral Mastectomy      Cancer of right breast, stage 1, estrogen receptor positive 02/17/2021    Other insomnia 11/25/2019    Hyperlipidemia 04/25/2016     Note Last Updated: 11/25/2019     2.9% ASCVD risk on 11/25/2019      Leukopenia 04/25/2016     Note Last Updated: 4/25/2016     Description: chronic s/p CBC evaluation.      Osteoporosis 04/25/2016     Note Last Updated: 12/7/2021     Fosamax caused stomach upset.  Reclast given for five years.        Uncomplicated asthma 04/25/2016       Current Outpatient Medications on File Prior to Visit   Medication Sig Dispense Refill    acetaminophen (TYLENOL) 500 MG tablet Take 2 tablets by mouth Every 6 (Six) Hours As Needed for Mild Pain.      anastrozole (ARIMIDEX) 1 MG tablet Take 1 tablet by mouth Daily.      beclomethasone (Qvar) 40 MCG/ACT inhaler Inhale 2 puffs 2 (Two) Times a Day. 8.7 each 2    ibuprofen (ADVIL,MOTRIN) 200 MG tablet Take 1 tablet by  "mouth Every 6 (Six) Hours As Needed for Mild Pain.      LORazepam (ATIVAN) 0.5 MG tablet Take 1 tablet by mouth Every 8 (Eight) Hours As Needed for Anxiety. for anxiety 90 tablet 0    meloxicam (MOBIC) 15 MG tablet TAKE ONE TABLET BY MOUTH DAILY (Patient taking differently: Take 1 tablet by mouth Daily As Needed.) 90 tablet 2    valACYclovir (VALTREX) 500 MG tablet Take 1 tablet by mouth 2 (Two) Times a Day. 30 tablet 5    VITAMIN D PO Take 1 capsule by mouth Daily.       No current facility-administered medications on file prior to visit.       Objective     /74   Pulse 67   Ht 160 cm (62.99\")   Wt 61.7 kg (136 lb)   SpO2 98%   BMI 24.10 kg/m²     Physical Exam  Constitutional:       Appearance: She is well-developed.   HENT:      Head: Normocephalic and atraumatic.   Pulmonary:      Effort: Pulmonary effort is normal.   Musculoskeletal:      Comments: Triggering of left 1st digit.     Neurological:      Mental Status: She is alert and oriented to person, place, and time.   Psychiatric:         Behavior: Behavior normal.         Assessment & Plan   Diagnoses and all orders for this visit:    1. Trigger finger of left thumb (Primary)    Other orders  -     methylPREDNISolone (MEDROL) 4 MG dose pack; Take as directed on package instructions.  Dispense: 21 tablet; Refill: 0        Discussion    Patient presents with joint pain likely related to Anastrozole.  I discussed with the patient a trial of conservative management with:   medrol dose shan and voltaren gel.  Let me know if not feeling better over the next several weeks or if there is any change in symptoms.  I would place referral to hand surgery if not improving.           Future Appointments   Date Time Provider Department Center   6/12/2024  8:00 AM [facility]   11/26/2024  8:00 AM [facility]   12/13/2024  7:45 AM Shefali Back MD [facility]         "

## 2024-03-25 RX ORDER — MELOXICAM 15 MG/1
TABLET ORAL
Qty: 90 TABLET | Refills: 2 | Status: SHIPPED | OUTPATIENT
Start: 2024-03-25

## 2024-04-15 ENCOUNTER — TELEPHONE (OUTPATIENT)
Dept: INTERNAL MEDICINE | Facility: CLINIC | Age: 69
End: 2024-04-15

## 2024-04-15 DIAGNOSIS — M65.312 TRIGGER FINGER OF LEFT THUMB: Primary | ICD-10-CM

## 2024-04-15 NOTE — TELEPHONE ENCOUNTER
Caller: Becca Alba    Relationship: Self    Best call back number: [phone]     Who are you requesting to speak with (clinical staff, provider,  specific staff member): CLINICAL STAFF     What was the call regarding: PATIENT WAS SEEN A FEW WEEKS AGO ABOUT ISSUES WITH THUMB AND WAS STEROID AND THAT DID NOT HELP WANTING TO KNOW WHAT NEXT STEP SHOULD BE SHOULD SHE GO SEE A SPECIALIST PLEASE CALL AND ADVISE

## 2024-04-17 ENCOUNTER — TELEPHONE (OUTPATIENT)
Dept: INTERNAL MEDICINE | Facility: CLINIC | Age: 69
End: 2024-04-17
Payer: COMMERCIAL

## 2024-04-17 NOTE — TELEPHONE ENCOUNTER
Patient first called on 04/15/2024 and there was a miscommunication when the HUB took the call and typed the note. The patient actually wanted to speak to Dr. Back personally about her Hand referral. Please call patient at [phone].

## 2024-05-21 RX ORDER — BECLOMETHASONE DIPROPIONATE HFA 40 UG/1
2 AEROSOL, METERED RESPIRATORY (INHALATION) 2 TIMES DAILY
Qty: 10.6 EACH | Refills: 11 | Status: SHIPPED | OUTPATIENT
Start: 2024-05-21

## 2024-06-04 ENCOUNTER — TELEPHONE (OUTPATIENT)
Dept: INTERNAL MEDICINE | Facility: CLINIC | Age: 69
End: 2024-06-04
Payer: COMMERCIAL

## 2024-06-04 NOTE — TELEPHONE ENCOUNTER
Call and advise. Qvar rejected.  Can I sent in another steroid only inhaler like Asmanex?  JENNIFER

## 2024-06-10 DIAGNOSIS — E78.5 HYPERLIPIDEMIA, UNSPECIFIED HYPERLIPIDEMIA TYPE: Primary | ICD-10-CM

## 2024-06-12 LAB
ALBUMIN SERPL-MCNC: 4.3 G/DL (ref 3.5–5.2)
ALBUMIN/GLOB SERPL: 2.5 G/DL
ALP SERPL-CCNC: 57 U/L (ref 39–117)
ALT SERPL-CCNC: 11 U/L (ref 1–33)
AST SERPL-CCNC: 24 U/L (ref 1–32)
BILIRUB SERPL-MCNC: 0.6 MG/DL (ref 0–1.2)
BUN SERPL-MCNC: 14 MG/DL (ref 8–23)
BUN/CREAT SERPL: 16.5 (ref 7–25)
CALCIUM SERPL-MCNC: 8.9 MG/DL (ref 8.6–10.5)
CHLORIDE SERPL-SCNC: 105 MMOL/L (ref 98–107)
CHOLEST SERPL-MCNC: 167 MG/DL (ref 0–200)
CO2 SERPL-SCNC: 27.6 MMOL/L (ref 22–29)
CREAT SERPL-MCNC: 0.85 MG/DL (ref 0.57–1)
EGFRCR SERPLBLD CKD-EPI 2021: 75.7 ML/MIN/1.73
GLOBULIN SER CALC-MCNC: 1.7 GM/DL
GLUCOSE SERPL-MCNC: 95 MG/DL (ref 65–99)
HDLC SERPL-MCNC: 43 MG/DL (ref 40–60)
LDLC SERPL CALC-MCNC: 103 MG/DL (ref 0–100)
POTASSIUM SERPL-SCNC: 4.2 MMOL/L (ref 3.5–5.2)
PROT SERPL-MCNC: 6 G/DL (ref 6–8.5)
SODIUM SERPL-SCNC: 140 MMOL/L (ref 136–145)
TRIGL SERPL-MCNC: 114 MG/DL (ref 0–150)
VLDLC SERPL CALC-MCNC: 21 MG/DL (ref 5–40)

## 2024-06-26 DIAGNOSIS — F41.9 ANXIETY: ICD-10-CM

## 2024-06-27 RX ORDER — LORAZEPAM 0.5 MG/1
0.5 TABLET ORAL EVERY 8 HOURS PRN
Qty: 90 TABLET | Refills: 0 | Status: SHIPPED | OUTPATIENT
Start: 2024-06-27

## 2024-09-24 DIAGNOSIS — F41.9 ANXIETY: ICD-10-CM

## 2024-09-24 RX ORDER — LORAZEPAM 0.5 MG/1
0.5 TABLET ORAL EVERY 8 HOURS PRN
Qty: 90 TABLET | Refills: 0 | Status: SHIPPED | OUTPATIENT
Start: 2024-09-24

## 2024-11-19 ENCOUNTER — OFFICE VISIT (OUTPATIENT)
Dept: INTERNAL MEDICINE | Facility: CLINIC | Age: 69
End: 2024-11-19
Payer: COMMERCIAL

## 2024-11-19 VITALS
SYSTOLIC BLOOD PRESSURE: 118 MMHG | HEIGHT: 63 IN | BODY MASS INDEX: 23.57 KG/M2 | HEART RATE: 74 BPM | WEIGHT: 133 LBS | DIASTOLIC BLOOD PRESSURE: 68 MMHG | OXYGEN SATURATION: 98 %

## 2024-11-19 DIAGNOSIS — Z78.9 VEGETARIAN DIET: ICD-10-CM

## 2024-11-19 DIAGNOSIS — E78.5 HYPERLIPIDEMIA, UNSPECIFIED HYPERLIPIDEMIA TYPE: ICD-10-CM

## 2024-11-19 DIAGNOSIS — M81.0 OSTEOPOROSIS, UNSPECIFIED OSTEOPOROSIS TYPE, UNSPECIFIED PATHOLOGICAL FRACTURE PRESENCE: ICD-10-CM

## 2024-11-19 DIAGNOSIS — M79.671 RIGHT FOOT PAIN: Primary | ICD-10-CM

## 2024-11-19 PROCEDURE — 99213 OFFICE O/P EST LOW 20 MIN: CPT | Performed by: INTERNAL MEDICINE

## 2024-11-19 RX ORDER — EXEMESTANE 25 MG/1
25 TABLET ORAL DAILY
COMMUNITY
Start: 2024-04-16

## 2024-11-20 NOTE — PROGRESS NOTES
Subjective     Becca Alba is a 68 y.o. female who presents with   Chief Complaint   Patient presents with    Foot Pain       History of Present Illness     C/o foot pain.  Going on two days.  No injury.  Started in middle of the night.  Hurts to walk on it.  No swelling.     Review of Systems   Cardiovascular:  Negative for leg swelling.       The following portions of the patient's history were reviewed and updated as appropriate: allergies, current medications and problem list.    Patient Active Problem List    Diagnosis Date Noted    Colon polyps 07/06/2023    Diverticulosis 07/06/2023    Vegetarian diet:  CHECK B12 12/09/2022    History of hysterectomy with bilateral oophorectomy 06/13/2022     Note Last Updated: 11/23/2022     Formatting of this note might be different from the original.  06/06/22 [facility]      Long-term current use of tamoxifen 04/04/2022    S/P bilateral mastectomy 03/26/2021     Note Last Updated: 11/23/2022     Formatting of this note might be different from the original.  5/17/2021 Bilateral Mastectomy      Cancer of right breast, stage 1, estrogen receptor positive 02/17/2021    Other insomnia 11/25/2019    Hyperlipidemia 04/25/2016     Note Last Updated: 11/25/2019     2.9% ASCVD risk on 11/25/2019      Leukopenia 04/25/2016     Note Last Updated: 4/25/2016     Description: chronic s/p CBC evaluation.      Osteoporosis 04/25/2016     Note Last Updated: 12/7/2021     Fosamax caused stomach upset.  Reclast given for five years.        Uncomplicated asthma 04/25/2016       Current Outpatient Medications on File Prior to Visit   Medication Sig Dispense Refill    acetaminophen (TYLENOL) 500 MG tablet Take 2 tablets by mouth Every 6 (Six) Hours As Needed for Mild Pain.      Beclomethasone Diprop HFA (Qvar RediHaler) 40 MCG/ACT inhaler INHALE TWO PUFFS BY MOUTH TWICE A DAY 10.6 each 11    benzonatate (TESSALON) 200 MG capsule       erythromycin (ROMYCIN) 5 MG/GM ophthalmic ointment  "Apply  to eye(s) as directed by provider Every 4 (Four) Hours While Awake. 1 each 0    exemestane (AROMASIN) 25 MG tablet Take 1 tablet by mouth Daily.      ibuprofen (ADVIL,MOTRIN) 200 MG tablet Take 1 tablet by mouth Every 6 (Six) Hours As Needed for Mild Pain.      LORazepam (ATIVAN) 0.5 MG tablet Take 1 tablet by mouth Every 8 (Eight) Hours As Needed for Anxiety. for anxiety 90 tablet 0    meloxicam (MOBIC) 15 MG tablet TAKE ONE TABLET BY MOUTH DAILY 90 tablet 2    methylPREDNISolone (MEDROL) 4 MG dose pack Take as directed on package instructions. 21 tablet 0    valACYclovir (VALTREX) 500 MG tablet Take 1 tablet by mouth 2 (Two) Times a Day. 30 tablet 5    VITAMIN D PO Take 1 capsule by mouth Daily.       No current facility-administered medications on file prior to visit.       Objective     /68   Pulse 74   Ht 160 cm (62.99\")   Wt 60.3 kg (133 lb)   SpO2 98%   BMI 23.57 kg/m²     Physical Exam  Constitutional:       Appearance: She is well-developed.   HENT:      Head: Normocephalic and atraumatic.   Pulmonary:      Effort: Pulmonary effort is normal.   Musculoskeletal:      Right foot: Normal range of motion. Tenderness and bony tenderness present. No swelling, deformity or crepitus.      Comments: Point tenderness lateral right 5th metatarsal.     Neurological:      Mental Status: She is alert and oriented to person, place, and time.   Psychiatric:         Behavior: Behavior normal.         Assessment & Plan   Diagnoses and all orders for this visit:    1. Right foot pain (Primary)  -     XR Foot 3+ View Right (In Office)    2. Hyperlipidemia, unspecified hyperlipidemia type  -     CBC & Differential; Future  -     Comprehensive Metabolic Panel; Future  -     Lipid Panel; Future  -     TSH Rfx On Abnormal To Free T4; Future  -     Urinalysis With Microscopic - Urine, Clean Catch; Future  -     Vitamin D,25-Hydroxy; Future  -     Vitamin B12; Future    3. Vegetarian diet:  CHECK B12  -     CBC & " Differential; Future  -     Comprehensive Metabolic Panel; Future  -     Lipid Panel; Future  -     TSH Rfx On Abnormal To Free T4; Future  -     Urinalysis With Microscopic - Urine, Clean Catch; Future  -     Vitamin D,25-Hydroxy; Future  -     Vitamin B12; Future    4. Osteoporosis, unspecified osteoporosis type, unspecified pathological fracture presence  -     CBC & Differential; Future  -     Comprehensive Metabolic Panel; Future  -     Lipid Panel; Future  -     TSH Rfx On Abnormal To Free T4; Future  -     Urinalysis With Microscopic - Urine, Clean Catch; Future  -     Vitamin D,25-Hydroxy; Future  -     Vitamin B12; Future        Discussion    Patient presents with right foot pain acutely without injury.  X-ray is unremarkable.  I discussed with the patient a trial of conservative management with:   rest, ice, and voltaren gel.  Let me know if not feeling better over the next 10 days or if there is any change in symptoms.    Labs for upcoming visit ordered.           Future Appointments   Date Time Provider Department Center   11/26/2024  8:00 AM [facility]   12/13/2024  7:45 AM Shefali Back MD [facility]

## 2024-12-13 ENCOUNTER — OFFICE VISIT (OUTPATIENT)
Dept: INTERNAL MEDICINE | Facility: CLINIC | Age: 69
End: 2024-12-13
Payer: COMMERCIAL

## 2024-12-13 VITALS
HEART RATE: 87 BPM | SYSTOLIC BLOOD PRESSURE: 112 MMHG | HEIGHT: 63 IN | DIASTOLIC BLOOD PRESSURE: 60 MMHG | BODY MASS INDEX: 23.74 KG/M2 | OXYGEN SATURATION: 95 % | TEMPERATURE: 98.3 F | WEIGHT: 134 LBS

## 2024-12-13 DIAGNOSIS — F41.9 ANXIETY: ICD-10-CM

## 2024-12-13 DIAGNOSIS — Z00.00 WELL ADULT EXAM: Primary | ICD-10-CM

## 2024-12-13 PROBLEM — Z79.810 LONG-TERM CURRENT USE OF TAMOXIFEN: Status: RESOLVED | Noted: 2022-04-04 | Resolved: 2024-12-13

## 2024-12-13 PROCEDURE — 99397 PER PM REEVAL EST PAT 65+ YR: CPT | Performed by: INTERNAL MEDICINE

## 2024-12-13 RX ORDER — LORAZEPAM 0.5 MG/1
0.5 TABLET ORAL EVERY 8 HOURS PRN
Qty: 90 TABLET | Refills: 1 | Status: SHIPPED | OUTPATIENT
Start: 2024-12-13

## 2024-12-13 NOTE — LETTER
Controlled Substance Prescribing Agreement          I, Becca Alba [PATIENT],  1955 [] a patient of  Shefali Back MD   [PROVIDER] at [facility] [PRACTICE], have been informed that  individuals who are prescribed certain Controlled Substances including, but not limited to, narcotic pain medicines, stimulants, benzodiazepine tranquilizers, and barbiturate sedatives, can abuse those substances or may allow abuse by others, and have some risk of developing an addictive disorder or suffering a relapse of a prior addiction. Therefore, I have been informed that it is necessary to observe strict rules pertaining to their use, and I agree to follow the terms and procedures described in this Agreement as consideration for, and as a condition of, the willingness of the physician whose signature appears below to consider prescribing or to continue prescribing Controlled Substances to treat my pain.     1. I will inform my physician of any current or past substance abuse, or any current or past substance abuse of any immediate member of my immediate family.     2. I agree that I may be subject to a voluntary evaluation by psychologists and/or psychiatrists, possibly at my own expense, before any Controlled Substances will be prescribed to me. I agree that the need to be evaluated by psychologists and/or psychiatrists may be revisited every three (3) to six (6) months thereafter while taking the medication.     3. All Controlled Substances must come from a provider in the PROVIDER’S PRACTICE. My Controlled Substances will come from the PROVIDER whose signature appears below, or during his or her absence, by the covering provider, unless specific written authorization is obtained from the office for an exception.     4. I will obtain all Controlled Substances from the same pharmacy. Should the need arise to change pharmacies, I will inform the PROVIDER’S office.     5. I will  inform the PROVIDER’S office of any new medications or medical conditions, and of any adverse effects I experience from any of the medications that I take.     6. I will inform my other health care providers that I am taking the Controlled Substances listed above, and of the existence of this Agreement. In the event of an emergency, I will provide the foregoing information to emergency department providers.     7. I agree that my prescribing PROVIDER has permission to discuss all diagnostic and treatment details with other health care providers, pharmacists, or other professionals who provide my health care regarding my use of Controlled Substances for purposes of maintaining accountability.     8. I will not allow anyone else to have, use sell, or otherwise have access to these medications. The sharing of medications with anyone is absolutely forbidden and is against the law.         9. I understand that Controlled Substances may be hazardous or lethal to a person who is not tolerant to their effects, especially a child, and that I must keep them out of reach of such people for their own safety.     10. I understand that tampering with a written prescription is a felony and I will not change or tamper with the PROVIDER’S written prescription.     11. I am aware that attempting to obtain a Controlled Substance under false pretenses is illegal.     12. I agree not to alter my medication in any way, and I will take my medication whole, and it will not be broken, chewed, crushed, injected, or snorted.     13. I will take my medication as instructed and prescribed, and I will not exceed the maximum prescribed dose. Any change in dosage must be approved by the PROVIDER or a physician within the PRACTICE.     14. I understand that these drugs should not be stopped abruptly, as withdrawal syndromes may develop.     15. I will cooperate with unannounced urine or serum toxicology screenings as may be requested, as well as  any random pill counts of medication by the PROVIDER. Failure to comply may result in termination of the PROVIDER-patient relationship.     16. I understand that the presence of unauthorized and/or illegal substances in the screenings described in the paragraph above may prompt referral for assessment for a substance abuse disorder or termination of the PROVIDER-patient relationship.     17. I understand that medications may not be replaced if they are lost, damaged, or stolen. If any of these situations arise that cause me to request an early refill of my medication, a copy of a filed police report or a statement from me explaining the circumstances may be required before additional prescriptions are considered. If I request an early refill secondary to lost, damaged, or stolen prescriptions twice within a year, I may be discharged from the practice.     18. I understand that a prescription may be given early if the PROVIDER or the patient will be out of town when the refill is due. These prescriptions will contain instructions to the pharmacist that the prescriptions(s) may not be filled prior to the appropriate date.     19. If the responsible legal authorities have questions concerning my treatment, as may occur, for example, if I obtained medication at several pharmacies, all confidentiality is waived, and these authorities may be given full access to my full records of Controlled Substances administration.     20. I will keep my scheduled appointments in order to receive medication renewals. If I need to cancel my appointment, I will do so a minimum of twenty-four (24) hours before it is scheduled.     21. I understand that I may be asked to bring my medications in their original container to the PROVIDER’s office while I am on controlled medication.     22. Refills generally will not be given over the phone, after office hours, during the weekends, and on holidays.     23. I understand that any medical  treatment is initially a trial, with the goal of treatment being to improve the quality of life and ability to function and/or work. These parameters will be assessed periodically to determine the benefits of continued therapy, and continued prescription is contingent on whether my physician believes that the medication usage benefits me. I will comply with all treatments as outlined by the PROVIDER.     24. I have been explained the risks and potential benefits of these therapies, including, but not limited to, psychological addiction, physical dependence, withdrawal and over dosage.     25. I understand that failure to adhere to these policies and/or failure to comply with the PROVIDER’S treatment plan may result in cessation of therapy with Controlled Substance prescribing by the PROVIDER or referral for further specialty assessment, as well as possible discharge from the PRACTICE.     26. I, the undersigned patient, attest that the foregoing was discussed with me, and that I have read, fully understand, and agree to all of the above requirements and instructions. I affirm that I have the full right and power to sign and be bound by this  Agreement.         Date:  __________________________________________    Time:  __________________________________________    Patient Printed Name:  _____________________________    Patient Signature:  ________________________________           Date:  __________________________________________    Time:  __________________________________________    Provider Signature:  _______________________________

## 2024-12-13 NOTE — PROGRESS NOTES
Subjective     Becca Alba is a 68 y.o. female who presents for a complete physical exam.      History of Present Illness     The following data was reviewed by: Shefali Back MD on 12/13/2024:  Common labs          6/12/2024    08:03 11/26/2024    08:04   Common Labs   Glucose 95  94    BUN 14  13    Creatinine 0.85  0.82    Sodium 140  140    Potassium 4.2  4.2    Chloride 105  104    Calcium 8.9  9.0    Total Protein 6.0  6.6    Albumin 4.3  4.2    Total Bilirubin 0.6  0.5    Alkaline Phosphatase 57  69    AST (SGOT) 24  23    ALT (SGPT) 11  14    WBC  2.19    Hemoglobin  13.8    Hematocrit  40.6    Platelets  159    Total Cholesterol 167  168    Triglycerides 114  72    HDL Cholesterol 43  47    LDL Cholesterol  103  107      Overall labs look good.       Review of Systems   Constitutional: Negative.    HENT: Negative.     Eyes: Negative.    Respiratory: Negative.     Cardiovascular: Negative.    Gastrointestinal: Negative.    Endocrine: Negative.    Genitourinary: Negative.    Musculoskeletal: Negative.    Skin: Negative.    Allergic/Immunologic: Negative.    Neurological: Negative.    Hematological: Negative.    Psychiatric/Behavioral: Negative.         The following portions of the patient's history were reviewed and updated as appropriate: allergies, current medications, past family history, past medical history, past social history, past surgical history and problem list.  Health maintenance tab was reviewed and updated with the patient.       Patient Active Problem List    Diagnosis Date Noted    Colon polyps 07/06/2023    Diverticulosis 07/06/2023    Vegetarian diet:  CHECK B12 12/09/2022    History of hysterectomy with bilateral oophorectomy 06/13/2022     Note Last Updated: 11/23/2022     Formatting of this note might be different from the original.  06/06/22 [facility]      Long-term current use of tamoxifen 04/04/2022    S/P bilateral mastectomy 03/26/2021     Note Last Updated: 11/23/2022      Formatting of this note might be different from the original.  5/17/2021 Bilateral Mastectomy      Cancer of right breast, stage 1, estrogen receptor positive 02/17/2021    Other insomnia 11/25/2019    Hyperlipidemia 04/25/2016     Note Last Updated: 11/25/2019     2.9% ASCVD risk on 11/25/2019      Leukopenia 04/25/2016     Note Last Updated: 4/25/2016     Description: chronic s/p CBC evaluation.      Osteoporosis 04/25/2016     Note Last Updated: 12/7/2021     Fosamax caused stomach upset.  Reclast given for five years.        Uncomplicated asthma 04/25/2016       Past Medical History:   Diagnosis Date    Allergic rhinitis     Anxiety     Arthritis     hip & joints thumb    Atypical chest pain     Chronic leukopenia     COVID 01/2022    Depression     H/O bone density study 12/21/2015    H/O mammogram 12/29/2015    History of EKG 11/09/2015    History of herpes genitalis     History of mononucleosis     Hyperlipidemia     IBS (irritable bowel syndrome)     high school    Osteopenia     Rib pain on left side     Tinnitus        Past Surgical History:   Procedure Laterality Date    BELPHAROPTOSIS REPAIR Bilateral     BLADDER SURGERY      BREAST LUMPECTOMY Right 03/2021    CATARACT EXTRACTION EXTRACAPSULAR W/ INTRAOCULAR LENS IMPLANTATION Bilateral     COLONOSCOPY  07/20/2012    COLONOSCOPY N/A 7/6/2023    Procedure: COLONOSCOPY to cecum with cold biopsy polypectomy;  Surgeon: Jaky Yanes MD;  Location: [facility] ENDOSCOPY;  Service: General;  Laterality: N/A;  pre - screening, family h/o colon cancer  post - colon polyp, diverticulosis, poor prep    ENDOSCOPY      LAPAROSCOPIC VAGINAL HYSTERECTOMY      MASTECTOMY Bilateral 05/2021    lymph node on right side removed    NEUROMA SURGERY  12/18/2014    SALPINGO OOPHORECTOMY Bilateral        Family History   Problem Relation Age of Onset    Diabetes type II Mother     Osteoarthritis Mother     Macular degeneration Father     Myelodysplastic syndrome Father      Celiac disease Daughter     Heart disease Maternal Grandmother     Lung cancer Maternal Grandmother     Colon cancer Maternal Grandmother     Heart disease Maternal Grandfather     Malig Hyperthermia Neg Hx        Social History     Socioeconomic History    Marital status:    Tobacco Use    Smoking status: Former     Current packs/day: 0.00     Types: Cigarettes     Start date:      Quit date:      Years since quittin.9    Smokeless tobacco: Never   Vaping Use    Vaping status: Never Used   Substance and Sexual Activity    Alcohol use: Yes     Alcohol/week: 2.0 standard drinks of alcohol     Types: 1 Glasses of wine, 1 Shots of liquor per week    Drug use: Never    Sexual activity: Not Currently     Partners: Male     Birth control/protection: Abstinence       Current Outpatient Medications on File Prior to Visit   Medication Sig Dispense Refill    acetaminophen (TYLENOL) 500 MG tablet Take 2 tablets by mouth Every 6 (Six) Hours As Needed for Mild Pain.      benzonatate (TESSALON) 200 MG capsule Take 1 capsule by mouth 2 (Two) Times a Day As Needed for Cough for up to 10 days. 20 capsule 0    exemestane (AROMASIN) 25 MG tablet Take 1 tablet by mouth Daily.      ibuprofen (ADVIL,MOTRIN) 200 MG tablet Take 1 tablet by mouth Every 6 (Six) Hours As Needed for Mild Pain.      LORazepam (ATIVAN) 0.5 MG tablet Take 1 tablet by mouth Every 8 (Eight) Hours As Needed for Anxiety. for anxiety 90 tablet 0    meloxicam (MOBIC) 15 MG tablet TAKE ONE TABLET BY MOUTH DAILY 90 tablet 2    valACYclovir (VALTREX) 500 MG tablet Take 1 tablet by mouth 2 (Two) Times a Day. 30 tablet 5    VITAMIN D PO Take 1 capsule by mouth Daily.      Beclomethasone Diprop HFA (Qvar RediHaler) 40 MCG/ACT inhaler INHALE TWO PUFFS BY MOUTH TWICE A DAY (Patient not taking: Reported on 2024) 10.6 each 11    erythromycin (ROMYCIN) 5 MG/GM ophthalmic ointment Apply  to eye(s) as directed by provider Every 4 (Four) Hours While Awake.  "(Patient not taking: Reported on 12/13/2024) 1 each 0    methylPREDNISolone (MEDROL) 4 MG dose pack Take as directed on package instructions. (Patient not taking: Reported on 12/13/2024) 21 tablet 0     No current facility-administered medications on file prior to visit.       Allergies   Allergen Reactions    Codeine Other (See Comments)     \"Crazy\"    Hydrocodone Other (See Comments)     \"Crazy\"    Nitrofurantoin Nausea Only    Amoxicillin-Pot Clavulanate GI Intolerance    Pneumovax [Pneumococcal Polysaccharide Vaccine] Rash       Immunization History   Administered Date(s) Administered    COVID-19 (MODERNA) 1st,2nd,3rd Dose Monovalent 02/03/2021, 03/03/2021, 09/03/2021    COVID-19 (PFIZER) 12YRS+ (COMIRNATY) 12/09/2023    COVID-19 (UNSPECIFIED) 02/03/2021, 03/03/2021, 09/03/2021    Covid-19 (Pfizer) Gray Cap Monovalent 11/25/2022    Fluzone  >6mos 11/20/2017    Fluzone (or Fluarix & Flulaval for VFC) >6mos 11/21/2018, 10/18/2019, 09/14/2020, 11/11/2022    Fluzone High-Dose 65+YRS 10/16/2024    Fluzone High-Dose 65+yrs 09/25/2023    Fluzone Quad >6mos (Multi-dose) 11/14/2016, 11/20/2017    Hepatitis A 05/04/2018, 11/21/2018    Influenza TIV (IM) 01/01/2014, 11/09/2015    Influenza, Unspecified 11/21/2018, 10/18/2019, 09/14/2020, 11/11/2022    Pneumococcal Polysaccharide (PPSV23) 11/20/2017    Shingrix 03/25/2019, 08/18/2019, 09/01/2019, 09/01/2019, 10/01/2019    Tdap 07/22/2008, 12/27/2018, 01/07/2024    flucelvax quad pfs =>4 YRS 11/21/2018, 10/18/2019       Objective     /60   Pulse 87   Temp 98.3 °F (36.8 °C) (Oral)   Ht 160 cm (62.99\")   Wt 60.8 kg (134 lb)   SpO2 95%   BMI 23.74 kg/m²     Physical Exam  Constitutional:       Appearance: She is well-developed.   HENT:      Head: Normocephalic and atraumatic.      Right Ear: Hearing, tympanic membrane and external ear normal.      Left Ear: Hearing, tympanic membrane and external ear normal.      Nose: Nose normal.   Neck:      Thyroid: No " thyromegaly.   Cardiovascular:      Rate and Rhythm: Normal rate and regular rhythm.      Heart sounds: Normal heart sounds. No murmur heard.  Pulmonary:      Effort: Pulmonary effort is normal.      Breath sounds: Normal breath sounds.   Abdominal:      General: There is no distension.      Palpations: Abdomen is soft.      Tenderness: There is no abdominal tenderness.   Musculoskeletal:      Cervical back: Neck supple.   Lymphadenopathy:      Cervical: No cervical adenopathy.   Skin:     General: Skin is warm and dry.   Neurological:      Mental Status: She is alert and oriented to person, place, and time.   Psychiatric:         Speech: Speech normal.         Behavior: Behavior normal.         Thought Content: Thought content normal.         Judgment: Judgment normal.         Assessment & Plan   Diagnoses and all orders for this visit:    1. Well adult exam (Primary)        Discussion    Patient presents today for a CPE.      Patient follows a healthy diet.   Patient follows an adequate exercise regimen. Mammogram is not indicated. Colon cancer screening is up to date.   Discussed importance of preventative care including vaccinations, age appropriate cancer screening, routine lab work, healthy diet, and active lifestyle.  Chronic insomnia.  Control is stable.  The patient is advised to continue current dosage of prn ativan.              Health Maintenance   Topic Date Due    COVID-19 Vaccine (9 - 2024-25 season) 09/01/2024    ANNUAL PHYSICAL  12/11/2024    DXA SCAN  06/09/2025    LIPID PANEL  11/26/2025    COLORECTAL CANCER SCREENING  07/06/2033    TDAP/TD VACCINES (4 - Td or Tdap) 01/07/2034    HEPATITIS C SCREENING  Completed    INFLUENZA VACCINE  Completed    ZOSTER VACCINE  Completed    Pneumococcal Vaccine 65+  Discontinued    MAMMOGRAM  Discontinued            No future appointments.

## 2025-01-15 ENCOUNTER — OFFICE VISIT (OUTPATIENT)
Dept: INTERNAL MEDICINE | Facility: CLINIC | Age: 70
End: 2025-01-15
Payer: COMMERCIAL

## 2025-01-15 VITALS
OXYGEN SATURATION: 98 % | HEART RATE: 64 BPM | WEIGHT: 134 LBS | BODY MASS INDEX: 23.74 KG/M2 | SYSTOLIC BLOOD PRESSURE: 124 MMHG | DIASTOLIC BLOOD PRESSURE: 72 MMHG | HEIGHT: 63 IN

## 2025-01-15 DIAGNOSIS — M25.562 ACUTE PAIN OF LEFT KNEE: Primary | ICD-10-CM

## 2025-01-15 PROCEDURE — 99213 OFFICE O/P EST LOW 20 MIN: CPT | Performed by: INTERNAL MEDICINE
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
## 2025-02-10 ENCOUNTER — OFFICE VISIT (OUTPATIENT)
Dept: INTERNAL MEDICINE | Facility: CLINIC | Age: 70
End: 2025-02-10
Payer: COMMERCIAL

## 2025-02-10 VITALS
HEIGHT: 63 IN | SYSTOLIC BLOOD PRESSURE: 116 MMHG | OXYGEN SATURATION: 98 % | BODY MASS INDEX: 23.74 KG/M2 | DIASTOLIC BLOOD PRESSURE: 70 MMHG | HEART RATE: 73 BPM | WEIGHT: 134 LBS

## 2025-02-10 DIAGNOSIS — M25.562 LEFT KNEE PAIN, UNSPECIFIED CHRONICITY: Primary | ICD-10-CM

## 2025-02-10 DIAGNOSIS — M25.362 KNEE INSTABILITY, LEFT: ICD-10-CM

## 2025-02-10 PROCEDURE — 99213 OFFICE O/P EST LOW 20 MIN: CPT | Performed by: INTERNAL MEDICINE

## 2025-02-11 NOTE — PROGRESS NOTES
Subjective     Becca Alba is a 69 y.o. female who presents with   Chief Complaint   Patient presents with    Knee Pain       Knee Pain   Pertinent negatives include no numbness.        F/u left knee pain.  She was doing well with meloxicam and physical therapy.  Acutely worse on Sunday with activity.  Pain is much worse.  Knee feels unstable.    Review of Systems   Neurological:  Negative for numbness.       The following portions of the patient's history were reviewed and updated as appropriate: allergies, current medications and problem list.    Patient Active Problem List    Diagnosis Date Noted    Colon polyps 07/06/2023    Diverticulosis 07/06/2023    Vegetarian diet:  CHECK B12 12/09/2022    History of hysterectomy with bilateral oophorectomy 06/13/2022     Note Last Updated: 11/23/2022     Formatting of this note might be different from the original.  06/06/22 [facility]      S/P bilateral mastectomy 03/26/2021     Note Last Updated: 11/23/2022     Formatting of this note might be different from the original.  5/17/2021 Bilateral Mastectomy      Cancer of right breast, stage 1, estrogen receptor positive 02/17/2021    Other insomnia 11/25/2019    Hyperlipidemia 04/25/2016     Note Last Updated: 11/25/2019     2.9% ASCVD risk on 11/25/2019      Leukopenia 04/25/2016     Note Last Updated: 4/25/2016     Description: chronic s/p CBC evaluation.      Osteoporosis 04/25/2016     Note Last Updated: 12/7/2021     Fosamax caused stomach upset.  Reclast given for five years.        Uncomplicated asthma 04/25/2016       Current Outpatient Medications on File Prior to Visit   Medication Sig Dispense Refill    acetaminophen (TYLENOL) 500 MG tablet Take 2 tablets by mouth Every 6 (Six) Hours As Needed for Mild Pain.      ciprofloxacin (CIPRO) 500 MG tablet Take 1 tablet by mouth 2 (Two) Times a Day for 7 days. 14 tablet 0    exemestane (AROMASIN) 25 MG tablet Take 1 tablet by mouth Daily.      LORazepam (ATIVAN) 0.5  "MG tablet Take 1 tablet by mouth Every 8 (Eight) Hours As Needed for Anxiety. for anxiety 90 tablet 1    meloxicam (MOBIC) 15 MG tablet TAKE ONE TABLET BY MOUTH DAILY 90 tablet 2    valACYclovir (VALTREX) 500 MG tablet Take 1 tablet by mouth 2 (Two) Times a Day. 30 tablet 5    VITAMIN D PO Take 1 capsule by mouth Daily.       No current facility-administered medications on file prior to visit.       Objective     /70   Pulse 73   Ht 160 cm (62.99\")   Wt 60.8 kg (134 lb)   SpO2 98%   BMI 23.74 kg/m²     Physical Exam  Constitutional:       Appearance: She is well-developed.   HENT:      Head: Normocephalic and atraumatic.   Pulmonary:      Effort: Pulmonary effort is normal.   Musculoskeletal:      Left knee: Swelling and crepitus present. Tenderness present.      Instability Tests: Anterior drawer test negative. Posterior drawer test negative.   Neurological:      Mental Status: She is alert and oriented to person, place, and time.   Psychiatric:         Behavior: Behavior normal.         Assessment & Plan   Diagnoses and all orders for this visit:    1. Left knee pain, unspecified chronicity (Primary)  -     Ambulatory Referral to Orthopedic Surgery  -     MRI Knee Left Without Contrast; Future    2. Knee instability, left  -     Ambulatory Referral to Orthopedic Surgery  -     MRI Knee Left Without Contrast; Future        Discussion    Patient presents in f/u of knee pain for which she was doing conservative management.  She is acutely worse.  MRI is ordered.  Referral for evaluation with ortho.         Future Appointments   Date Time Provider Department Center   2/15/2025  2:00 PM [facility] None   2/17/2025  9:20 AM Kymberly Flores APRN [facility]   12/12/2025  8:10 AM [facility]   12/19/2025  7:45 AM Shefali Back MD [facility]         "

## 2025-02-15 ENCOUNTER — HOSPITAL ENCOUNTER (OUTPATIENT)
Facility: HOSPITAL | Age: 70
Discharge: HOME | End: 2025-02-15
Payer: COMMERCIAL

## 2025-02-15 DIAGNOSIS — M25.562 LEFT KNEE PAIN, UNSPECIFIED CHRONICITY: ICD-10-CM

## 2025-02-15 DIAGNOSIS — M25.362 KNEE INSTABILITY, LEFT: ICD-10-CM

## 2025-02-15 PROCEDURE — 73721 MRI JNT OF LWR EXTRE W/O DYE: CPT

## 2025-02-17 ENCOUNTER — OFFICE VISIT (OUTPATIENT)
Dept: ORTHOPEDIC SURGERY | Facility: CLINIC | Age: 70
End: 2025-02-17
Payer: COMMERCIAL

## 2025-02-17 VITALS — BODY MASS INDEX: 24.5 KG/M2 | WEIGHT: 138.3 LBS | TEMPERATURE: 97.8 F | HEIGHT: 63 IN

## 2025-02-17 DIAGNOSIS — M17.12 PRIMARY OSTEOARTHRITIS OF LEFT KNEE: Primary | ICD-10-CM

## 2025-02-17 RX ORDER — METHYLPREDNISOLONE ACETATE 80 MG/ML
80 INJECTION, SUSPENSION INTRA-ARTICULAR; INTRALESIONAL; INTRAMUSCULAR; SOFT TISSUE
Status: COMPLETED | OUTPATIENT
Start: 2025-02-17 | End: 2025-02-17

## 2025-02-17 RX ADMIN — METHYLPREDNISOLONE ACETATE 80 MG: 80 INJECTION, SUSPENSION INTRA-ARTICULAR; INTRALESIONAL; INTRAMUSCULAR; SOFT TISSUE at 10:12

## 2025-02-17 NOTE — PROGRESS NOTES
Patient: Becca Alba  YOB: 1955 69 y.o. female  Medical Record Number: [id number]    Chief Complaints:   Chief Complaint   Patient presents with   • Left Knee - Initial Evaluation       History of Present Illness:Becca Alba is a 69 y.o. female who presents as a new patient both myself as well as to the practice with complaints of increased left knee pain and instability.  She reports that started about 2 months ago, worse with going up and down stairs, she initially had x-rays which showed some arthritis, she tried physical therapy that seemed to make the pain worse she then had a subsequent MRI which unfortunately did show degenerative meniscus tears and significant osteoarthritis.    Allergies:   Allergies   Allergen Reactions   • Codeine Other (See Comments)     itching   • Hydrocodone Other (See Comments)     itching   • Nitrofurantoin Nausea Only   • Amoxicillin-Pot Clavulanate GI Intolerance   • Pneumovax [Pneumococcal Polysaccharide Vaccine] Rash       Medications:   Current Outpatient Medications   Medication Sig Dispense Refill   • acetaminophen (TYLENOL) 500 MG tablet Take 2 tablets by mouth Every 6 (Six) Hours As Needed for Mild Pain.     • exemestane (AROMASIN) 25 MG tablet Take 1 tablet by mouth Daily.     • LORazepam (ATIVAN) 0.5 MG tablet Take 1 tablet by mouth Every 8 (Eight) Hours As Needed for Anxiety. for anxiety 90 tablet 1   • meloxicam (MOBIC) 15 MG tablet TAKE ONE TABLET BY MOUTH DAILY 90 tablet 2   • valACYclovir (VALTREX) 500 MG tablet Take 1 tablet by mouth 2 (Two) Times a Day. 30 tablet 5   • VITAMIN D PO Take 1 capsule by mouth Daily.       No current facility-administered medications for this visit.         The following portions of the patient's history were reviewed and updated as appropriate: allergies, current medications, past family history, past medical history, past social history, past surgical history and problem list.    Review of Systems:   14  "point review of systems was performed. All systems negative except pertinent positives/negatives listed in HPI above    Physical Exam:   Vitals:    02/17/25 0926   Temp: 97.8 °F (36.6 °C)   Weight: 62.7 kg (138 lb 4.8 oz)   Height: 160 cm (63\")       General: A and O x 3, ASA, NAD    Skin clear no unusual lesions noted  Left knee patient has trace amount of effusion noted with 122 degrees flexion neutral in extension positive Regina negative Lockman calf soft and nontender      Radiology:  Xrays 3views (ap,lateral, sunrise) left knee were reviewed as well as MRI and findings are above    Assessment/Plan: Osteoarthritis left knee with increasing pain    Patient and I discussed options including conservative measures, we will try left knee cortisone injection, she will continue with meloxicam, we will send her back to physical therapy, in addition we briefly discussed total knee replacement in the future if and when her symptoms worsen.  Patient verbalized understanding we will see her back as needed    Large Joint Arthrocentesis: L knee  Date/Time: 2/17/2025 10:12 AM  Consent given by: patient  Site marked: site marked  Timeout: Immediately prior to procedure a time out was called to verify the correct patient, procedure, equipment, support staff and site/side marked as required   Supporting Documentation  Indications: pain and joint swelling   Procedure Details  Location: knee - L knee  Preparation: Patient was prepped and draped in the usual sterile fashion  Needle size: 22 G  Approach: anterolateral  Medications administered: 80 mg methylPREDNISolone acetate 80 MG/ML; 2 mL lidocaine (cardiac)  Patient tolerance: patient tolerated the procedure well with no immediate complications           Kymberly Flores, APRN  2/17/2025  "

## 2025-03-05 ENCOUNTER — TREATMENT (OUTPATIENT)
Dept: PHYSICAL THERAPY | Facility: CLINIC | Age: 70
End: 2025-03-05
Payer: COMMERCIAL

## 2025-03-05 DIAGNOSIS — M17.12 PRIMARY OSTEOARTHRITIS OF LEFT KNEE: Primary | ICD-10-CM

## 2025-03-05 NOTE — PROGRESS NOTES
[facility]    Physical Therapy Initial Evaluation and Plan of Care    Patient Name: Becca Alba          :  1955  Referring Physician: Kymberly Flores APRN  Diagnosis: Primary osteoarthritis of left knee [M17.12] ;   Date of Evaluation: 3/5/2025  ______________________________________________________________________    Subjective Evaluation    History of Present Illness  Mechanism of injury: (L) KNEE: December - Insidious onset - snowed - shoveled - aggrevated  - Knee popped painfully when sitting down on bench in BAR class - - PC - went to [facility] - worse - MRI - Arthritis, meniscus - Corizone 2.5 weeks ago - Cortizone helpful -        Was severe - used a cane - couldn't do stairs -   Not 100% , but still problems -   Doing pure bar 3-4x/wk for 1.5+ years - last session 1.5 weeks ago -pain worse after - not since -           Patient Occupation: teacher - [employer] - Up/down stairs - alot of walking; [place] (2nd floor) - PURE BAR 3-4x/wk Pain  Current pain ratin  At worst pain ratin (with cortizone; 2)  Location: medial and posterior knee knee (L);  Quality: Ache / sharp.  Alleviating factors: Rest / non-use - heat; Corizone -  Exacerbated by: Walking, stairs, squatting, twisting, rising,  Progression: worsening    Social Support  Patient lives at: Home with stairs (to basement / laundry); 3-4 seps into house.    Diagnostic Tests  Abnormal x-ray: Report says normal, but appears to have decreased jt space medial knee and PF OA -.  MRI studies: abnormal    Treatments  Previous treatment: physical therapy  Current treatment: injection treatment and medication  Patient Goals  Patient/family treatment goals: Return to BAR CLASS -      MRI: 2/15/2025  IMPRESSION:  1. Full-thickness or high-grade near full-thickness radial tear through  the medial meniscus posterior root with mild medial protrusion of the  meniscal body.  2. Mild likely degenerative fraying of the lateral meniscus body inner  free  margin.  4. Focal full-thickness or near full-thickness chondral fissuring at the  superior medial patellar facet and patellar ridge.   ___________________________________________________  Objective          Observations     Additional Knee Observation Details  Mild swelling (L) ant/med, post knee   Genu valgus (L);  (B) Pes Planus   Mildly antalgic gait LLE - w/o AD     Tenderness     Additional Tenderness Details  Tender ant/medial, medial jt line (L) ; PLICA lateral PF jt, but non-tender -   Palpable crepitus PF jt (L)     Active Range of Motion   Left Knee   Normal active range of motion    Right Knee   Normal active range of motion    Strength/Myotome Testing     Left Knee   Flexion: 4+  Extension: 4+  Quadriceps contraction: fair    Right Knee   Normal strength    Additional Strength Details  Glute medius (L) 4/5     Tests     Additional Tests Details  (L) Knee: Pain w/ Thessally's (L) knee; (-) Ant/post drawer; Valgus/Varus;   PF pain w/ squat test and step up/down (L)         TREATMENT:   MANUAL THERAPY:   []     []     []       EXERCISES:   [x]   SIT to STAND x 10  [x]   SIDESTEPPING R/L  [x]   STAIRS up/Down Reciprocally w/ HR prn -2 flights  []     []     [x]   FABRICATED HEP and REVIEWED w/ PATIENT    FUNCTIONAL ACTIVITIES:   TAPING / BRACING: K-tape to 1) Unload PF jt and Medial jt line x 2 strips (L) -   ANATOMY / DYSFUNCTION EDUCATION  Jt protection, ADL modification; Posture and Ergonomics / Exercise modifications at [facility], etc to prevent increasing knee pain -   ___________________________________________________  Assessment & Plan       Assessment  Impairments: abnormal gait, activity intolerance, impaired physical strength, lacks appropriate home exercise program, pain with function and weight-bearing intolerance   Other impairment: Unable to ambulate stairs reciprocally -  Functional limitations: walking, uncomfortable because of pain, standing and unable to perform repetitive tasks    Assessment details: 68 yo female: (L) knee pain / OA; May need to rule-out possible medial meniscus involvement -     GOALS:   SHORT TERM GOALS: 4-6 weeks:    1) HEP Initiated; including flexibility, strengthening/stabilization, proprioception / NMR, therapeutic activities, etc.  2) Pain decreased 50%: at rest and w/ LLE activities -   3)  Pt demonstrates improved postural positioning / ergonomics / body mechanics w/ verbal / tactile cuing -;     LONG TERM GOALS: 8-12 weeks (or at time of DISCHARGE):   1) (I) HEP;   2) AROM WFL and pain free;   3) Strength / endurance to be able to perform all ADL's and job-related activities w/o restrictions;   4) Pt demonstrates improved postural positioning / ergonomics / body mechanics w/ minimal cuing  5) Functional Test KOS score improved >75/80      Prognosis: good    Plan  Therapy options: will be seen for skilled therapy services  Planned therapy interventions: flexibility, home exercise program, manual therapy, neuromuscular re-education, strengthening, stretching and therapeutic activities (Modalities prn; Taping / bracing prn;)  Frequency: 1-2.  Duration in weeks: 6  Treatment plan discussed with: patient      ___________________________________________________  Manual Therapy:         mins  71853;  Therapeutic Exercise:    08     mins  99361;     Neuromuscular Michelle:        mins  30804;    Therapeutic Activity:     23     mins  64890;   Self Care:                           mins  04710  Ultrasound:          mins  64546;  Iontophoresis:          mins  47066;    Electrical Stimulation:         mins  86102 ( );  Mechanical Traction:          mins  06624  Dry Needling          mins self-pay    Eval:   20   mins    Timed Treatment:   31   mins                  Total Treatment:     51   mins    PT SIGNATURE:     Anibal Haywood, PT  DATE TREATMENT INITIATED: 3/5/2025  ___________________________________________________  Initial Certification  Certification Period:  6/3/2025  I certify that the therapy services are furnished while this patient is under my care.  The services outlined above are required by this patient, and will be reviewed every 90 days.     PHYSICIAN: ________________________________  DATE: ______  Kymberly Flores APRN        Please sign and return via fax to  [phone]. Thank you, [facility].  ______________________________________________________________________  [address] [place] [address]  Phone: [phone] Fax: [phone]    Access Code: [email or web address]  URL: [email or web address]  Date: 03/05/2025  Prepared by: Tim Haywood    Exercises  - Side Stepping with Resistance at Thighs  - 1 x daily - 3-4 x weekly - 1 sets - 3 reps - 30-40 feet - right / left  - Sit to Stand Without Arm Support  - 1 x daily - 7 x weekly - 1-2 sets - 10-15 reps - 3-5 hold

## 2025-04-01 ENCOUNTER — TREATMENT (OUTPATIENT)
Dept: PHYSICAL THERAPY | Facility: CLINIC | Age: 70
End: 2025-04-01
Payer: COMMERCIAL

## 2025-04-01 DIAGNOSIS — M17.12 PRIMARY OSTEOARTHRITIS OF LEFT KNEE: Primary | ICD-10-CM

## 2025-04-01 DIAGNOSIS — Z74.09 IMPAIRED FUNCTIONAL MOBILITY AND ACTIVITY TOLERANCE: ICD-10-CM
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
## 2025-04-07 ENCOUNTER — TREATMENT (OUTPATIENT)
Dept: PHYSICAL THERAPY | Facility: CLINIC | Age: 70
End: 2025-04-07
Payer: COMMERCIAL

## 2025-04-07 DIAGNOSIS — M17.12 PRIMARY OSTEOARTHRITIS OF LEFT KNEE: Primary | ICD-10-CM

## 2025-04-07 DIAGNOSIS — Z74.09 IMPAIRED FUNCTIONAL MOBILITY AND ACTIVITY TOLERANCE: ICD-10-CM

## 2025-04-07 PROCEDURE — 97530 THERAPEUTIC ACTIVITIES: CPT | Performed by: PHYSICAL THERAPIST

## 2025-04-07 PROCEDURE — 97110 THERAPEUTIC EXERCISES: CPT | Performed by: PHYSICAL THERAPIST

## 2025-04-07 PROCEDURE — 97112 NEUROMUSCULAR REEDUCATION: CPT | Performed by: PHYSICAL THERAPIST

## 2025-04-07 NOTE — PROGRESS NOTES
Physical Therapy Daily Note    Patient Name: Becca Alba         :  1955  Referring Physician: Kymberly Flores APRN  Treatment Date: 2025  Date of Initial Visit: Type: THERAPY  Noted: 3/5/2025    Visit Diagnoses:    ICD-10-CM ICD-9-CM   1. Primary osteoarthritis of left knee  M17.12 715.16   2. Impaired functional mobility and activity tolerance  Z74.09 V49.89     Patient seen for 3 sessions  _____________________________________________________    Subjective   Becca Alba reports: knee painful yesterday - knee about the same - Knee worse later in the day -     Objective   Pt ambulating w/o AD - able to sit/stand std chair w/o UE support -     TREATMENT:   MANUAL THERAPY:   []     []     []        EXERCISES:   [x]   SIT to STAND x 15  []   SIDESTEPPING R/L  [x]   STAIRS up/Down Reciprocally w/ HR prn -2 flights  [x]   JARED HIP ABD 30#  15, 10 reps  [x]   JARED LEG PRESS 110 x 15, 120 x 10   [x]   FWD / RUNNER STEP UP  Hole 4 from bottom x 15  [x]   LATERAL STEP UP w/ ABDuction Hole 3 from bottom x 15  [x]   SLS (L) 2x 1 min  []     []     []     [x]   NUSTEP SEAT 6  ARMS 10  L7 x 10 min  []   UPDATED HEP and REVIEWED w/ PATIENT     FUNCTIONAL ACTIVITIES:   TAPING / BRACING: K-tape to 1) Unload PF jt and 2) Medial jt line x 2 strips (L) -  SEE EXERCISEs -        Assessment/Plan  68 yo female: (L) knee pain / OA; May need to rule-out possible medial meniscus involvement -   Tape helpful -     Progress strengthening /stabilization /functional activity       _________________________________________________    Manual Therapy:                 mins  42980;  Therapeutic Exercise:    23    mins  35773;     Neuromuscular Michelle:   08     mins  81807;    Therapeutic Activity:     08      mins  48832;     Ultrasound:                          mins  47971;  Iontophoresis:                     mins  08615;    Electrical Stimulation:         mins  85553 ( );  Mechanical Traction:          mins   53770  Dry Needling                       mins self-pay     Timed Treatment:   39    mins                  Total Treatment:     39    mins        Anibal Haywood, PT  Physical Therapist  Lic # [id number]

## 2025-04-10 ENCOUNTER — TREATMENT (OUTPATIENT)
Dept: PHYSICAL THERAPY | Facility: CLINIC | Age: 70
End: 2025-04-10
Payer: COMMERCIAL

## 2025-04-10 DIAGNOSIS — Z74.09 IMPAIRED FUNCTIONAL MOBILITY AND ACTIVITY TOLERANCE: ICD-10-CM

## 2025-04-10 DIAGNOSIS — M17.12 PRIMARY OSTEOARTHRITIS OF LEFT KNEE: Primary | ICD-10-CM

## 2025-04-10 NOTE — PROGRESS NOTES
Physical Therapy Daily Note    Patient Name: Becca Alba         :  1955  Referring Physician: Kymberly Flores APRN  Treatment Date: 4/10/2025  Date of Initial Visit: Type: THERAPY  Noted: 3/5/2025    Visit Diagnoses:    ICD-10-CM ICD-9-CM   1. Primary osteoarthritis of left knee  M17.12 715.16   2. Impaired functional mobility and activity tolerance  Z74.09 V49.89     Patient seen for 4 sessions  _____________________________________________________    Subjective   Becca Alba reports: tape very helpful - less pain when taped -     Objective   Pt ambulating w/o AD -     TREATMENT:   MANUAL THERAPY:   []     []     []        EXERCISES:   [x]   SIT to STAND x 15  []   SIDESTEPPING R/L  [x]   STAIRS up/Down Reciprocally w/ HR prn -2 flights  [x]   JARED HIP ABD 30#  15, 10 reps  [x]   JARED LEG PRESS 110 x 15, 120 x 10   [x]   FWD / RUNNER STEP UP  Hole 4 from bottom x 15  [x]   LATERAL STEP UP w/ ABDuction Hole 3 from bottom x 15  [x]   SLS (L) 2x 1 min  []     []     []     [x]   NUSTEP SEAT 6  ARMS 10  L7 x 10 min  []   UPDATED HEP and REVIEWED w/ PATIENT     FUNCTIONAL ACTIVITIES:   TAPING / BRACING: K-tape to 1) Unload PF jt and 2) Medial jt line x 2 strips (L) -  SEE EXERCISEs -   Discussed progressing back into BAR class, etc.     Assessment/Plan  70 yo female: (L) knee pain / OA; May need to rule-out possible medial meniscus involvement -   Tape helpful -     Progress strengthening /stabilization /functional activity       _________________________________________________    Manual Therapy:                 mins  31171;  Therapeutic Exercise:    15    mins  39182;     Neuromuscular Michelle:   08     mins  57367;    Therapeutic Activity:     23     mins  21933;     Ultrasound:                          mins  04129;  Iontophoresis:                     mins  84245;    Electrical Stimulation:         mins  50104 ( );  Mechanical Traction:          mins  23829  Dry Needling                        mins self-pay     Timed Treatment:   46    mins                  Total Treatment:     46    mins        Anibal Haywood, PT  Physical Therapist  Lic # [id number]   no

## 2025-04-14 ENCOUNTER — TREATMENT (OUTPATIENT)
Dept: PHYSICAL THERAPY | Facility: CLINIC | Age: 70
End: 2025-04-14
Payer: COMMERCIAL

## 2025-04-14 DIAGNOSIS — M17.12 PRIMARY OSTEOARTHRITIS OF LEFT KNEE: Primary | ICD-10-CM

## 2025-04-14 DIAGNOSIS — Z74.09 IMPAIRED FUNCTIONAL MOBILITY AND ACTIVITY TOLERANCE: ICD-10-CM

## 2025-04-14 PROCEDURE — 97110 THERAPEUTIC EXERCISES: CPT | Performed by: PHYSICAL THERAPIST

## 2025-04-14 PROCEDURE — 97530 THERAPEUTIC ACTIVITIES: CPT | Performed by: PHYSICAL THERAPIST

## 2025-04-14 PROCEDURE — 97112 NEUROMUSCULAR REEDUCATION: CPT | Performed by: PHYSICAL THERAPIST

## 2025-04-14 NOTE — PROGRESS NOTES
Physical Therapy Daily Note    Patient Name: Becca Alba         :  1955  Referring Physician: Kymberly Flores APRN  Treatment Date: 2025  Date of Initial Visit: Type: THERAPY  Noted: 3/5/2025    Visit Diagnoses:    ICD-10-CM ICD-9-CM   1. Primary osteoarthritis of left knee  M17.12 715.16   2. Impaired functional mobility and activity tolerance  Z74.09 V49.89     Patient seen for 5 sessions  _____________________________________________________    Subjective   Becca Alba reports: took a class  and was doing ok, except for the lunges and she avoided them - and modifies other activities as needed for her (L) knee -     Objective   Good SLS (L), but limited dynamically w/ (R)    TREATMENT:   MANUAL THERAPY:   []     []     []        EXERCISES:   [x]   SIT to STAND / squat - butt touch x 15  []   SIDESTEPPING R/L  [x]   STAIRS up/Down Reciprocally w/ HR prn -2 flights  [x]   JARED HIP ABD 30#  15, 10 reps  [x]   JARED LEG PRESS 110 x 15, 120 x 10   [x]   FWD / RUNNER STEP UP  Hole 4 from bottom x 15  [x]   LATERAL STEP UP w/ ABDuction Hole 3 from bottom x 15  [x]   SIDE STEP SQUATS 30 ft R/L  [x]   SLS (L) 2x 1 min with opp LE movement / challenges  [x]   BALANCE BOARD Frnt/Bk;  Side / Side x 2 min ea  []     []     [x]   NUSTEP SEAT 6  ARMS 10  L7 x 10 min  []   UPDATED HEP and REVIEWED w/ PATIENT     FUNCTIONAL ACTIVITIES:   TAPING / BRACING: K-tape to  []   1) Unload PF jt and [x]   2) Medial and Lat jt line x 1 strip ea (L) -  SEE EXERCISEs -   Discussed progressing back into BAR class, etc.        Assessment/Plan  68 yo female: (L) knee pain / OA; May need to rule-out possible medial meniscus involvement -   Tape helpful -     Progress strengthening /stabilization /functional activity       _________________________________________________    Manual Therapy:                 mins  10621;  Therapeutic Exercise:   17    mins  51113;     Neuromuscular Michelle:   08     mins  15563;     Therapeutic Activity:      18     mins  53196;     Ultrasound:                          mins  11593;  Iontophoresis:                     mins  23036;    Electrical Stimulation:         mins  72350 ( );  Mechanical Traction:          mins  51506  Dry Needling                       mins self-pay     Timed Treatment:   43    mins                  Total Treatment:     43   mins        Anibal Haywood PT  Physical Therapist  Lic # [id number]

## 2025-04-16 ENCOUNTER — TREATMENT (OUTPATIENT)
Dept: PHYSICAL THERAPY | Facility: CLINIC | Age: 70
End: 2025-04-16
Payer: COMMERCIAL

## 2025-04-16 DIAGNOSIS — M17.12 PRIMARY OSTEOARTHRITIS OF LEFT KNEE: Primary | ICD-10-CM

## 2025-04-16 DIAGNOSIS — Z74.09 IMPAIRED FUNCTIONAL MOBILITY AND ACTIVITY TOLERANCE: ICD-10-CM

## 2025-04-16 NOTE — PROGRESS NOTES
Physical Therapy Daily Note    Patient Name: Becca Alba         :  1955  Referring Physician: Kymberly Flores APRN  Treatment Date: 2025  Date of Initial Visit: Type: THERAPY  Noted: 3/5/2025    Visit Diagnoses:    ICD-10-CM ICD-9-CM   1. Primary osteoarthritis of left knee  M17.12 715.16   2. Impaired functional mobility and activity tolerance  Z74.09 V49.89     Patient seen for 6 sessions  _____________________________________________________    Subjective   Becca Alba reports: no new problems - took tape off due to skin irritations - have not noted pain since tape removed -     Objective   Trendelenburg RLE - glute med weak (previous (R) hip issues a couple of years ago)    TREATMENT:   MANUAL THERAPY:   []     []     []        EXERCISES:   [x]   SIT to STAND / squat - butt touch x 15  []   SIDESTEPPING R/L  [x]   STAIRS up/Down Reciprocally w/ HR prn -2 flights  [x]   JARED HIP ABD 50#  15, 10 reps  [x]   JARED LEG PRESS 140# 2x 15  [x]   FWD / RUNNER STEP UP  Hole 4 from bottom x 15  [x]   LATERAL STEP UP w/ ABDuction Hole 3 from bottom x 15  [x]   SIDE STEP SQUATS 30 ft R/L  [x]   SLS (L) 2x 1 min with opp LE movement / challenges  [x]   BALANCE BOARD Frnt/Bk;  Side / Side x 2 min ea  [x]   HIP ER STRETCH (R)  [x]   SL HIP ABD (R)  []   NUSTEP SEAT 6  ARMS 10  L7 x 10 min  []   UPDATED HEP and REVIEWED w/ PATIENT     FUNCTIONAL ACTIVITIES:   TAPING / BRACING: K-tape to  []   1) Unload PF jt and []   2) Medial and Lat jt line x 1 strip ea (L) -  SEE EXERCISEs -   Discussed progressing back into BAR class, etc.        Assessment/Plan  68 yo female: (L) knee pain / OA; May need to rule-out possible medial meniscus involvement -   Tape helpful - No pain w/o tape today -     Progress strengthening /stabilization /functional activity       _________________________________________________    Manual Therapy:                 mins  26570;  Therapeutic Exercise:    23    mins  61930;      Neuromuscular Michelle:   08     mins  21387;    Therapeutic Activity:     15      mins  37318;     Ultrasound:                          mins  45635;  Iontophoresis:                     mins  46274;    Electrical Stimulation:         mins  05505 ( );  Mechanical Traction:          mins  05730  Dry Needling                       mins self-pay     Timed Treatment:   46    mins                  Total Treatment:     46    mins        Anibal Haywood PT  Physical Therapist  Lic # [id number]

## 2025-04-21 ENCOUNTER — TREATMENT (OUTPATIENT)
Dept: PHYSICAL THERAPY | Facility: CLINIC | Age: 70
End: 2025-04-21
Payer: COMMERCIAL

## 2025-04-21 DIAGNOSIS — Z74.09 IMPAIRED FUNCTIONAL MOBILITY AND ACTIVITY TOLERANCE: ICD-10-CM

## 2025-04-21 DIAGNOSIS — M17.12 PRIMARY OSTEOARTHRITIS OF LEFT KNEE: Primary | ICD-10-CM

## 2025-04-21 PROCEDURE — 97140 MANUAL THERAPY 1/> REGIONS: CPT | Performed by: PHYSICAL THERAPIST

## 2025-04-21 PROCEDURE — 97530 THERAPEUTIC ACTIVITIES: CPT | Performed by: PHYSICAL THERAPIST

## 2025-04-21 PROCEDURE — 97110 THERAPEUTIC EXERCISES: CPT | Performed by: PHYSICAL THERAPIST

## 2025-04-21 NOTE — PROGRESS NOTES
Physical Therapy Daily Note    Patient Name: Becca Alba         :  1955  Referring Physician: Kymberly Flores APRN  Treatment Date: 2025  Date of Initial Visit: No linked episodes    Visit Diagnoses:    ICD-10-CM ICD-9-CM   1. Primary osteoarthritis of left knee  M17.12 715.16   2. Impaired functional mobility and activity tolerance  Z74.09 V49.89     Patient seen for Visit count could not be calculated. Make sure you are using a visit which is associated with an episode. sessions  _____________________________________________________    Subjective   Becca Alba reports: (B) lateral hip pain since last session   Would like knee taped today -     Objective   Tender Glute med region (B) / TFL -     TREATMENT:   MANUAL THERAPY:   [x]   DTM/TPR (B) Glute med / TFL   []     []        EXERCISES:   [x]  QL/ITB/TFL SL Sretch (B) 2 min ea   [x]  SUPINE HF Stretch off Side of bed x 2 min R/L  [x]   SUPINE QUAD STRETCH off side of bed x 2 min R/L  []     []     []   SIT to STAND / squat - butt touch x 15  []   SIDESTEPPING R/L  []   STAIRS up/Down Reciprocally w/ HR prn -2 flights  []   JARED HIP ABD 50#  15, 10 reps  []   JARED LEG PRESS 140# 2x 15  []   FWD / RUNNER STEP UP  Hole 4 from bottom x 15  []   LATERAL STEP UP w/ ABDuction Hole 3 from bottom x 15  []   SIDE STEP SQUATS 30 ft R/L  []   SLS (L) 2x 1 min with opp LE movement / challenges  []   BALANCE BOARD Frnt/Bk;  Side / Side x 2 min ea  []   HIP ER STRETCH (R)  []   SL HIP ABD (R)  []   NUSTEP SEAT 6  ARMS 10  L7 x 10 min  []   UPDATED HEP and REVIEWED w/ PATIENT     FUNCTIONAL ACTIVITIES:   TAPING / BRACING: K-tape to  [x]   1) Unload PF jt and [x]   2) Medial and Lat jt line x 1 strip ea (L) -  SEE EXERCISEs -          Assessment/Plan  70 yo female: (L) knee pain / OA; May need to rule-out possible medial meniscus involvement -   Hip / Glute med/ TFL strain (B)     Progress strengthening /stabilization /functional activity -  resume full program as tolerated -        _________________________________________________    Manual Therapy:            23     mins  82874;  Therapeutic Exercise:    10    mins  44305;     Neuromuscular Michelle:   02     mins  75661;    Therapeutic Activity:     08      mins  25236;     Ultrasound:                          mins  13257;  Iontophoresis:                     mins  71676;    Electrical Stimulation:         mins  71833 ( );  Mechanical Traction:          mins  48650  Dry Needling                       mins self-pay     Timed Treatment:   43    mins                  Total Treatment:     43    mins        Anibal Haywood PT  Physical Therapist  Lic # [id number]

## 2025-04-24 ENCOUNTER — TREATMENT (OUTPATIENT)
Dept: PHYSICAL THERAPY | Facility: CLINIC | Age: 70
End: 2025-04-24
Payer: COMMERCIAL

## 2025-04-24 DIAGNOSIS — Z74.09 IMPAIRED FUNCTIONAL MOBILITY AND ACTIVITY TOLERANCE: ICD-10-CM

## 2025-04-24 DIAGNOSIS — M17.12 PRIMARY OSTEOARTHRITIS OF LEFT KNEE: Primary | ICD-10-CM

## 2025-04-24 NOTE — PROGRESS NOTES
Physical Therapy Daily Note    Patient Name: Becca Alba         :  1955  Referring Physician: Kymberly Flores APRN  Treatment Date: 2025  Date of Initial Visit: Type: THERAPY  Noted: 3/5/2025    Visit Diagnoses:    ICD-10-CM ICD-9-CM   1. Primary osteoarthritis of left knee  M17.12 715.16   2. Impaired functional mobility and activity tolerance  Z74.09 V49.89     Patient seen for 8 sessions  _____________________________________________________    Subjective   Becca Alba reports: hips less sore - trying PT w/o tape today - not doing bar class fully yet -     Objective   Pt demonstrating good mobility w/ PT activities w/ no increased pain -     TREATMENT:   MANUAL THERAPY:   []   DTM/TPR (B) Glute med / TFL   []     []        EXERCISES:   []  QL/ITB/TFL SL Sretch (B) 2 min ea   []  SUPINE HF Stretch off Side of bed x 2 min R/L  []   SUPINE QUAD STRETCH off side of bed x 2 min R/L  []     []     [x]   SIT to STAND / squat - butt touch x 15  [x]   SIDESTEPPING R/L  []   STAIRS up/Down Reciprocally w/ HR prn -2 flights  [x]   JARED HIP ABD 50#  15, 10 reps  [x]   JARED LEG PRESS 150# 2x 15  [x]   FWD / RUNNER STEP UP  Hole 4 from bottom x 15  [x]   LATERAL STEP UP w/ ABDuction Hole 5 from bottom x 15  []   SIDE STEP SQUATS 30 ft R/L  [x]   SLS (L) 2x 1 min with opp LE movement / challenges  [x]   BALANCE BOARD Frnt/Bk;  Side / Side x 2 min ea  []   HIP ER STRETCH (R)  []   SL HIP ABD (R)  [x]   NUSTEP SEAT 6  ARMS 10  L7 x 10 min  [x]   UPDATED HEP and REVIEWED w/ PATIENT    NMR: Verbal and tactile cues to facilitate core, posture,  quad, glute, hip / LE  musculature statically and dynamically. - Balance / proprioception activities -  -        FUNCTIONAL ACTIVITIES:   TAPING / BRACING: K-tape to  []   1) Unload PF jt and []   2) Medial and Lat jt line x 1 strip ea (L) -  SEE EXERCISEs -      Assessment/Plan  70 yo female: (L) knee pain / OA; May need to rule-out possible medial meniscus  involvement -   Hip / Glute med/ TFL strain (B)     Progress strengthening /stabilization /functional activity       _________________________________________________    Manual Therapy:                 mins  14951;  Therapeutic Exercise:    20    mins  93681;     Neuromuscular Michelle:   08     mins  13213;    Therapeutic Activity:     15      mins  52623;     Ultrasound:                          mins  71243;  Iontophoresis:                     mins  03128;    Electrical Stimulation:         mins  39304 ( );  Mechanical Traction:          mins  99796  Dry Needling                       mins self-pay     Timed Treatment:   43    mins                  Total Treatment:     43    mins        Anibal Haywood PT  Physical Therapist  Lic # [id number]

## 2025-05-01 ENCOUNTER — TREATMENT (OUTPATIENT)
Dept: PHYSICAL THERAPY | Facility: CLINIC | Age: 70
End: 2025-05-01
Payer: COMMERCIAL

## 2025-05-01 DIAGNOSIS — Z74.09 IMPAIRED FUNCTIONAL MOBILITY AND ACTIVITY TOLERANCE: ICD-10-CM

## 2025-05-01 DIAGNOSIS — M17.12 PRIMARY OSTEOARTHRITIS OF LEFT KNEE: Primary | ICD-10-CM

## 2025-05-01 NOTE — PROGRESS NOTES
Physical Therapy Daily Note    Patient Name: Becca Alba         :  1955  Referring Physician: Kymberly Flores APRN  Treatment Date: 2025  Date of Initial Visit: Type: THERAPY  Noted: 3/5/2025    Visit Diagnoses:    ICD-10-CM ICD-9-CM   1. Primary osteoarthritis of left knee  M17.12 715.16   2. Impaired functional mobility and activity tolerance  Z74.09 V49.89     Patient seen for 9 sessions  _____________________________________________________    Subjective   Becca Alba reports: feel her knee at times, but not keeping her from doing anything -   Mom in ER over weekend -   Hips doing alright, only intermittent discomfort if she moves the wrong way -     Objective   Pt ambulating     TREATMENT:   MANUAL THERAPY:   []   DTM/TPR (B) Glute med / TFL   []     []        EXERCISES:   []  QL/ITB/TFL SL Sretch (B) 2 min ea   []  SUPINE HF Stretch off Side of bed x 2 min R/L  []   SUPINE QUAD STRETCH off side of bed x 2 min R/L  []     []     [x]   SIT to STAND / squat - butt touch x 15  []   SIDESTEPPING R/L  [x]   STAIRS up/Down Reciprocally w/ HR prn -2 flights  [x]   JARED HIP ABD 50#  15, 10 reps  [x]   JARED LEG PRESS 150# 2x 15  [x]   FWD / RUNNER STEP UP  Hole 4 from bottom x 15  [x]   LATERAL STEP UP w/ ABDuction Hole 3 from bottom x 15  []   SIDE STEP SQUATS 30 ft R/L  [x]   SLS (L) 2x 1 min with opp LE movement / challenges  [x]   BALANCE BOARD Frnt/Bk;  Side / Side x 2 min ea  [x]   STANDING LEG PRESS 0 plates 2x 12 (Pt noted increased medial knee pain (L) after  []   HIP ER STRETCH (R)  []   SL HIP ABD (R)  [x]   NUSTEP SEAT 6  ARMS 10  L7 x 10 min  [x]   UPDATED HEP and REVIEWED w/ PATIENT     NMR: Verbal and tactile cues to facilitate core, posture,  quad, glute, hip / LE  musculature statically and dynamically. - Balance / proprioception activities -  -         FUNCTIONAL ACTIVITIES:   TAPING / BRACING: K-tape to      []   1) Unload PF jt      [x]   2) Unload Medial  jt line x 2  strip  (L) -  SEE EXERCISEs -      Assessment/Plan  70 yo female: (L) knee pain / OA; May need to rule-out possible medial meniscus involvement -   Hip / Glute med/ TFL strain (B) - resolved    Standing leg press/ squat - increased medial knee pain (L)     Progress strengthening /stabilization /functional activity - progress to BAR class and HEP if continued improvement -     _________________________________________________     Manual Therapy:                 mins  55276;  Therapeutic Exercise:    23    mins  99650;     Neuromuscular Michelle:   08     mins  88128;    Therapeutic Activity:     14      mins  82319;     Ultrasound:                          mins  68018;  Iontophoresis:                     mins  75116;    Electrical Stimulation:         mins  73039 ( );  Mechanical Traction:          mins  60475  Dry Needling                       mins self-pay     Timed Treatment:   45    mins                  Total Treatment:     45    mins           Anibal Haywood PT  Physical Therapist  Lic # [id number]

## 2025-05-07 RX ORDER — VALACYCLOVIR HYDROCHLORIDE 500 MG/1
500 TABLET, FILM COATED ORAL 2 TIMES DAILY
Qty: 30 TABLET | Refills: 5 | Status: SHIPPED | OUTPATIENT
Start: 2025-05-07

## 2025-05-08 ENCOUNTER — TREATMENT (OUTPATIENT)
Dept: PHYSICAL THERAPY | Facility: CLINIC | Age: 70
End: 2025-05-08
Payer: COMMERCIAL

## 2025-05-08 DIAGNOSIS — M17.12 PRIMARY OSTEOARTHRITIS OF LEFT KNEE: Primary | ICD-10-CM

## 2025-05-08 DIAGNOSIS — Z74.09 IMPAIRED FUNCTIONAL MOBILITY AND ACTIVITY TOLERANCE: ICD-10-CM

## 2025-05-08 NOTE — PROGRESS NOTES
Physical Therapy Daily Note    Patient Name: Becca Alba         :  1955  Referring Physician: Kymberly Flores APRN  Treatment Date: 2025  Date of Initial Visit: Type: THERAPY  Noted: 3/5/2025    Visit Diagnoses:    ICD-10-CM ICD-9-CM   1. Primary osteoarthritis of left knee  M17.12 715.16   2. Impaired functional mobility and activity tolerance  Z74.09 V49.89     Patient seen for 10 sessions  _____________________________________________________    Subjective   Becca Alba reports: Aches at times at rest - taking mobic - some times notes discomfort w/ stairs - no f/u appt w/ SUMEET Lopez -   Pain mostly medial knee -  Can't wear inserts for shoes -   H/O Napoles's Neuroma sx x 2 -     Objective   Hallux valgus and pronation (B) feet -    TREATMENT:   MANUAL THERAPY:   []   DTM/TPR (B) Glute med / TFL   []     []        EXERCISES:   []  QL/ITB/TFL SL Sretch (B) 2 min ea   []  SUPINE HF Stretch off Side of bed x 2 min R/L  []   SUPINE QUAD STRETCH off side of bed x 2 min R/L  []     []     [x]   SIT to STAND / squat - butt touch x 15  []   SIDESTEPPING R/L  [x]   STAIRS up/Down Reciprocally w/ HR prn -2 flights  [x]   JARED HIP ABD 50#  15, 10 reps  [x]   JARED LEG PRESS 150# 2x 15  [x]   FWD / RUNNER STEP UP  Hole 4 from bottom x 15  [x]   LATERAL STEP UP w/ ABDuction Hole 3 from bottom x 15  []   SIDE STEP SQUATS 30 ft R/L  []   SLS (L) 2x 1 min with opp LE movement / challenges  []   BALANCE BOARD Frnt/Bk;  Side / Side x 2 min ea  []   STANDING LEG PRESS 0 plates 2x 12 (Pt noted increased medial knee pain (L) after  []   HIP ER STRETCH (R)  []   SL HIP ABD (R)  [x]   NUSTEP SEAT 6  ARMS 10  L7 x 10 min  []   UPDATED HEP and REVIEWED w/ PATIENT     []   NMR: Verbal and tactile cues to facilitate core, posture,  quad, glute, hip / LE  musculature statically and dynamically. - Balance / proprioception activities -  -         FUNCTIONAL ACTIVITIES:   TAPING / BRACING: K-tape to       []   1) Unload PF jt      [x]   2) Unload Medial  jt line x 2 strip  (L) -w/ X-strip medially  SEE EXERCISEs -   Discussed in depth treatment options for foot issues / morelos's neuroma, hallux valgus via custom orthotics, shoe options, toe spacer, etc and about seeking care from Ortho / Dr. Traore -        Assessment/Plan  70 yo female: (L) knee pain / OA; May need to rule-out possible medial meniscus involvement -   Hip / Glute med/ TFL strain (B) - resolved  Pt may benefit from custom orthotics - to reduce stress to medial knees, etc.     Progress strengthening /stabilization /functional activity       _________________________________________________    Manual Therapy:                 mins  27719;  Therapeutic Exercise:   23    mins  96082;     Neuromuscular Michelle:   02     mins  01229;    Therapeutic Activity:     23     mins  87840;     Ultrasound:                          mins  04980;  Iontophoresis:                     mins  24182;    Electrical Stimulation:         mins  37493 ( );  Mechanical Traction:          mins  65939  Dry Needling                       mins self-pay     Timed Treatment:   48    mins                  Total Treatment:     48    mins        Anibal Haywood PT  Physical Therapist  Lic # [id number]

## 2025-05-12 ENCOUNTER — TREATMENT (OUTPATIENT)
Dept: PHYSICAL THERAPY | Facility: CLINIC | Age: 70
End: 2025-05-12
Payer: COMMERCIAL

## 2025-05-12 DIAGNOSIS — Z74.09 IMPAIRED FUNCTIONAL MOBILITY AND ACTIVITY TOLERANCE: ICD-10-CM

## 2025-05-12 DIAGNOSIS — M17.12 PRIMARY OSTEOARTHRITIS OF LEFT KNEE: Primary | ICD-10-CM

## 2025-05-12 PROCEDURE — 97530 THERAPEUTIC ACTIVITIES: CPT | Performed by: PHYSICAL THERAPIST

## 2025-05-12 PROCEDURE — 97110 THERAPEUTIC EXERCISES: CPT | Performed by: PHYSICAL THERAPIST

## 2025-05-12 PROCEDURE — 97112 NEUROMUSCULAR REEDUCATION: CPT | Performed by: PHYSICAL THERAPIST

## 2025-05-12 NOTE — PROGRESS NOTES
Physical Therapy Daily Note    Patient Name: Becca Alba         :  1955  Referring Physician: Kymberly Flores APRN  Treatment Date: 2025  Date of Initial Visit: Type: THERAPY  Noted: 3/5/2025    Visit Diagnoses:    ICD-10-CM ICD-9-CM   1. Primary osteoarthritis of left knee  M17.12 715.16   2. Impaired functional mobility and activity tolerance  Z74.09 V49.89     Patient seen for 11 sessions  _____________________________________________________    Subjective   Becca Alba reports: knee did well over weekend - took tape off  morning and then did a lot of house work after - up/downs a lot, sweeping/mopping, and spring cleaning bedroom, under bed, closet after tape removed -   Increased pain med/lat and superior knee - since      Objective   Ambulating w/o AD - good AROM -     TREATMENT:   MANUAL THERAPY:   []   DTM/TPR (B) Glute med / TFL   []     []        EXERCISES:   []  QL/ITB/TFL SL Sretch (B) 2 min ea   []  SUPINE HF Stretch off Side of bed x 2 min R/L  []   SUPINE QUAD STRETCH off side of bed x 2 min R/L  []     []     [x]   SIT to STAND / squat - butt touch x 15  []   SIDESTEPPING R/L  [x]   STAIRS up/Down Reciprocally w/ HR prn -2 flights  [x]   JARED HIP ABD 45# 2x15 reps  [x]   JARED LEG PRESS 135, 150#  15 ea  [x]   FWD / RUNNER STEP UP  Hole 4 from bottom x 15  [x]   LATERAL STEP UP w/ ABDuction Hole 3 from bottom x 15  []   SIDE STEP SQUATS 30 ft R/L  [x]   SLS (L) 2x 1 min with opp LE movement / challenges  []   BALANCE BOARD Frnt/Bk;  Side / Side x 2 min ea  []   STANDING LEG PRESS 0 plates 2x 12 (Pt noted increased medial knee pain (L) after  []   HIP ER STRETCH (R)  []   SL HIP ABD (R)  [x]   NUSTEP SEAT 6  ARMS 10  L7 x 10 min  []   UPDATED HEP and REVIEWED w/ PATIENT     [x]   NMR: Verbal and tactile cues to facilitate core, posture,  quad, glute, hip / LE  musculature statically and dynamically. - Balance / proprioception activities -  -         FUNCTIONAL  ACTIVITIES:   TAPING / BRACING: K-tape to      [x]   1) Unload PF jt      [x]   2) Unload Medial  jt line x 2 strip  (L) -w/ X-strip medially and lateral knee joint   EDUCATED Pt on SELF TAPING form MEDIAL KNEE and how to use tape, etc  SEE EXERCISEs -      Assessment/Plan  70 yo female: (L) knee pain / OA; May need to rule-out possible medial meniscus involvement -   Hip / Glute med/ TFL strain (B) - resolved  Pt may benefit from custom orthotics - to reduce stress to medial knees, etc.   Flare due to over activity w/o tape -     Progress strengthening /stabilization /functional activity       _________________________________________________    Manual Therapy:                 mins  87635;  Therapeutic Exercise:    15    mins  14593;     Neuromuscular Michelle:   08     mins  64524;    Therapeutic Activity:     23      mins  63341;     Ultrasound:                          mins  14560;  Iontophoresis:                     mins  08513;    Electrical Stimulation:         mins  77491 ( );  Mechanical Traction:          mins  57974  Dry Needling                       mins self-pay     Timed Treatment:   46    mins                  Total Treatment:     46    mins        Anibal Haywood, PT  Physical Therapist  Lic # [id number]

## 2025-05-22 ENCOUNTER — TREATMENT (OUTPATIENT)
Dept: PHYSICAL THERAPY | Facility: CLINIC | Age: 70
End: 2025-05-22
Payer: COMMERCIAL

## 2025-05-22 DIAGNOSIS — Z74.09 IMPAIRED FUNCTIONAL MOBILITY AND ACTIVITY TOLERANCE: ICD-10-CM

## 2025-05-22 DIAGNOSIS — M25.559 HIP PAIN, UNSPECIFIED LATERALITY: ICD-10-CM

## 2025-05-22 DIAGNOSIS — M17.12 PRIMARY OSTEOARTHRITIS OF LEFT KNEE: Primary | ICD-10-CM

## 2025-05-22 NOTE — PROGRESS NOTES
Physical Therapy Daily Note    Patient Name: Becca Alba         :  1955  Referring Physician: Kymberly Flores APRN  Treatment Date: 2025  Date of Initial Visit: Type: THERAPY  Noted: 3/5/2025    Visit Diagnoses:    ICD-10-CM ICD-9-CM   1. Primary osteoarthritis of left knee  M17.12 715.16   2. 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   Heat helpful -     Objective   Very tender TFL and Piriformis w/ multiple TPs -   (-) hip scour, earnestine, fadir -   Good strength Hip     MANUAL THERAPY:   []   DTM/TPR (B) Glute med / TFL   []     []        EXERCISES:   []  QL/ITB/TFL SL Sretch (B) 2 min ea   []  SUPINE HF Stretch off Side of bed x 2 min R/L  []   SUPINE QUAD STRETCH off side of bed x 2 min R/L  []     []     [x]   SIT to STAND / squat - butt touch x 15  []   SIDESTEPPING R/L  [x]   STAIRS up/Down Reciprocally w/ HR prn -2 flights  [x]   JARED HIP ABD 45# 2x15 reps  [x]   JARED LEG PRESS 135, 150#  15 ea  [x]   FWD / RUNNER STEP UP  Hole 4 from bottom x 15  [x]   LATERAL STEP UP w/ ABDuction Hole 3 from bottom x 15  []   SIDE STEP SQUATS 30 ft R/L  [x]   SLS (L) 2x 1 min with opp LE movement / challenges  []   BALANCE BOARD Frnt/Bk;  Side / Side x 2 min ea  []   STANDING LEG PRESS 0 plates 2x 12 (Pt noted increased medial knee pain (L) after  []   HIP ER STRETCH (R)  []   SL HIP ABD (R)  [x]   NUSTEP SEAT 6  ARMS 10  L7 x 10 min  []   UPDATED HEP and REVIEWED w/ PATIENT     [x]   NMR: Verbal and tactile cues to facilitate core, posture,  quad, glute, hip / LE  musculature statically and dynamically. - Balance / proprioception activities -  -       Functional / Therapeutic Activities:    TAPING / BRACING: K-tape to      [x]   1) Unload PF jt      [x]   2) Unload Medial  jt line x 2 strip  (L) -w/ X-strip medially and lateral knee joint   SEE EXERCISEs -   HIP ASSESSMENT   Jt protection, ADL modification; Posture and      Assessment/Plan  70 yo female: (L) knee pain / OA; May need to rule-out possible medial meniscus involvement -   Hip / Glute med/ TFL strain (B) - resolved  Pt may benefit from custom orthotics - to reduce stress to medial knees, etc.   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     Progress strengthening /stabilization /functional activity       _________________________________________________    Manual Therapy:            08     mins  21934;  Therapeutic Exercise:    08    mins  67925;     Neuromuscular Michelle:   08     mins  92136;    Therapeutic Activity:     20      mins  99960;     Ultrasound:                          mins  37552;  Iontophoresis:                     mins  30093;    Electrical Stimulation:         mins  03263 ( );  Mechanical Traction:          mins  33099  Dry Needling                       mins self-pay     Timed Treatment:   44    mins                  Total Treatment:     44    mins        Anibal Haywood, PT  Physical Therapist  Lic # [id number]    Access Code: [email or web address]  URL: [email or web address]  Date: 05/22/2025  Prepared by: Tim Haywood    Exercises  - Side Stepping with Resistance at Thighs  - 1 x daily - 3-4 x weekly - 1 sets - 3 reps - 30-40 feet - right / left  - Sit to Stand Without Arm Support  - 1 x daily - 7 x weekly - 1-2 sets - 10-15 reps - 3-5 hold  - Modified Earl Stretch  - 3 x daily - 7 x weekly - 1 sets - 2-3 reps - 1 min hold  - Runner's Step Up/Down  - 1 x daily - 3-4 x weekly - 1 sets - 10-15 reps  - Lateral Step Up  - 1 x daily - 3-4 x weekly - 1 sets - 15-20 reps  - SUPINE BRIDGE w/ GLUTEAL Set on heels  - 1 x daily - 3-4 x weekly - 1-2 sets - 15 reps -  3-5sec hold  - Forward Monster Walks  - 1 x daily - 3-4 x weekly - 1 sets - 2-3 reps - 30-40 feet  - Single Leg Stance  - 1-2 x daily - 3-4 x weekly - 1 sets - 1-2 min hold  - Sidelying Hip Abduction  - 1 x daily - 7 x weekly - 1-2 sets - 10-15 reps - 3 hold  - Clamshell with Resistance  - 1 x daily - 7 x weekly - 1-2 sets - 15 reps - 5 sec hold  - Supine Piriformis Stretch with Foot on Ground  - 2-3 x daily - 7 x weekly - 1 sets - 2 reps - 60s hold

## 2025-06-03 ENCOUNTER — TREATMENT (OUTPATIENT)
Dept: PHYSICAL THERAPY | Facility: CLINIC | Age: 70
End: 2025-06-03
Payer: COMMERCIAL

## 2025-06-03 DIAGNOSIS — Z74.09 IMPAIRED FUNCTIONAL MOBILITY AND ACTIVITY TOLERANCE: ICD-10-CM

## 2025-06-03 DIAGNOSIS — M17.12 PRIMARY OSTEOARTHRITIS OF LEFT KNEE: Primary | ICD-10-CM

## 2025-06-03 PROCEDURE — 97530 THERAPEUTIC ACTIVITIES: CPT | Performed by: PHYSICAL THERAPIST

## 2025-06-03 PROCEDURE — 97112 NEUROMUSCULAR REEDUCATION: CPT | Performed by: PHYSICAL THERAPIST

## 2025-06-03 PROCEDURE — 97110 THERAPEUTIC EXERCISES: CPT | Performed by: PHYSICAL THERAPIST

## 2025-06-03 NOTE — PROGRESS NOTES
Physical Therapy Daily Note    Patient Name: Becca Alba         :  1955  Referring Physician: Kymberly Flores APRN  Treatment Date: 6/3/2025  Date of Initial Visit: Type: THERAPY  Noted: 3/5/2025    Visit Diagnoses:    ICD-10-CM ICD-9-CM   1. Primary osteoarthritis of left knee  M17.12 715.16   2. Impaired functional mobility and activity tolerance  Z74.09 V49.89     Patient seen for 13 sessions  _____________________________________________________    Subjective   Becca Alba reports: knee doing well - (R) hip bothers her after sitting and rising - and getting started walking - pain deep (R) hip - worse after prolonged sitting / immobilizer -   H/O Hip OA -   Brought orthotics she has w/ metatarsal pad added by Podiatrist to address morelos's neuroma that she had surgery on x 2 - more painful in (L) foot due to shoe not being wide enough and metatarsal pad not in correct position - when taped to her foot it was much better -     No tape needed today - been feeling good without it for the last few days -     Objective   Pt ambulating w/o AD -     MANUAL THERAPY:   []   DTM/TPR (B) Glute med / TFL   []     []        EXERCISES:   [x]  QL/ITB/TFL SL Sretch (R) 2 min ea   [x]  SUPINE HF Stretch off Side of bed x 2 min R/L  []   SUPINE QUAD STRETCH off side of bed x 2 min R/L  [x]   FIG 4 PIRIFORMIS STRETCH w/ foot on ground x 1 min  [x]   FIG 4 HIP ER STRETCH x 1 min ea  [x]   SIT to STAND / squat - butt touch x 15  [x]   SIDESTEPPING R/L  [x]   STAIRS up/Down Reciprocally w/ HR prn -2 flights  [x]   JARED HIP ABD 45# 2x15 reps  [x]   JARED LEG PRESS 135, 150#  15 ea  [x]   FWD / RUNNER STEP UP  Hole 4 from bottom x 15  [x]   LATERAL STEP UP w/ ABDuction Hole 3 from bottom x 15  []   SIDE STEP SQUATS 30 ft R/L  [x]   SLS (L) 2x 1 min with opp LE movement / challenges  []   BALANCE BOARD Frnt/Bk;  Side / Side x 2 min ea  []     [x]   HIP ER STRETCH (R)  []   SL HIP ABD (R)  [x]   NUSTEP SEAT 6   ARMS 10  L7 x 10 min  []   UPDATED HEP and REVIEWED w/ PATIENT     [x]   NMR: Verbal and tactile cues to facilitate core, posture,  quad, glute, hip / LE  musculature statically and dynamically. - Balance / proprioception activities -  -      Functional / Therapeutic Activities:    TAPING / BRACING: K-tape to      []   1) Unload PF jt      []   2) Unload Medial  jt line x 2 strip  (L) -w/ X-strip medially and lateral knee joint   SEE EXERCISEs -   Assessed orthotics    Assessment/Plan  70 yo female: (L) knee pain / OA; May need to rule-out possible medial meniscus involvement -   Hip pain / hip OA R>L -  Pt may benefit from custom orthotics - to reduce stress to medial knees, etc.    Progress strengthening /stabilization /functional activity       _________________________________________________    Manual Therapy:                 mins  01272;  Therapeutic Exercise:   15    mins  85025;     Neuromuscular Michelle:   08     mins  83752;    Therapeutic Activity:      23      mins  24418;     Ultrasound:                          mins  46878;  Iontophoresis:                     mins  96165;    Electrical Stimulation:         mins  82261 ( );  Mechanical Traction:          mins  50376  Dry Needling                       mins self-pay     Timed Treatment:   46    mins                  Total Treatment:     46    mins        Anibal Haywood PT  Physical Therapist  Lic # [id number]

## 2025-06-05 ENCOUNTER — TREATMENT (OUTPATIENT)
Dept: PHYSICAL THERAPY | Facility: CLINIC | Age: 70
End: 2025-06-05
Payer: COMMERCIAL

## 2025-06-05 DIAGNOSIS — M25.559 HIP PAIN, UNSPECIFIED LATERALITY: ICD-10-CM

## 2025-06-05 DIAGNOSIS — Z74.09 IMPAIRED FUNCTIONAL MOBILITY AND ACTIVITY TOLERANCE: ICD-10-CM

## 2025-06-05 DIAGNOSIS — M17.12 PRIMARY OSTEOARTHRITIS OF LEFT KNEE: Primary | ICD-10-CM

## 2025-06-05 NOTE — PROGRESS NOTES
Physical Therapy Daily Note    Patient Name: Becca Alba         :  1955  Referring Physician: Kymberly Flores APRN  Treatment Date: 2025  Date of Initial Visit: Type: THERAPY  Noted: 3/5/2025    Visit Diagnoses:    ICD-10-CM ICD-9-CM   1. Primary osteoarthritis of left knee  M17.12 715.16   2. Impaired functional mobility and activity tolerance  Z74.09 V49.89   3. Hip pain, unspecified laterality  M25.559 719.45     Patient seen for 14 sessions  _____________________________________________________    Subjective   Becca Alba reports: knee not too bad - no need for tape on knee as it is doing well - no problem w/o it since last session -   (R) hip sore / stiff after sitting -     Objective   Pt ambulating w/o AD -     MANUAL THERAPY:   []   DTM/TPR (B) Glute med / TFL   []     []        EXERCISES:   [x]  QL/ITB/TFL SL Sretch (R) 2 min ea   [x]  SUPINE HF Stretch off Side of bed x 2 min R/L  []   SUPINE QUAD STRETCH off side of bed x 2 min R/L  [x]   FIG 4 PIRIFORMIS STRETCH w/ foot on ground x 1 min  [x]   FIG 4 HIP ER STRETCH x 1 min ea  [x]   SIT to STAND / squat - butt touch x 15  []   SIDESTEPPING R/L  []   STAIRS up/Down Reciprocally w/ HR prn -2 flights  [x]   JARED HIP ABD 55# 2x10 reps  [x]   JARED HIP ADD  85# 2x15  [x]   JARED LEG PRESS 150#  2x15 ea  [x]   FWD / RUNNER STEP UP  Hole 4 from bottom x 15 R/L  [x]   LATERAL STEP UP w/ ABDuction Hole 3 from bottom x 15 R/L  []   SIDE STEP SQUATS 30 ft R/L  [x]   SLS (L) 2x 1 min with opp LE movement / challenges  []   BALANCE BOARD Frnt/Bk;  Side / Side x 2 min ea  []     []     []   SL HIP ABD (R)  [x]   NUSTEP SEAT 6  ARMS 10  L7 x 10 min  []   UPDATED HEP and REVIEWED w/ PATIENT     [x]   NMR: Verbal and tactile cues to facilitate core, posture,  quad, glute, hip / LE  musculature statically and dynamically. - Balance / proprioception activities -  -      Functional / Therapeutic Activities:    TAPING / BRACING: K-tape to       []   1) Unload PF jt      []   2) Unload Medial  jt line x 2 strip  (L) -w/ X-strip medially and lateral knee joint   SEE EXERCISEs -        Assessment/Plan  68 yo female: (L) knee pain / OA; May need to rule-out possible medial meniscus involvement -   Hip pain / hip OA R>L -  Pt may benefit from custom orthotics - to reduce stress to medial knees, etc.    Progress strengthening /stabilization /functional activity       _________________________________________________    Manual Therapy:                 mins  90501;  Therapeutic Exercise:   15    mins  54040;     Neuromuscular Michelle:   08     mins  53446;    Therapeutic Activity:      23      mins  30919;     Ultrasound:                          mins  38752;  Iontophoresis:                     mins  35305;    Electrical Stimulation:         mins  67483 ( );  Mechanical Traction:          mins  65350  Dry Needling                       mins self-pay     Timed Treatment:   46    mins                  Total Treatment:     46    mins        Anibal Haywood PT  Physical Therapist  Lic # [id number]

## 2025-06-14 DIAGNOSIS — F41.9 ANXIETY: ICD-10-CM

## 2025-06-16 RX ORDER — LORAZEPAM 0.5 MG/1
0.5 TABLET ORAL EVERY 8 HOURS PRN
Qty: 90 TABLET | Refills: 0 | Status: SHIPPED | OUTPATIENT
Start: 2025-06-16

## 2025-06-25 ENCOUNTER — TREATMENT (OUTPATIENT)
Dept: PHYSICAL THERAPY | Facility: CLINIC | Age: 70
End: 2025-06-25
Payer: COMMERCIAL

## 2025-06-25 DIAGNOSIS — M25.559 HIP PAIN, UNSPECIFIED LATERALITY: ICD-10-CM

## 2025-06-25 DIAGNOSIS — Z74.09 IMPAIRED FUNCTIONAL MOBILITY AND ACTIVITY TOLERANCE: ICD-10-CM

## 2025-06-25 DIAGNOSIS — M17.12 PRIMARY OSTEOARTHRITIS OF LEFT KNEE: Primary | ICD-10-CM

## 2025-06-25 NOTE — PROGRESS NOTES
Physical Therapy Daily Note    Patient Name: Becca Alba         :  1955  Referring Physician: Kymberly Flores APRN  Treatment Date: 2025  Date of Initial Visit: Type: THERAPY  Noted: 3/5/2025    Visit Diagnoses:    ICD-10-CM ICD-9-CM   1. Primary osteoarthritis of left knee  M17.12 715.16   2. Impaired functional mobility and activity tolerance  Z74.09 V49.89   3. Hip pain, unspecified laterality  M25.559 719.45     Patient seen for 15 sessions  _____________________________________________________    Subjective   Becca Alba reports: knee was doing very well until today - did 2 classes back to back - increased pain medial knee and int pain mid/med quad -   Noted pain from buttock and down LLE when doing certain knee ext exercises on  and when doing HF Stretch on (R) -     NEW: (R) ant/lat ankle and up distal ant/lat tib since long drive -    Objective   Tender along ant tib mm and p    MANUAL THERAPY:   []   DTM/TPR Ant tibialis mm belly  []     []        EXERCISES:   []  QL/ITB/TFL SL Sretch (R) 2 min ea   []  SUPINE HF Stretch off Side of bed x 2 min R/L  []   SUPINE QUAD STRETCH off side of bed x 2 min R/L  []   FIG 4 PIRIFORMIS STRETCH w/ foot on ground x 1 min  []   FIG 4 HIP ER STRETCH x 1 min ea  [x]   SIT to STAND / squat - butt touch x 15  []   SIDESTEPPING R/L  [x]   STAIRS up/Down Reciprocally w/ HR prn -2 flights  [x]   JARED HIP ABD 55# 2x10 reps  [x]   JARED HIP ADD  85# 2x15  [x]   JARED LEG PRESS 150#  2x15 ea  [x]   FWD / RUNNER STEP UP  Hole 4 from bottom x 15 R/L  [x]   LATERAL STEP UP w/ ABDuction Hole 3 from bottom x 15 R/L  []   SIDE STEP SQUATS 30 ft R/L  []   SLS (L) 2x 1 min with opp LE movement / challenges  []   BALANCE BOARD Frnt/Bk;  Side / Side x 2 min ea  [x]   STANDING DF w/ ECC Lowering standing w/ back at wall x 15  []     []   SL HIP ABD (R)  [x]   NUSTEP SEAT 6  ARMS 10  L7 x 10 min  []   UPDATED HEP and REVIEWED w/ PATIENT     []   NMR:  Verbal and tactile cues to facilitate core, posture,  quad, glute, hip / LE  musculature statically and dynamically. 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    Progress strengthening /stabilization /functional activity       _________________________________________________    Manual Therapy:            08     mins  82134;  Therapeutic Exercise:    15    mins  96779;     Neuromuscular Michelle:        mins  70560;    Therapeutic Activity:     23      mins  85769;     Ultrasound:                          mins  05423;  Iontophoresis:                     mins  97115;    Electrical Stimulation:         mins  25724 ( );  Mechanical Traction:          mins  23442  Dry Needling                       mins self-pay     Timed Treatment:   46    mins                  Total Treatment:     46    mins        Anibal Haywood, PT  Physical Therapist  Lic # [id number]

## 2025-07-02 ENCOUNTER — OFFICE VISIT (OUTPATIENT)
Dept: INTERNAL MEDICINE | Facility: CLINIC | Age: 70
End: 2025-07-02
Payer: COMMERCIAL

## 2025-07-02 ENCOUNTER — TREATMENT (OUTPATIENT)
Dept: PHYSICAL THERAPY | Facility: CLINIC | Age: 70
End: 2025-07-02
Payer: COMMERCIAL

## 2025-07-02 VITALS
OXYGEN SATURATION: 98 % | DIASTOLIC BLOOD PRESSURE: 72 MMHG | HEART RATE: 70 BPM | WEIGHT: 138 LBS | BODY MASS INDEX: 24.45 KG/M2 | HEIGHT: 63 IN | SYSTOLIC BLOOD PRESSURE: 110 MMHG

## 2025-07-02 DIAGNOSIS — M79.652 LEFT THIGH PAIN: Primary | ICD-10-CM

## 2025-07-02 DIAGNOSIS — M17.12 PRIMARY OSTEOARTHRITIS OF LEFT KNEE: Primary | ICD-10-CM

## 2025-07-02 DIAGNOSIS — Z74.09 IMPAIRED FUNCTIONAL MOBILITY AND ACTIVITY TOLERANCE: ICD-10-CM

## 2025-07-02 DIAGNOSIS — M25.559 HIP PAIN, UNSPECIFIED LATERALITY: ICD-10-CM

## 2025-07-02 PROCEDURE — 99213 OFFICE O/P EST LOW 20 MIN: CPT | Performed by: INTERNAL MEDICINE

## 2025-07-02 RX ORDER — MELOXICAM 15 MG/1
15 TABLET ORAL DAILY
Qty: 90 TABLET | Refills: 2 | Status: SHIPPED | OUTPATIENT
Start: 2025-07-02

## 2025-07-02 NOTE — PROGRESS NOTES
Subjective     Becca Alba is a 69 y.o. female who presents with   Chief Complaint   Patient presents with    Lower Extremity Issue       History of Present Illness     Left anterior thigh pain.  Going on two day.  No swelling.  Intermittent.  No rash.  No injury.      Review of Systems   Respiratory: Negative.     Cardiovascular: Negative.    Neurological:  Negative for weakness.       The following portions of the patient's history were reviewed and updated as appropriate: allergies, current medications and problem list.    Patient Active Problem List    Diagnosis Date Noted    Colon polyps 07/06/2023    Diverticulosis 07/06/2023    Vegetarian diet:  CHECK B12 12/09/2022    History of hysterectomy with bilateral oophorectomy 06/13/2022     Note Last Updated: 11/23/2022     Formatting of this note might be different from the original.  06/06/22 [facility]      S/P bilateral mastectomy 03/26/2021     Note Last Updated: 11/23/2022     Formatting of this note might be different from the original.  5/17/2021 Bilateral Mastectomy      Cancer of right breast, stage 1, estrogen receptor positive 02/17/2021    Other insomnia 11/25/2019    Hyperlipidemia 04/25/2016     Note Last Updated: 11/25/2019     2.9% ASCVD risk on 11/25/2019      Leukopenia 04/25/2016     Note Last Updated: 4/25/2016     Description: chronic s/p CBC evaluation.      Osteoporosis 04/25/2016     Note Last Updated: 12/7/2021     Fosamax caused stomach upset.  Reclast given for five years.        Uncomplicated asthma 04/25/2016       Current Outpatient Medications on File Prior to Visit   Medication Sig Dispense Refill    acetaminophen (TYLENOL) 500 MG tablet Take 2 tablets by mouth Every 6 (Six) Hours As Needed for Mild Pain.      exemestane (AROMASIN) 25 MG tablet Take 1 tablet by mouth Daily.      LORazepam (ATIVAN) 0.5 MG tablet TAKE 1 TABLET BY MOUTH EVERY 8 (EIGHT) HOURS AS NEEDED FOR ANXIETY 90 tablet 0    meloxicam (MOBIC) 15 MG tablet TAKE  "1 TABLET BY MOUTH DAILY 90 tablet 2    valACYclovir (VALTREX) 500 MG tablet Take 1 tablet by mouth 2 (Two) Times a Day. 30 tablet 5    VITAMIN D PO Take 1 capsule by mouth Daily.      [DISCONTINUED] meloxicam (MOBIC) 15 MG tablet TAKE ONE TABLET BY MOUTH DAILY 90 tablet 2     No current facility-administered medications on file prior to visit.       Objective     /72   Pulse 70   Ht 160 cm (62.99\")   Wt 62.6 kg (138 lb)   SpO2 98%   BMI 24.45 kg/m²     Physical Exam  Constitutional:       Appearance: She is well-developed.   HENT:      Head: Normocephalic and atraumatic.   Pulmonary:      Effort: Pulmonary effort is normal.   Musculoskeletal:      Left upper leg: Normal.      Left lower leg: Normal.   Neurological:      Mental Status: She is alert and oriented to person, place, and time.   Psychiatric:         Behavior: Behavior normal.     X-rays of the left hip  performed today for following indication:   pain.  X-ray reveal nad.  There is no available x-ray for comparison.  X-ray sent to radiology for official interpretation and findings.        Assessment & Plan   Diagnoses and all orders for this visit:    1. Left thigh pain (Primary)  -     XR Femur 2 View Left (In Office)        Discussion    Patient presents with left thigh pain.   Possible emerging meralgia paresthetica.  X-ray is unremarkable.  I discussed with the patient a trial of conservative management with:   tylenol and rest.  Let me know if not feeling better over the next week or if there is any change in symptoms.              Future Appointments   Date Time Provider Department Center   7/7/2025  8:30 AM Fran Urbina PT [facility]   7/9/2025  9:15 AM Tim Haywood PT [facility]   7/15/2025  4:45 PM Tim Haywood PT [facility]   7/23/2025  4:00 PM Tim Haywood PT [facility]   8/15/2025  4:00 PM Fran Urbina PT [facility]   12/12/2025  8:10 AM [facility]   12/19/2025  7:45 AM " Shefali Back MD [facility]

## 2025-07-02 NOTE — PROGRESS NOTES
Physical Therapy Daily Note    Patient Name: Becca Alba         :  1955  Referring Physician: Kymberly Flores APRN  Treatment Date: 2025  Date of Initial Visit: Type: THERAPY  Noted: 3/5/2025    Visit Diagnoses:    ICD-10-CM ICD-9-CM   1. Primary osteoarthritis of left knee  M17.12 715.16   2. Impaired functional mobility and activity tolerance  Z74.09 V49.89   3. Hip pain, unspecified laterality  M25.559 719.45     Patient seen for 16 sessions  _____________________________________________________    Subjective   Becca Alba reports: spilled paint and had to clean up and had HS soreness after and tape came off that day - this happened the day of her therapy -       Objective    Tender distal 2/3 ant tib (R)   Tender medial knee jt (R) knee    MANUAL THERAPY:   [x]   DTM/TPR Ant tibialis mm belly  []     []        EXERCISES:   []  QL/ITB/TFL SL Sretch (R) 2 min ea   []  SUPINE HF Stretch off Side of bed x 2 min R/L  []   SUPINE QUAD STRETCH off side of bed x 2 min R/L  []   FIG 4 PIRIFORMIS STRETCH w/ foot on ground x 1 min  []   FIG 4 HIP ER STRETCH x 1 min ea  []   SIT to STAND / squat - butt touch x 15  []   SIDESTEPPING R/L  []   STAIRS up/Down Reciprocally w/ HR prn -2 flights  [x]   JARED HIP ABD 55# 2x10 reps  [x]   JARED HIP ADD  90# 2x10  [x]   JARED LEG PRESS 160#  2x15 ea  [x]   FWD / RUNNER STEP UP  Hole 4 from bottom x 15 R/L  [x]   LATERAL STEP UP w/ ABDuction Hole 3 from bottom x 15 R/L  []   SIDE STEP SQUATS 30 ft R/L  []   SLS (L) 2x 1 min with opp LE movement / challenges  []   BALANCE BOARD Frnt/Bk;  Side / Side x 2 min ea  [x]   STANDING DF w/ ECC Lowering standing w/ back at wall x 15  []     []   SL HIP ABD (R)  [x]   NUSTEP SEAT 6  ARMS 10  L7 x 10 min  []   UPDATED HEP and REVIEWED w/ PATIENT     []   NMR: Verbal and tactile cues to facilitate core, posture,  quad, glute, hip / LE  musculature statically and dynamically. 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    Progress strengthening /stabilization /functional activity       _________________________________________________    Manual Therapy:            08     mins  12364;  Therapeutic Exercise:    15    mins  57897;     Neuromuscular Michelle:        mins  34946;    Therapeutic Activity:     23      mins  02661;     Ultrasound:                          mins  30191;  Iontophoresis:                     mins  98053;    Electrical Stimulation:         mins  72362 ( );  Mechanical Traction:          mins  68984  Dry Needling                       mins self-pay     Timed Treatment:   46    mins                  Total Treatment:     46    mins        Anibal Haywood PT  Physical Therapist  Lic # [id number]

## 2025-07-07 ENCOUNTER — OFFICE VISIT (OUTPATIENT)
Dept: PHYSICAL THERAPY | Facility: CLINIC | Age: 70
End: 2025-07-07
Payer: COMMERCIAL

## 2025-07-07 DIAGNOSIS — Z74.09 IMPAIRED FUNCTIONAL MOBILITY AND ACTIVITY TOLERANCE: ICD-10-CM

## 2025-07-07 DIAGNOSIS — M17.12 PRIMARY OSTEOARTHRITIS OF LEFT KNEE: Primary | ICD-10-CM

## 2025-07-07 DIAGNOSIS — M25.552 BILATERAL HIP PAIN: ICD-10-CM

## 2025-07-07 DIAGNOSIS — M25.551 BILATERAL HIP PAIN: ICD-10-CM

## 2025-07-07 NOTE — PROGRESS NOTES
Physical Therapy Daily Treatment Note    [facility]  [address] [place] [address]  [phone] (phone)  [phone] (fax)    Patient: Becca Alba   : 1955  Diagnosis/ICD-10 Code:  Primary osteoarthritis of left knee [M17.12]  Referring practitioner: SUMEET Campos  Today's Date: 2025  Patient seen for 17 sessions    Visit Diagnoses:    ICD-10-CM ICD-9-CM   1. Primary osteoarthritis of left knee  M17.12 715.16   2. Impaired functional mobility and activity tolerance  Z74.09 V49.89   3. Bilateral hip pain  M25.551 719.45    M25.552               Subjective: Becca reports that she has been having some issues in her feet for over twenty years.      Objective     Forefoot flexibility:  45 degrees of passive supination    Treatment    Orthotic fit/train:  impressions taken for custom made orthotics    Therapeutic exercise  Seated towel scrunches x 20, B  Seated arch raises x 20, B    NMR: verbal cues for exercise technique were given.     Self care: I gave her towel scrunches and arch raises to begin her HEP. I also suggested that she acquire original yoga toes for forefoot flexibility.     Assessment & Plan       Assessment  Assessment details: Becca has a flexible forefoot, B, that should adapt well to a custom made orthotic on each foot.      Plan  Plan details: Continue per treatment plan.                Timed:    Manual Therapy:         mins  35285;  Therapeutic Exercise:    10     mins  29691;     Neuromuscular Michelle:    4    mins  21086;    Therapeutic Activity:          mins  43583;     Gait Training:             mins  30653;     Ultrasound:           mins  12424;    Self Care                       1        mins   56474  Orthotic Fitting (initial)  _25____ mins 69590  Orthotic Fitting (follow-up) _____ mins 80549      Untimed:    Electrical Stimulation:           mins  55445 ( );  Traction:           mins  50004;   Dry  Needling  (1-2 muscles)                  mins 20560 (Self-pay)  Dry Needling (3-4 muscles)  ____  20561 (Self-pay)  Dry Needling Trial             DRYNDLTRIAL  (No Charge)  Canalith Repositioning _____ mins 75336    Timed Treatment:   40   mins   Total Treatment:     40   mins    Fran Urbina PT  Physical Therapist    [place] [id number]

## 2025-07-09 ENCOUNTER — TREATMENT (OUTPATIENT)
Dept: PHYSICAL THERAPY | Facility: CLINIC | Age: 70
End: 2025-07-09
Payer: COMMERCIAL

## 2025-07-09 DIAGNOSIS — M17.12 PRIMARY OSTEOARTHRITIS OF LEFT KNEE: Primary | ICD-10-CM

## 2025-07-09 DIAGNOSIS — M25.559 HIP PAIN, UNSPECIFIED LATERALITY: ICD-10-CM

## 2025-07-09 DIAGNOSIS — Z74.09 IMPAIRED FUNCTIONAL MOBILITY AND ACTIVITY TOLERANCE: ICD-10-CM

## 2025-07-09 NOTE — PROGRESS NOTES
Physical Therapy Daily Note    Patient Name: Becca Alba         :  1955  Referring Physician: Kymberly Flores APRN  Treatment Date: 2025  Date of Initial Visit: Type: THERAPY  Noted: 3/5/2025    Visit Diagnoses:    ICD-10-CM ICD-9-CM   1. Primary osteoarthritis of left knee  M17.12 715.16   2. Impaired functional mobility and activity tolerance  Z74.09 V49.89   3. Hip pain, unspecified laterality  M25.559 719.45     Patient seen for 18 sessions  _____________________________________________________    Subjective   Becca Alba reports: pain lower anterior ankle - not as bad and more localized - knee bothering her when she is going up stairs with carrying weight     Objective   Pt ambulating w/o AD - tender at Ant tib tendon and MT junction (R)- and medial knee joint (L)    MANUAL THERAPY:   []   DTM/TPR Ant tibialis mm belly  []     []        EXERCISES:   []  QL/ITB/TFL SL Sretch (R) 2 min ea   []  SUPINE HF Stretch off Side of bed x 2 min R/L  []   SUPINE QUAD STRETCH off side of bed x 2 min R/L  []   FIG 4 PIRIFORMIS STRETCH w/ foot on ground x 1 min  []   FIG 4 HIP ER STRETCH x 1 min ea  []   SIT to STAND / squat - butt touch x 15  []   SIDESTEPPING R/L  []   STAIRS up/Down Reciprocally w/ HR prn -2 flights  [x]   JARED HIP ABD 55# 2x10 reps  [x]   JARED HIP ADD  90# 2x10  [x]   JARED LEG PRESS 160#  2x15 ea  [x]   FWD / RUNNER STEP UP  Hole 4 from bottom x 15 R/L  [x]   LATERAL STEP UP w/ ABDuction Hole 3 from bottom x 15 R/L  []   SIDE STEP SQUATS 30 ft R/L  []   SLS (L) 2x 1 min with opp LE movement / challenges  []   BALANCE BOARD Frnt/Bk;  Side / Side x 2 min ea  [x]   GASTROC STRETCH x 1 min off a step  [x]   STANDING DF w/ ECC Lowering standing w/ back at wall x 15  [x]   STANDING HEEL RAISES off STEP  []   SL HIP ABD (R)  [x]   NUSTEP SEAT 6  ARMS 10  L7 x 10 min  []   UPDATED HEP and REVIEWED w/ PATIENT     []   NMR: Verbal and tactile cues to facilitate core, posture,   quad, glute, hip / LE  musculature statically and dynamically. - Balance / proprioception activities -  -      Functional / Therapeutic Activities:    TAPING / BRACING: K-tape to      []   1) Unload PF jt      [x]   2) Unload Medial  jt line x 2 strip  (L) -w/ X-strip medially and lateral knee joint     [x] 3) Unload ANT TIB MM (R) LE w/ UNLOAD x 2 Distal tendon   SEE EXERCISEs -         Assessment/Plan  68 yo female: (L) knee pain / OA; May need to rule-out possible medial meniscus involvement -   Hip pain / hip OA R>L - Improved  ONSET OF ANT TIB STRAIN due to prolonged ECC control driving long distance recently   Pt may benefit from custom orthotics - to reduce stress to medial knees, etc.    Progress strengthening /stabilization /functional activity       _________________________________________________    Manual Therapy:                 mins  06815;  Therapeutic Exercise:    15    mins  64651;     Neuromuscular Michelle:   08     mins  72131;    Therapeutic Activity:     23      mins  24733;     Ultrasound:                          mins  45934;  Iontophoresis:                     mins  69803;    Electrical Stimulation:         mins  42977 ( );  Mechanical Traction:          mins  93694  Dry Needling                       mins self-pay     Timed Treatment:   46    mins                  Total Treatment:     46    mins        Anibal Haywood PT  Physical Therapist  Lic # [id number]

## 2025-07-15 ENCOUNTER — TREATMENT (OUTPATIENT)
Dept: PHYSICAL THERAPY | Facility: CLINIC | Age: 70
End: 2025-07-15
Payer: COMMERCIAL

## 2025-07-15 DIAGNOSIS — M25.559 HIP PAIN, UNSPECIFIED LATERALITY: ICD-10-CM

## 2025-07-15 DIAGNOSIS — M25.551 BILATERAL HIP PAIN: ICD-10-CM

## 2025-07-15 DIAGNOSIS — M17.12 PRIMARY OSTEOARTHRITIS OF LEFT KNEE: Primary | ICD-10-CM

## 2025-07-15 DIAGNOSIS — M25.552 BILATERAL HIP PAIN: ICD-10-CM

## 2025-07-15 DIAGNOSIS — Z74.09 IMPAIRED FUNCTIONAL MOBILITY AND ACTIVITY TOLERANCE: ICD-10-CM

## 2025-07-15 PROCEDURE — 97530 THERAPEUTIC ACTIVITIES: CPT | Performed by: PHYSICAL THERAPIST

## 2025-07-15 PROCEDURE — 97140 MANUAL THERAPY 1/> REGIONS: CPT | Performed by: PHYSICAL THERAPIST

## 2025-07-15 PROCEDURE — 97110 THERAPEUTIC EXERCISES: CPT | Performed by: PHYSICAL THERAPIST

## 2025-07-15 NOTE — PROGRESS NOTES
Physical Therapy Daily Note    Patient Name: Becca Alba         :  1955  Referring Physician: Kymberly Flores APRN  Treatment Date: 7/15/2025  Date of Initial Visit: Type: THERAPY  Noted: 3/5/2025    Visit Diagnoses:    ICD-10-CM ICD-9-CM   1. Primary osteoarthritis of left knee  M17.12 715.16   2. Impaired functional mobility and activity tolerance  Z74.09 V49.89   3. Hip pain, unspecified laterality  M25.559 719.45   4. Bilateral hip pain  M25.551 719.45    M25.552      Patient seen for 19 sessions  _____________________________________________________    Subjective   Becca Alba reports: ache all over - (R) hip hurting more this past week - keeping her up at night -   Much less ant tib pain - now only sore most distal region at ant ankle region -   Knee needs to be taped -   Improved tolerance to BAR class 2-3 x/week - modifies certain activities prn  All over ache may be due to her cancer drug which increases arthritic changes / pain -     Objective   Tender post/lat hip w/ TPs (lat to sacrum into Glute med)  Pain w/ hip IR, FADIR , ERNESTO and Scour -     TREATMENT  MANUAL THERAPY:   [x]   DTM/TPR GLUTE MEDIUS MM region (R)   []     []        EXERCISES:   [x]  QL/ITB/TFL SL Sretch (R) 2 min ea   [x]  SUPINE HF Stretch off Side of bed x 2 min R/L  []   SUPINE QUAD STRETCH off side of bed x 2 min R/L  [x]   FIG 4 PIRIFORMIS STRETCH w/ foot on ground x 1 min  [x]   FIG 4 GLUTE STRETCH w/ FOOT OFF GROUND x 1 min  [x]   FIG 4 HIP ER STRETCH x 1 min ea  []   SIT to STAND / squat - butt touch x 15  []   SIDESTEPPING R/L  []   MONSTER WALK Fwd/Retro  []   STAIRS up/Down Reciprocally w/ HR prn -2 flights  []   JARED HIP ABD 55# 2x10 reps  []   JARED HIP ADD  90# 2x10  []   JARED LEG PRESS 160#  2x15 ea  []   FWD / RUNNER STEP UP  Hole 4 from bottom x 15 R/L  []   LATERAL STEP UP w/ ABDuction Hole 3 from bottom x 15 R/L  []   SIDE STEP SQUATS 30 ft R/L  []   SLS (L) 2x 1 min with opp LE movement  / challenges  []   BALANCE BOARD Frnt/Bk;  Side / Side x 2 min ea  [x]   GASTROC STRETCH x 1 min off a step  [x]   STANDING DF w/ ECC Lowering standing w/ back at wall x 15  [x]   STANDING HEEL RAISES off STEP  []   SL HIP ABD (R)  []   NUSTEP SEAT 6  ARMS 10  L7 x 10 min  []   UPDATED HEP and REVIEWED w/ PATIENT     []   NMR: Verbal and tactile cues to facilitate core, posture,  quad, glute, hip / LE  musculature statically and dynamically. 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 - much improved -   Pt may benefit from custom orthotics -     Hip / glute medius/Myofascial pain (R) -     Progress strengthening /stabilization /functional activity       _________________________________________________    Manual Therapy:            10     mins  55004;  Therapeutic Exercise:    10    mins  19589;     Neuromuscular Michelle:        mins  91595;    Therapeutic Activity:     23      mins  12885;     Ultrasound:                          mins  91097;  Iontophoresis:                     mins  70524;    Electrical Stimulation:         mins  92935 ( );  Mechanical Traction:          mins  30222  Dry Needling                       mins self-pay     Timed Treatment:   43    mins                  Total Treatment:     43    mins        Anibal Haywood, PT  Physical Therapist  Lic # [id number]

## 2025-07-23 ENCOUNTER — TREATMENT (OUTPATIENT)
Dept: PHYSICAL THERAPY | Facility: CLINIC | Age: 70
End: 2025-07-23
Payer: COMMERCIAL

## 2025-07-23 DIAGNOSIS — M25.551 BILATERAL HIP PAIN: ICD-10-CM

## 2025-07-23 DIAGNOSIS — M25.552 BILATERAL HIP PAIN: ICD-10-CM

## 2025-07-23 DIAGNOSIS — Z74.09 IMPAIRED FUNCTIONAL MOBILITY AND ACTIVITY TOLERANCE: ICD-10-CM

## 2025-07-23 DIAGNOSIS — M17.12 PRIMARY OSTEOARTHRITIS OF LEFT KNEE: Primary | ICD-10-CM

## 2025-07-23 DIAGNOSIS — M25.559 HIP PAIN, UNSPECIFIED LATERALITY: ICD-10-CM

## 2025-07-23 NOTE — PROGRESS NOTES
Physical Therapy Daily Note    Patient Name: Becca Alba         :  1955  Referring Physician: Kymberly Flores APRN  Treatment Date: 2025  Date of Initial Visit: Type: THERAPY  Noted: 3/5/2025    Visit Diagnoses:    ICD-10-CM ICD-9-CM   1. Primary osteoarthritis of left knee  M17.12 715.16   2. Impaired functional mobility and activity tolerance  Z74.09 V49.89   3. Hip pain, unspecified laterality  M25.559 719.45   4. 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 of moving, cleaning and climbing on desks, etc and notes increased pain at 1st MPJ (R) foot along medial foot / arch and mm soreness in hips - less knee pain overall -     Objective   Tender at 1st MPJ w/ hallux valgus (R); Tender along posterior tibialis tendon along medial foot and navicular region (R) -     TREATMENT  MANUAL THERAPY:   []   DTM/TPR GLUTE MEDIUS MM region (R)   []     []        EXERCISES:   []  QL/ITB/TFL SL Sretch (R) 2 min ea   []  SUPINE HF Stretch off Side of bed x 2 min R/L  []   SUPINE QUAD STRETCH off side of bed x 2 min R/L  []   FIG 4 PIRIFORMIS STRETCH w/ foot on ground x 1 min  []   FIG 4 GLUTE STRETCH w/ FOOT OFF GROUND x 1 min  []   FIG 4 HIP ER STRETCH x 1 min ea  []   SIT to STAND / squat - butt touch x 15  []   SIDESTEPPING R/L  []   MONSTER WALK Fwd/Retro  []   STAIRS up/Down Reciprocally w/ HR prn -2 flights  [x]   JARED HIP ABD 55# 2x10 reps  []   JARED HIP ADD  90# 2x10  []   JARED LEG PRESS 160#  2x15 ea  []   FWD / RUNNER STEP UP  Hole 4 from bottom x 15 R/L  []   LATERAL STEP UP w/ ABDuction Hole 3 from bottom x 15 R/L  []   SIDE STEP SQUATS 30 ft R/L  []   SLS (L) 2x 1 min with opp LE movement / challenges  []   BALANCE BOARD Frnt/Bk;  Side / Side x 2 min ea  []   GASTROC STRETCH x 1 min off a step  []    STANDING DF w/ ECC Lowering standing w/ back at wall x 15  []   STANDING HEEL RAISES off STEP  []   SL HIP ABD (R)  [x]   NUSTEP SEAT 6  ARMS 10  L7 x 10 min  []   UPDATED HEP and REVIEWED w/ PATIENT     []   NMR: Verbal and tactile cues to facilitate core, posture,  quad, glute, hip / LE  musculature statically and dynamically. - Balance / proprioception activities -  -      Functional / Therapeutic Activities:    TAPING / BRACING: K-tape to      []   1) Unload PF jt      [x]   2) Unload Medial  jt line x 2 strip  (L) -w/ X-strip medially and lateral knee joint     [] 3) Unload ANT TIB MM (R) LE w/ UNLOAD x 2 Distal tendon     [x] 4) K-Tape to reduce hallux valgus stress to 1st MPJ (R)    [x]   5) K-tape w/ leuko over x 2 strips to unload medial arch and posterior tibialis tendon (R)  SEE EXERCISEs -   foot ASSESSMENT       Assessment/Plan  70 yo female: (L) knee pain / OA; May need to rule-out possible medial meniscus involvement -   Hip pain / hip OA R>L - Improved  ANT TIB STRAIN due to prolonged ECC control driving long distance recently - much improved -   Pt may benefit from custom orthotics -      Hip / glute medius/Myofascial pain (R) -   (R) medial foot / arch / strain -     Progress strengthening /stabilization /functional activity - awaiting for orthotics to come in to be fitted for them -        _________________________________________________    Manual Therapy:                 mins  88115;  Therapeutic Exercise:    15    mins  01298;     Neuromuscular Michelle:        mins  44231;    Therapeutic Activity:     25      mins  82622;     Ultrasound:                          mins  64581;  Iontophoresis:                     mins  76872;    Electrical Stimulation:         mins  17472 ( );  Mechanical Traction:          mins  26408  Dry Needling                       mins self-pay     Timed Treatment:   40    mins                  Total Treatment:     40    mins        Anibal Haywood PT  Physical  Therapist  Lic # [id number]

## 2025-07-28 ENCOUNTER — TREATMENT (OUTPATIENT)
Dept: PHYSICAL THERAPY | Facility: CLINIC | Age: 70
End: 2025-07-28
Payer: COMMERCIAL

## 2025-07-28 DIAGNOSIS — Z74.09 IMPAIRED FUNCTIONAL MOBILITY AND ACTIVITY TOLERANCE: ICD-10-CM

## 2025-07-28 DIAGNOSIS — M25.552 BILATERAL HIP PAIN: ICD-10-CM

## 2025-07-28 DIAGNOSIS — M17.12 PRIMARY OSTEOARTHRITIS OF LEFT KNEE: Primary | ICD-10-CM

## 2025-07-28 DIAGNOSIS — M25.551 BILATERAL HIP PAIN: ICD-10-CM

## 2025-07-28 DIAGNOSIS — M25.559 HIP PAIN, UNSPECIFIED LATERALITY: ICD-10-CM

## 2025-07-28 PROCEDURE — 97530 THERAPEUTIC ACTIVITIES: CPT | Performed by: PHYSICAL THERAPIST

## 2025-07-28 PROCEDURE — 97112 NEUROMUSCULAR REEDUCATION: CPT | Performed by: PHYSICAL THERAPIST

## 2025-07-28 PROCEDURE — 97110 THERAPEUTIC EXERCISES: CPT | Performed by: PHYSICAL THERAPIST

## 2025-07-28 NOTE — PROGRESS NOTES
Physical Therapy Daily Note    Patient Name: Becca Alba         :  1955  Referring Physician: Kymberly Flores APRN  Treatment Date: 2025  Date of Initial Visit: No linked episodes    Visit Diagnoses:    ICD-10-CM ICD-9-CM   1. Primary osteoarthritis of left knee  M17.12 715.16   2. Impaired functional mobility and activity tolerance  Z74.09 V49.89   3. Hip pain, unspecified laterality  M25.559 719.45   4. Bilateral hip pain  M25.551 719.45    M25.552      Patient seen for Visit count could not be calculated. Make sure you are using a visit which is associated with an episode. sessions  _____________________________________________________    Subjective   Becca Alba reports: (L) knee improved, but harder w/ steps if carrying items of wt - intermittent pain posterior / lateral /post-medial knee and ant/med knee -   Pain in lower HS med/lat when she bent over  Meds she is taking for breast cancer can cause increase joint pain -   Foot doing better - tape very helpful -   Most limited w/ stairs w/ carrying heavy items - feels uneasy on LLE (fear of either giving way or weak, or pain ) -       Objective   Tender distal med/lat HS tendons - and medial joint line (L) knee -     TREATMENT:   MANUAL THERAPY:   []   DTM/TPR GLUTE MEDIUS MM region (R)   []     []        EXERCISES:   []  QL/ITB/TFL SL Sretch (R) 2 min ea   []  SUPINE HF Stretch off Side of bed x 2 min R/L  []   SUPINE QUAD STRETCH off side of bed x 2 min R/L  []   FIG 4 PIRIFORMIS STRETCH w/ foot on ground x 1 min  []   FIG 4 GLUTE STRETCH w/ FOOT OFF GROUND x 1 min  []   FIG 4 HIP ER STRETCH x 1 min ea  []   SIT to STAND / squat - butt touch x 15  []   SIDESTEPPING R/L  []   MONSTER WALK Fwd/Retro  []   STAIRS up/Down Reciprocally w/ HR prn -2 flights  [x]   JARED HIP ABD 55# 2x10 reps  [x]   JARED HIP ADD  90# 2x10  [x]   JARED LEG PRESS 160#  2x15 ea  [x]   FWD / RUNNER STEP UP  Hole 4 from bottom x 15 R/L  [x]   LATERAL  STEP UP w/ ABDuction Hole 3 from bottom x 15 R/L  []   SIDE STEP SQUATS 30 ft R/L  [x]   SLS (L) 2x 1 min with opp LE movement / challenges  [x]   BALANCE BOARD Frnt/Bk;  Side / Side x 2 min ea  []   GASTROC STRETCH x 1 min off a step  []   STANDING DF w/ ECC Lowering standing w/ back at wall x 15  []   STANDING HEEL RAISES off STEP  []   SL HIP ABD (R)  [x]   NUSTEP SEAT 6  ARMS 10  L7 x 10 min  []   UPDATED HEP and REVIEWED w/ PATIENT     [x]   NMR: Verbal and tactile cues to facilitate core, posture,  quad, glute, hip / LE  musculature statically and dynamically. - Balance / proprioception activities -  -      Functional / Therapeutic Activities:    TAPING / BRACING: K-tape to      []   1) Unload PF jt      [x]   2) Unload Medial  jt line x 2 strip  (L) -w/ X-strip medially and lateral knee joint     [] 3) Unload ANT TIB MM (R) LE w/ UNLOAD x 2 Distal tendon     [] 4) K-Tape to reduce hallux valgus stress to 1st MPJ (R)    []   5) K-tape w/ leuko over x 2 strips to unload medial arch and posterior tibialis tendon (R)  SEE EXERCISEs -   foot ASSESSMENT    Assessment/Plan  70 yo female: (L) knee pain / OA; May need to rule-out possible medial meniscus involvement -   Hip pain / hip OA R>L - Improved  ANT TIB STRAIN due to prolonged ECC control driving long distance recently - much improved -   Pt may benefit from custom orthotics -      Hip / glute medius/Myofascial pain (R) -   (R) medial foot / arch / strain -         Progress strengthening /stabilization /functional activity       _________________________________________________    Manual Therapy:                 mins  98843;  Therapeutic Exercise:    15    mins  14760;     Neuromuscular Michelle:   08     mins  17811;    Therapeutic Activity:     23      mins  12794;     Ultrasound:                          mins  72349;  Iontophoresis:                     mins  42090;    Electrical Stimulation:         mins  79824 ( );  Mechanical Traction:          mins   05941  Dry Needling                       mins self-pay     Timed Treatment:   46    mins                  Total Treatment:     46    mins        Anibal Haywood PT  Physical Therapist  Lic # [id number]

## 2025-08-13 ENCOUNTER — TREATMENT (OUTPATIENT)
Dept: PHYSICAL THERAPY | Facility: CLINIC | Age: 70
End: 2025-08-13
Payer: COMMERCIAL

## 2025-08-13 DIAGNOSIS — M17.12 PRIMARY OSTEOARTHRITIS OF LEFT KNEE: Primary | ICD-10-CM

## 2025-08-13 DIAGNOSIS — Z74.09 IMPAIRED FUNCTIONAL MOBILITY AND ACTIVITY TOLERANCE: ICD-10-CM

## 2025-08-15 ENCOUNTER — OFFICE VISIT (OUTPATIENT)
Dept: PHYSICAL THERAPY | Facility: CLINIC | Age: 70
End: 2025-08-15
Payer: COMMERCIAL

## 2025-08-15 DIAGNOSIS — M25.551 BILATERAL HIP PAIN: ICD-10-CM

## 2025-08-15 DIAGNOSIS — Z74.09 IMPAIRED FUNCTIONAL MOBILITY AND ACTIVITY TOLERANCE: ICD-10-CM

## 2025-08-15 DIAGNOSIS — M25.552 BILATERAL HIP PAIN: ICD-10-CM

## 2025-08-15 DIAGNOSIS — M25.559 HIP PAIN, UNSPECIFIED LATERALITY: ICD-10-CM

## 2025-08-15 DIAGNOSIS — M17.12 PRIMARY OSTEOARTHRITIS OF LEFT KNEE: Primary | ICD-10-CM

## 2025-08-19 ENCOUNTER — OFFICE VISIT (OUTPATIENT)
Dept: INTERNAL MEDICINE | Facility: CLINIC | Age: 70
End: 2025-08-19
Payer: COMMERCIAL

## 2025-08-19 ENCOUNTER — PATIENT ROUNDING (BHMG ONLY) (OUTPATIENT)
Age: 70
End: 2025-08-19
Payer: COMMERCIAL

## 2025-08-19 VITALS
WEIGHT: 138 LBS | BODY MASS INDEX: 24.45 KG/M2 | OXYGEN SATURATION: 98 % | SYSTOLIC BLOOD PRESSURE: 130 MMHG | DIASTOLIC BLOOD PRESSURE: 72 MMHG | HEART RATE: 60 BPM | HEIGHT: 63 IN

## 2025-08-19 DIAGNOSIS — B02.9 HERPES ZOSTER WITHOUT COMPLICATION: Primary | ICD-10-CM

## 2025-08-19 PROCEDURE — 99213 OFFICE O/P EST LOW 20 MIN: CPT | Performed by: INTERNAL MEDICINE

## 2025-08-19 RX ORDER — GABAPENTIN 100 MG/1
100 CAPSULE ORAL 3 TIMES DAILY
Qty: 90 CAPSULE | Refills: 0 | Status: SHIPPED | OUTPATIENT
Start: 2025-08-19

## 2025-08-26 DIAGNOSIS — B02.9 HERPES ZOSTER WITHOUT COMPLICATION: ICD-10-CM

## 2025-08-26 RX ORDER — GABAPENTIN 100 MG/1
300 CAPSULE ORAL 3 TIMES DAILY
COMMUNITY
Start: 2025-08-26